# Patient Record
Sex: FEMALE | Race: WHITE | NOT HISPANIC OR LATINO | Employment: UNEMPLOYED | URBAN - METROPOLITAN AREA
[De-identification: names, ages, dates, MRNs, and addresses within clinical notes are randomized per-mention and may not be internally consistent; named-entity substitution may affect disease eponyms.]

---

## 2018-03-28 ENCOUNTER — OFFICE VISIT (OUTPATIENT)
Dept: NEUROSURGERY | Facility: CLINIC | Age: 58
End: 2018-03-28

## 2018-03-28 VITALS
SYSTOLIC BLOOD PRESSURE: 142 MMHG | BODY MASS INDEX: 41.62 KG/M2 | WEIGHT: 259 LBS | HEIGHT: 66 IN | HEART RATE: 78 BPM | DIASTOLIC BLOOD PRESSURE: 73 MMHG

## 2018-03-28 DIAGNOSIS — M51.36 DDD (DEGENERATIVE DISC DISEASE), LUMBAR: ICD-10-CM

## 2018-03-28 DIAGNOSIS — M51.16 HERNIATION OF LUMBAR INTERVERTEBRAL DISC WITH RADICULOPATHY: Primary | ICD-10-CM

## 2018-03-28 PROBLEM — M51.369 DDD (DEGENERATIVE DISC DISEASE), LUMBAR: Status: ACTIVE | Noted: 2018-03-28

## 2018-03-28 PROCEDURE — 99213 OFFICE O/P EST LOW 20 MIN: CPT | Performed by: NEUROLOGICAL SURGERY

## 2018-03-28 NOTE — PROGRESS NOTES
Subjective   Patient ID: Damaris Williamson is a 57 y.o. female is being seen for consultation today at the request of Parvin Gustafson for back pain.    Today the patient reports back pain that has worsened over the past two years. She states that the back pain radiates into the right leg down past the knee along with numbness in the right thigh. She also reports that she has had a hard time controlling her bladder and bowels for the past 6 months.    Back Pain   This is a chronic problem. The current episode started more than 1 year ago. The problem occurs daily. The problem has been gradually worsening since onset. The pain is present in the lumbar spine. The pain radiates to the right thigh and right knee. Associated symptoms include bladder incontinence, bowel incontinence, leg pain, numbness, tingling and weakness.       The following portions of the patient's history were reviewed and updated as appropriate: allergies, current medications, past family history, past medical history, past social history, past surgical history and problem list.    Review of Systems   Gastrointestinal: Positive for bowel incontinence.   Genitourinary: Positive for bladder incontinence.   Musculoskeletal: Positive for back pain.   Neurological: Positive for tingling, weakness and numbness.   All other systems reviewed and are negative.    I have not seen this patient for over 2 years.  Since I last saw, she lost her insurance and did not come back in for follow up.  She recently fell and injured her right knee.  She has seen Orthopedics and may need a knee replacement at some point.  She is wearing a brace and is hobbling around and cannot bear weight largely because of the knee itself, but she is also having increase in her back pain with radiating pain down the right buttock and into the calf.  This is a new symptom or at least a worsening of an ongoing symptom.  I think we need to reimage her and formulate a plan based on  that.  We will go ahead and get a new MRI and have her come back.              Objective   Physical Exam   Constitutional: She is oriented to person, place, and time. She appears well-developed and well-nourished.   HENT:   Head: Normocephalic and atraumatic.   Eyes: Conjunctivae and EOM are normal. Pupils are equal, round, and reactive to light.   Fundoscopic exam:       The right eye shows no papilledema. The right eye shows venous pulsations.        The left eye shows no papilledema. The left eye shows venous pulsations.   Neck: Carotid bruit is not present.   Neurological: She is oriented to person, place, and time. She has a normal Finger-Nose-Finger Test and a normal Heel to Shin Test. Gait normal.   Reflex Scores:       Tricep reflexes are 2+ on the right side and 2+ on the left side.       Bicep reflexes are 2+ on the right side and 2+ on the left side.       Brachioradialis reflexes are 2+ on the right side and 2+ on the left side.       Patellar reflexes are 2+ on the right side and 2+ on the left side.       Achilles reflexes are 2+ on the right side and 2+ on the left side.  Psychiatric: Her speech is normal.     Neurologic Exam     Mental Status   Oriented to person, place, and time.   Registration of memory: Good recent and remote memory.   Attention: normal. Concentration: normal.   Speech: speech is normal   Level of consciousness: alert  Knowledge: consistent with education.     Cranial Nerves     CN II   Visual fields full to confrontation.   Visual acuity: normal    CN III, IV, VI   Pupils are equal, round, and reactive to light.  Extraocular motions are normal.     CN V   Facial sensation intact.   Right corneal reflex: normal  Left corneal reflex: normal    CN VII   Facial expression full, symmetric.   Right facial weakness: none  Left facial weakness: none    CN VIII   Hearing: intact    CN IX, X   Palate: symmetric    CN XI   Right sternocleidomastoid strength: normal  Left sternocleidomastoid  strength: normal    CN XII   Tongue: not atrophic  Tongue deviation: none    Motor Exam   Muscle bulk: normal  Right arm tone: normal  Left arm tone: normal  Right leg tone: normal  Left leg tone: normal    Strength   Strength 5/5 except as noted.     Sensory Exam   Light touch normal.     Gait, Coordination, and Reflexes     Gait  Gait: normal    Coordination   Finger to nose coordination: normal  Heel to shin coordination: normal    Reflexes   Right brachioradialis: 2+  Left brachioradialis: 2+  Right biceps: 2+  Left biceps: 2+  Right triceps: 2+  Left triceps: 2+  Right patellar: 2+  Left patellar: 2+  Right achilles: 2+  Left achilles: 2+  Right : 2+  Left : 2+      Assessment/Plan   Independent Review of Radiographic Studies:    I reviewed the MRI done on 9/26/14 which did show some disc protrusion at L3-L4, L4-L5, and L5-S1.  It seemed to be more towards the left at L4-L5.  Agree with the report.      Medical Decision Making:    Before we do anything, I think we need to get a new MRI.  We will obtain that and have her come back for follow-up possibly considering physical therapy versus blocks.      Damaris was seen today for back pain.    Diagnoses and all orders for this visit:    Herniation of lumbar intervertebral disc with radiculopathy  -     MRI Lumbar Spine Without Contrast; Future    DDD (degenerative disc disease), lumbar  -     MRI Lumbar Spine Without Contrast; Future      Return in about 2 weeks (around 4/11/2018) for After MRI.

## 2018-04-04 ENCOUNTER — OFFICE VISIT (OUTPATIENT)
Dept: NEUROSURGERY | Facility: CLINIC | Age: 58
End: 2018-04-04

## 2018-04-04 VITALS
HEIGHT: 66 IN | BODY MASS INDEX: 43.39 KG/M2 | WEIGHT: 270 LBS | HEART RATE: 70 BPM | SYSTOLIC BLOOD PRESSURE: 102 MMHG | DIASTOLIC BLOOD PRESSURE: 64 MMHG

## 2018-04-04 DIAGNOSIS — M51.36 DDD (DEGENERATIVE DISC DISEASE), LUMBAR: Primary | ICD-10-CM

## 2018-04-04 DIAGNOSIS — M54.41 CHRONIC MIDLINE LOW BACK PAIN WITH RIGHT-SIDED SCIATICA: ICD-10-CM

## 2018-04-04 DIAGNOSIS — G89.29 CHRONIC MIDLINE LOW BACK PAIN WITH RIGHT-SIDED SCIATICA: ICD-10-CM

## 2018-04-04 PROCEDURE — 99213 OFFICE O/P EST LOW 20 MIN: CPT | Performed by: NEUROLOGICAL SURGERY

## 2018-04-04 NOTE — PROGRESS NOTES
"Subjective   Patient ID: Damaris Williamson is a 57 y.o. female is here today for follow-up after lumbar MRI.  She states her symptoms are unchanged since last visit. She has noticed her right leg \"dragging\" more since last visit.  She takes Naproxen 250 mg prn , Ibuprofen 200 mg prn, Tramadol 50 mg prn and Cyclobenzaprine 5 mg prn for pain.     Back Pain   This is a chronic problem. The current episode started more than 1 year ago. The problem occurs daily. The problem has been gradually worsening since onset. The pain is present in the lumbar spine. The pain radiates to the right thigh and right knee. Associated symptoms include bladder incontinence, bowel incontinence, leg pain, numbness, tingling and weakness.   Leg Pain    Associated symptoms include numbness and tingling.       The following portions of the patient's history were reviewed and updated as appropriate: allergies, current medications, past family history, past medical history, past social history, past surgical history and problem list.    Review of Systems   Gastrointestinal: Positive for bowel incontinence.   Genitourinary: Positive for bladder incontinence.   Musculoskeletal: Positive for back pain.   Neurological: Positive for tingling, weakness and numbness.   All other systems reviewed and are negative.    I had seen this patient intermittently over the last few years. We got a new MRI to investigate the sources of her low back, right buttock, and leg pain.  She has severe bilateral knee problems and is probably heading towards a right total knee replacement. She is working with her orthopedic surgeon with that.  The MRI was reviewed.  There is no evidence of any significant mechanical root compression.  Obviously, nothing from a surgical standpoint to offer. She had worked with Dr. Goodwin in the past and I think continuing to work with the pain management physician is the best option.  She is willing to go see a pain doctor within the [facility] " system at [facility] and we will arrange that.  She will be seen on an as needed basis.         Objective   Physical Exam   Constitutional: She is oriented to person, place, and time. She appears well-developed and well-nourished.   HENT:   Head: Normocephalic and atraumatic.   Eyes: Conjunctivae and EOM are normal. Pupils are equal, round, and reactive to light.   Fundoscopic exam:       The right eye shows no papilledema. The right eye shows venous pulsations.        The left eye shows no papilledema. The left eye shows venous pulsations.   Neck: Carotid bruit is not present.   Neurological: She is oriented to person, place, and time. She has a normal Finger-Nose-Finger Test and a normal Heel to Shin Test. Gait normal.   Reflex Scores:       Tricep reflexes are 2+ on the right side and 2+ on the left side.       Bicep reflexes are 2+ on the right side and 2+ on the left side.       Brachioradialis reflexes are 2+ on the right side and 2+ on the left side.       Patellar reflexes are 2+ on the right side and 2+ on the left side.       Achilles reflexes are 2+ on the right side and 2+ on the left side.  Psychiatric: Her speech is normal.     Neurologic Exam     Mental Status   Oriented to person, place, and time.   Registration of memory: Good recent and remote memory.   Attention: normal. Concentration: normal.   Speech: speech is normal   Level of consciousness: alert  Knowledge: consistent with education.     Cranial Nerves     CN II   Visual fields full to confrontation.   Visual acuity: normal    CN III, IV, VI   Pupils are equal, round, and reactive to light.  Extraocular motions are normal.     CN V   Facial sensation intact.   Right corneal reflex: normal  Left corneal reflex: normal    CN VII   Facial expression full, symmetric.   Right facial weakness: none  Left facial weakness: none    CN VIII   Hearing: intact    CN IX, X   Palate: symmetric    CN XI   Right sternocleidomastoid strength: normal  Left  sternocleidomastoid strength: normal    CN XII   Tongue: not atrophic  Tongue deviation: none    Motor Exam   Muscle bulk: normal  Right arm tone: normal  Left arm tone: normal  Right leg tone: normal  Left leg tone: normal    Strength   Strength 5/5 except as noted.     Sensory Exam   Light touch normal.     Gait, Coordination, and Reflexes     Gait  Gait: normal    Coordination   Finger to nose coordination: normal  Heel to shin coordination: normal    Reflexes   Right brachioradialis: 2+  Left brachioradialis: 2+  Right biceps: 2+  Left biceps: 2+  Right triceps: 2+  Left triceps: 2+  Right patellar: 2+  Left patellar: 2+  Right achilles: 2+  Left achilles: 2+  Right : 2+  Left : 2+      Assessment/Plan   Independent Review of Radiographic Studies:    I reviewed the MRI done 4/2/18 which showed some mild disc desiccation and mild disc bulging but certainly no root compression or herniated disc.  Agree with the report.    Medical Decision Making:    Certainly nothing from a surgical standpoint.  She is best off working with a pain physician and we will make the referral.  She will also continue working with her orthopedic surgeon.  We will see her on an as-needed basis.      Damaris was seen today for back pain and leg pain.    Diagnoses and all orders for this visit:    DDD (degenerative disc disease), lumbar  -     Ambulatory Referral to Pain Management    Chronic midline low back pain with right-sided sciatica  -     Ambulatory Referral to Pain Management      Return if symptoms worsen or fail to improve.

## 2018-04-17 ENCOUNTER — OFFICE VISIT (OUTPATIENT)
Dept: PAIN MEDICINE | Facility: CLINIC | Age: 58
End: 2018-04-17

## 2018-04-17 VITALS
OXYGEN SATURATION: 98 % | RESPIRATION RATE: 16 BRPM | HEIGHT: 66 IN | BODY MASS INDEX: 43.62 KG/M2 | WEIGHT: 271.4 LBS | SYSTOLIC BLOOD PRESSURE: 154 MMHG | HEART RATE: 85 BPM | DIASTOLIC BLOOD PRESSURE: 86 MMHG | TEMPERATURE: 98.5 F

## 2018-04-17 DIAGNOSIS — M54.41 CHRONIC MIDLINE LOW BACK PAIN WITH RIGHT-SIDED SCIATICA: Primary | ICD-10-CM

## 2018-04-17 DIAGNOSIS — G89.29 CHRONIC MIDLINE LOW BACK PAIN WITH RIGHT-SIDED SCIATICA: Primary | ICD-10-CM

## 2018-04-17 DIAGNOSIS — M43.06 LUMBAR SPONDYLOLYSIS: ICD-10-CM

## 2018-04-17 LAB
POC AMPHETAMINES: NEGATIVE
POC BARBITURATES: NEGATIVE
POC BENZODIAZEPHINES: NEGATIVE
POC COCAINE: NEGATIVE
POC METHADONE: NEGATIVE
POC METHAMPHETAMINE SCREEN URINE: NEGATIVE
POC OPIATES: NEGATIVE
POC OXYCODONE: NEGATIVE
POC PHENCYCLIDINE: NEGATIVE
POC PROPOXYPHENE: NEGATIVE
POC THC: NEGATIVE
POC TRICYCLIC ANTIDEPRESSANTS: NEGATIVE

## 2018-04-17 PROCEDURE — 80305 DRUG TEST PRSMV DIR OPT OBS: CPT | Performed by: PAIN MEDICINE

## 2018-04-17 PROCEDURE — 99204 OFFICE O/P NEW MOD 45 MIN: CPT | Performed by: PAIN MEDICINE

## 2018-04-17 RX ORDER — CYCLOBENZAPRINE HCL 5 MG
5 TABLET ORAL 2 TIMES DAILY PRN
Qty: 60 TABLET | Refills: 2 | Status: SHIPPED | OUTPATIENT
Start: 2018-04-17 | End: 2018-10-03 | Stop reason: SDUPTHER

## 2018-04-17 RX ORDER — TRAMADOL HYDROCHLORIDE 50 MG/1
50 TABLET ORAL DAILY PRN
Qty: 30 TABLET | Refills: 0 | Status: SHIPPED | OUTPATIENT
Start: 2018-04-17 | End: 2018-08-06 | Stop reason: SDUPTHER
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
## 2018-04-22 ENCOUNTER — RESULTS ENCOUNTER (OUTPATIENT)
Dept: PAIN MEDICINE | Facility: CLINIC | Age: 58
End: 2018-04-22

## 2018-04-22 DIAGNOSIS — M43.06 LUMBAR SPONDYLOLYSIS: ICD-10-CM

## 2018-04-22 DIAGNOSIS — M54.41 CHRONIC MIDLINE LOW BACK PAIN WITH RIGHT-SIDED SCIATICA: ICD-10-CM

## 2018-04-22 DIAGNOSIS — G89.29 CHRONIC MIDLINE LOW BACK PAIN WITH RIGHT-SIDED SCIATICA: ICD-10-CM

## 2018-05-02 ENCOUNTER — DOCUMENTATION (OUTPATIENT)
Dept: PAIN MEDICINE | Facility: CLINIC | Age: 58
End: 2018-05-02

## 2018-05-02 ENCOUNTER — OUTSIDE FACILITY SERVICE (OUTPATIENT)
Dept: PAIN MEDICINE | Facility: CLINIC | Age: 58
End: 2018-05-02

## 2018-05-02 PROCEDURE — 64493 INJ PARAVERT F JNT L/S 1 LEV: CPT | Performed by: PAIN MEDICINE

## 2018-05-02 PROCEDURE — 64495 INJ PARAVERT F JNT L/S 3 LEV: CPT | Performed by: PAIN MEDICINE

## 2018-05-02 PROCEDURE — 64494 INJ PARAVERT F JNT L/S 2 LEV: CPT | Performed by: PAIN MEDICINE

## 2018-05-02 NOTE — PROGRESS NOTES
Bilateral L2-5 Lumbar Medial Branch Blockade  [facility]    PREOPERATIVE DIAGNOSIS:  Lumbar spondylosis without myelopathy    POSTOPERATIVE DIAGNOSIS:  Lumbar spondylosis without myelopathy    PROCEDURE:   Diagnostic Bilateral Lumbar Medial Branch Nerve Blockades, with fluoroscopy:  L2, L3, L4, and L5 nerves (at the L3, L4, L5 transverse processes and the sacral alar groove) to block facet joints L3-4, L4-5, and L5-S1  1. 55746-55 -- Bilateral Lumbar Facet blocks, 1st Level  2. 17206-62 -- Bilateral Lumbar Facet blocks, 2nd  Level  3. 66997-32 -- Bilateral Lumbar Facet blocks, 3rd Level    PRE-PROCEDURE DISCUSSION WITH PATIENT:    Risks and complications were discussed with the patient prior to starting the procedure and informed consent was obtained.      SURGEON:  Dora Tellez MD    REASON FOR PROCEDURE:    Diagnostic injection at this level is needed    SEDATION:  Versed 2mg IV  ANESTHETIC:  Marcaine 0.25%  STEROID:  Methylprednisolone (DEPO MEDROL) 80mg/ml  TOTAL VOLUME OF SOLUTION: 8ml    DESCRIPTON OF PROCEDURE:  After obtaining informed consent, IV access was obtained in the preoperative area.   The patient was taken to the operating room.  The patient was placed in the prone position with a pillow under the abdomen. All pressure points were well padded.  EKG, blood pressure, and pulse oximeter were monitored.  The patient was monitored and sedated by the RN under my direction. The lumbosacral area was prepped with Chloraprep and draped in a sterile fashion. Under fluoroscopic guidance the transverse processes of the L3, L4, and L5 vertebrae at the junctions of the superior articular processes were identified on the right. Also identified was the groove between the ala and the superior articular process of the sacrum on the ipsilateral side.  Skin and subcutaneous tissue were anesthetized with 1% lidocaine above each of these points. A 22-gauge spinal needle was introduced under  fluoroscopic guidance at the above junctions. Aspiration was negative for blood and CSF.  After confirming the position of the needle with fluoroscope in all views, 1 mL of the anesthetic solution noted above was injected at each of these points.  Needles were removed intact from each of the areas.  A similar procedure was repeated to block the L2, L3, L4, and L5 nerves on the contralateral side.   Onset of analgesia was noted.  Vital signs remained stable throughout.      ESTIMATED BLOOD LOSS:  <5 mL  SPECIMENS:  none    COMPLICATIONS:   No complications were noted.    TOLERANCE & DISCHARGE CONDITION:    The patient tolerated the procedure well.  The patient was transported to the recovery area without difficulties.  The patient was discharged to home under the care of family in stable and satisfactory condition.    PLAN OF CARE:  1. The patient was given our standard instruction sheet.  2. We discussed that Lumbar Medial Branch Blockade is a diagnostic procedure in consideration for radiofrequency ablation if two diagnostic procedures prove to be positive for significant benefit.  If sustained relief of 6 to eight weeks is obtained, then an alternative plan could be therapeutic lumbar branch blockades.  3. The patient is asked to keep a pain log each hour for 8 hours after the procedure today.  4. The patient will  Repeat injection 4 wks.  5. The patient will resume all medications as per the medication reconciliation sheet.

## 2018-06-18 ENCOUNTER — OFFICE VISIT (OUTPATIENT)
Dept: PAIN MEDICINE | Facility: CLINIC | Age: 58
End: 2018-06-18

## 2018-06-18 VITALS
RESPIRATION RATE: 18 BRPM | BODY MASS INDEX: 43.39 KG/M2 | DIASTOLIC BLOOD PRESSURE: 70 MMHG | WEIGHT: 270 LBS | HEIGHT: 66 IN | HEART RATE: 96 BPM | SYSTOLIC BLOOD PRESSURE: 140 MMHG | OXYGEN SATURATION: 97 % | TEMPERATURE: 97.3 F

## 2018-06-18 DIAGNOSIS — M51.36 DDD (DEGENERATIVE DISC DISEASE), LUMBAR: Primary | ICD-10-CM

## 2018-06-18 DIAGNOSIS — G89.29 CHRONIC MIDLINE LOW BACK PAIN WITH RIGHT-SIDED SCIATICA: ICD-10-CM

## 2018-06-18 DIAGNOSIS — M51.16 HERNIATION OF LUMBAR INTERVERTEBRAL DISC WITH RADICULOPATHY: ICD-10-CM

## 2018-06-18 DIAGNOSIS — M43.06 LUMBAR SPONDYLOLYSIS: ICD-10-CM

## 2018-06-18 DIAGNOSIS — M54.41 CHRONIC MIDLINE LOW BACK PAIN WITH RIGHT-SIDED SCIATICA: ICD-10-CM

## 2018-06-18 PROCEDURE — 99214 OFFICE O/P EST MOD 30 MIN: CPT | Performed by: NURSE PRACTITIONER

## 2018-06-18 RX ORDER — IBUPROFEN 200 MG
400 TABLET ORAL EVERY 12 HOURS PRN
Qty: 60 TABLET | Refills: 2 | Status: SHIPPED | OUTPATIENT
Start: 2018-06-18 | End: 2018-12-30 | Stop reason: SDUPTHER

## 2018-06-18 NOTE — PROGRESS NOTES
CHIEF COMPLAINT  Back pain has decreased since last visit. States she received 50% relief from her injection for a couple weeks.  Initial Evaluation by Nathaly ZAZUETA.  Subjective   Damaris Williamson is a 57 y.o. female  who presents to the office for follow-up of procedure.  She completed a Bilateral L2-5 Lumbar Medial Branch Blockade   on  5/2/18 performed by Dr. Tellez for management of low back pain. Patient reports 50% relief from the procedure for 2-3 weeks.    Complains of pain in her low back. Today her pain is 8/10VAS. Describes the pain as continuous and variable. Continues with Tramadol 50 mg 2-4/week, Flexeril 10 mg 4-5/week. and Ibuprofen 400 mg BID PRN. Denies any side effects from this, except somnolence. She only takes this at night. Does not take during day-time. It does help her decrease her pain moderately. Asked for prescription for Ibuprofen. ADL's by self.    Back Pain   This is a chronic problem. The current episode started more than 1 year ago. The problem occurs constantly. The problem has been gradually worsening (temporarily improved with lumbar MBB) since onset. The pain is present in the lumbar spine and sacro-iliac. The quality of the pain is described as aching and shooting. The pain radiates to the left thigh. The pain is at a severity of 8/10. The pain is the same all the time. The symptoms are aggravated by bending, position, sitting, standing and twisting. Stiffness is present in the morning. Associated symptoms include bladder incontinence (unchanged), bowel incontinence (unchanged), a fever, headaches, leg pain, numbness, paresis, paresthesias, pelvic pain, tingling and weakness. Pertinent negatives include no abdominal pain, chest pain, dysuria, perianal numbness or weight loss. Risk factors include obesity, menopause, lack of exercise, poor posture and sedentary lifestyle.        Past pain medications: others temporarily     Current pain medications:   Flexeril 5 mg prn -  sparingly  Ibuprofen/Naproxen- alternates  Tramadol - takes very sparingly     Past therapies:  Physical Therapy: yes  Chiropractor: yes- pt states she had a family member who was a chiropractor and he would adjust her for free but he has since passed away.   Massage Therapy: no  TENS: yes  Neck or back surgery: no  Past pain management: yes     Neurosurgery evaluation: Dr. Merrill 4-2018-- no surgery    Previous Injections: 5-8-66--bilateral L2-L5 MBB  Effect of Injection (%): 50%  Length of Relief: 2-3 weeks     Previous Injections: yes at previous pain management, last one maybe [id number]  Effect of Injection (%): 50%  Length of Relief: couple of months     PEG Assessment   What number best describes your pain on average in the past week?8  What number best describes how, during the past week, pain has interfered with your enjoyment of life?9  What number best describes how, during the past week, pain has interfered with your general activity?  9      The following portions of the patient's history were reviewed and updated as appropriate: allergies, current medications, past family history, past medical history, past social history, past surgical history and problem list.    Review of Systems   Constitutional: Positive for fever. Negative for chills and weight loss.   Respiratory: Negative for shortness of breath.    Cardiovascular: Negative for chest pain.   Gastrointestinal: Positive for bowel incontinence (unchanged), diarrhea and nausea. Negative for abdominal pain, constipation and vomiting.   Genitourinary: Positive for bladder incontinence (unchanged), difficulty urinating, dyspareunia and pelvic pain. Negative for dysuria.   Musculoskeletal: Positive for back pain.   Neurological: Positive for dizziness, tingling, weakness, numbness, headaches and paresthesias. Negative for light-headedness.   Psychiatric/Behavioral: Positive for sleep disturbance. Negative for confusion, hallucinations, self-injury  "and suicidal ideas. The patient is not nervous/anxious.        Vitals:    06/18/18 1458   BP: 140/70   Pulse: 96   Resp: 18   Temp: 97.3 °F (36.3 °C)   SpO2: 97%   Weight: 122 kg (270 lb)   Height: 167.6 cm (65.98\")   PainSc:   8   PainLoc: Back     Objective   Physical Exam   Constitutional: She is oriented to person, place, and time. Vital signs are normal. She appears well-developed and well-nourished. She is cooperative.   HENT:   Head: Normocephalic and atraumatic.   Nose: Nose normal.   Eyes: Conjunctivae and lids are normal.   Cardiovascular: Normal rate.    Pulmonary/Chest: Effort normal.   Abdominal:   obese   Musculoskeletal:        Lumbar back: She exhibits tenderness and bony tenderness (bilateral L2-L5 facets--moderate tenderness-- + loading manuever).   Negative SLR bilaterally  Braces of bilateral knees   Neurological: She is alert and oriented to person, place, and time. Gait normal.   Reflex Scores:       Patellar reflexes are 1+ on the right side and 1+ on the left side.  Skin: Skin is warm, dry and intact.   Psychiatric: She has a normal mood and affect. Her speech is normal and behavior is normal. Judgment and thought content normal. Cognition and memory are normal.   Nursing note and vitals reviewed.      Assessment/Plan   Damaris was seen today for back pain.    Diagnoses and all orders for this visit:    DDD (degenerative disc disease), lumbar  -     Ambulatory Referral to Physical Therapy Evaluate and treat    Lumbar spondylolysis  -     Ambulatory Referral to Physical Therapy Evaluate and treat  -     Case Request    Herniation of lumbar intervertebral disc with radiculopathy    Chronic midline low back pain with right-sided sciatica  -     Ambulatory Referral to Physical Therapy Evaluate and treat    Other orders  -     ibuprofen (ADVIL,MOTRIN) 200 MG tablet; Take 2 tablets by mouth Every 12 (Twelve) Hours As Needed for Mild Pain .      --- The urine drug screen confirmation from 4-17-18 has " been reviewed and the result is APPROPRIATE (NEGATIVE- no recent prescription for pain medication) based on patient history and ANT report  --- Repeat bilateral L2-L5 MBB. No blood thinners. Reviewed the procedure at length with the patient.  Included in the review was expectations, complications, risk and benefits.The procedure was described in detail and the risks, benefits and alternatives were discussed with the patient (including but not limited to: bleeding, infection, nerve damage, worsening of pain, inability to perform injection, paralysis, seizures, and death) who agreed to proceed.  Discussed the potential for sedation if warranted/wanted.  Questions were answered and in a way the patient could understand.  Patient verbalized understanding and wishes to proceed.  This intervention will be ordered.  --- Physical Therapy. Patient requests [facility] in [place].  --- Back brace. Ordering an  LSO back brace with anterior, lateral and posterior support from T-9 to S-1 to reduce pain by restricting mobility of the trunk.  --- Ibuprofen 400 mg q12 PRN. Discussed medication with the patient.  Included in this discussion was the potential for side effects and adverse events.  Patient verbalized understanding and wished to proceed.  Prescription will be sent to pharmacy.  --- Follow-up after procedure or sooner if needed.    -------  Education about Medial Branch Blockade and RF Therapy:    This medial branch blockade (MBB) suggested is intended for diagnostic purposes, with the intent of offering the patient Radiofrequency thermal rhizotomy (RF) if the MBB is diagnostically effective.  The diagnostic blockade is necessary to determine the likelihood that RF therapy could be efficacious in providing long term relief to the patient.    Medial branches are sensory nerve branches that connect to a facet joint and transmit sensations & pain signals from that joint.  Facet is a term for the type of joints found  "in the spine.  Medial branches are the nerves that go to a facet, and therefore are also sometimes called \"facet joint nerves\" (FJNs).      In a medial branch blockade procedure, xray fluoroscopy is used to verify the locations of the outside of the joint lines which are being targeted.  Under xray guidance, needles are placed to these areas.  Contrast dye is injected to confirm proper placement, with dye flowing over the joint area, and to ensure that the dye does not flow into unintended areas such as a vein.  When this is confirmed, local anesthetic is injected to block the medial branch at that joint level.      If MBBs are diagnostically successful in blocking pain, then the patient is most likely a great candidate for Radiofrequency of those facet joint nerves.  In the RF procedure, needles are placed to the joint lines in the same fashion, and after testing, the needle tips are heated to thermally treat the nerves, blocking the nerves by in essence damaging the nerves with the heat treatment.       Medically, a successful RF procedure should provide a patient with 50% pain relief or more for at least 6 months.  Clinical experience suggests that successful patients receive relief more in the range of 12 months on average.  We also discussed that a fortunate minority of patients receive therapeutic success from the MBB, and may not require RF ablation.  If a patient receives more than 8 weeks of relief from MBB, then occasional repeat MBB for therapeutic purposes is a very reasonable alternative therapy.  This course of therapy is consistent with our LCDs according to our CMS  in the area, and therefore other insurance providers should follow accordingly.  We will monitor our patients to screen for these therapeutic responders and will offer RF therapy only when necessary.        We discussed that MBB & RF are not without risks.  Guidelines regarding anticoagulant use & neuraxial procedures will be " respected.  Patients that are ill or otherwise may be at risk for sepsis will not have their spines accessed by neuraxial injections of any type.  This patient will not be offered these therapies if there is an increased risk.   We discussed that there is a risk of postprocedural pain and also a risk of worsening of clinical picture with these procedures as with any neuraxial procedure.    -------     ANT REPORT    As part of the patient's treatment plan, I am prescribing controlled substances. The patient has been made aware of appropriate use of such medications, including potential risk of somnolence, limited ability to drive and/or work safely, and the potential for dependence or overdose. It has also bee made clear that these medications are for use by this patient only, without concomitant use of alcohol or other substances unless prescribed.     Patient has completed prescribing agreement detailing terms of continued prescribing of controlled substances, including monitoring ANT reports, urine drug screening, and pill counts if necessary. The patient is aware that inappropriate use will results in cessation of prescribing such medications.    ANT report has been reviewed and scanned into the patient's chart.    As the clinician, I personally reviewed the ANT from 6-15-18 while the patient was in the office today.    History and physical exam exhibit continued safe and appropriate use of controlled substances.       EMR Dragon/Transcription disclaimer:   Much of this encounter note is an electronic transcription/translation of spoken language to printed text. The electronic translation of spoken language may permit erroneous, or at times, nonsensical words or phrases to be inadvertently transcribed; Although I have reviewed the note for such errors, some may still exist.

## 2018-08-06 ENCOUNTER — OFFICE VISIT (OUTPATIENT)
Dept: PAIN MEDICINE | Facility: CLINIC | Age: 58
End: 2018-08-06

## 2018-08-06 VITALS
TEMPERATURE: 95.8 F | HEIGHT: 66 IN | SYSTOLIC BLOOD PRESSURE: 123 MMHG | BODY MASS INDEX: 42.4 KG/M2 | DIASTOLIC BLOOD PRESSURE: 68 MMHG | OXYGEN SATURATION: 100 % | HEART RATE: 96 BPM | RESPIRATION RATE: 15 BRPM | WEIGHT: 263.8 LBS

## 2018-08-06 DIAGNOSIS — G89.29 OTHER CHRONIC PAIN: Primary | ICD-10-CM

## 2018-08-06 DIAGNOSIS — M51.36 DDD (DEGENERATIVE DISC DISEASE), LUMBAR: ICD-10-CM

## 2018-08-06 DIAGNOSIS — M43.06 LUMBAR SPONDYLOLYSIS: ICD-10-CM

## 2018-08-06 PROCEDURE — 99214 OFFICE O/P EST MOD 30 MIN: CPT | Performed by: NURSE PRACTITIONER

## 2018-08-06 RX ORDER — LOSARTAN POTASSIUM AND HYDROCHLOROTHIAZIDE 25; 100 MG/1; MG/1
TABLET ORAL DAILY
Refills: 0 | COMMUNITY
Start: 2018-07-24 | End: 2018-09-06

## 2018-08-06 RX ORDER — TRAMADOL HYDROCHLORIDE 50 MG/1
50 TABLET ORAL DAILY PRN
Qty: 30 TABLET | Refills: 0 | Status: SHIPPED | OUTPATIENT
Start: 2018-08-06 | End: 2018-09-06 | Stop reason: SDUPTHER

## 2018-08-06 NOTE — PROGRESS NOTES
"CHIEF COMPLAINT  F/U back pain. Pt states she wants to discuss Ibuprofen and if it could be causing BP to go up. Her pharmacist mentioned this to her. States back pain is unchanged since last visit and knee pain as well. She got a gel shot with some steroid into right knee last Friday and a steroid shot in left knee as well (by ortho). Wearing right leg brace. She needs refill on Tramadol. She is going to see a neurologist for her neuropathy this week and is going to have an updated EMG.     Subjective   Damaris Williamson is a 58 y.o. female  who presents to the office for follow-up.She has a history of chronic back pain. Reports her pain is unchanged since last office visit.    Last in office on 6/18/18.  She previously completed a bilateral L2-L5 lumbar medial branch block by Dr. Tellez on 5-2-18.  The plan was to repeat the bilateral L2-L5 medial branch block.  This was ordered.  She was also referred to physical therapy and patient requested [facility] in [place].  She was also ordered a back brace.  Also prescribed ibuprofen 400 mg every 12 hours when necessary.  She was previously prescribed tramadol 50 mg quantity of 30 from Dr. Tellez.  Last refilled on 4/18/18.    Complains of pain in her low back. Today her pain is 7/10VAS. Reports her pain is unchanged since last office visit. However, her BP started going up with Ibuprofen  But she also admits her PCP was changed BP medication at same time.  Continues with Tramadol 50 mg 1/day, Ibuprofen 400 mg PRN, and Flexeril 5 mg 1/day. Denies any side effects from the regimen. The regimen helps decrease her pain by 50-60%. Pain worsens as day progresses. ADL's by self.     She later admits that she is also taking Naproxen.     Had knee injections 8-3-18. \"It's starting to kick in.\"     Back Pain   This is a chronic problem. The current episode started more than 1 year ago. The problem occurs constantly. The problem has been gradually worsening (temporarily improved " Detail Level: Generalized with lumbar MBB; unchanged from last office visit) since onset. The pain is present in the lumbar spine and sacro-iliac. The quality of the pain is described as aching and shooting. The pain radiates to the left thigh. The pain is at a severity of 7/10. The pain is the same all the time. The symptoms are aggravated by bending, position, sitting, standing and twisting. Stiffness is present in the morning. Associated symptoms include bladder incontinence (unchanged), bowel incontinence (unchanged), headaches, leg pain, numbness, paresis, paresthesias, tingling and weakness. Pertinent negatives include no abdominal pain, chest pain (pt states over the last couple of weeks she has but not currently today.), dysuria, fever, pelvic pain, perianal numbness or weight loss. Risk factors include obesity, menopause, lack of exercise, poor posture and sedentary lifestyle.      PEG Assessment   What number best describes your pain on average in the past week?8  What number best describes how, during the past week, pain has interfered with your enjoyment of life?7  What number best describes how, during the past week, pain has interfered with your general activity?  8    The following portions of the patient's history were reviewed and updated as appropriate: allergies, current medications, past family history, past medical history, past social history, past surgical history and problem list.    Review of Systems   Constitutional: Negative for chills, fever and weight loss.   Respiratory: Negative for shortness of breath.    Cardiovascular: Negative for chest pain (pt states over the last couple of weeks she has but not currently today.).   Gastrointestinal: Positive for bowel incontinence (unchanged) and nausea (occ). Negative for abdominal pain, constipation, diarrhea and vomiting.   Genitourinary: Positive for bladder incontinence (unchanged). Negative for difficulty urinating, dyspareunia, dysuria and pelvic pain.  "  Musculoskeletal: Positive for back pain.   Neurological: Positive for dizziness, tingling, weakness, numbness, headaches and paresthesias. Negative for light-headedness.   Psychiatric/Behavioral: Positive for sleep disturbance. Negative for confusion, hallucinations, self-injury and suicidal ideas. The patient is not nervous/anxious.        Vitals:    08/06/18 1001   BP: 123/68   Pulse: 96   Resp: 15   Temp: 95.8 °F (35.4 °C)   SpO2: 100%   Weight: 120 kg (263 lb 12.8 oz)   Height: 167.6 cm (65.98\")   PainSc:   7   PainLoc: Back     Objective   Physical Exam   Constitutional: She is oriented to person, place, and time. Vital signs are normal. She appears well-developed and well-nourished. She is cooperative.   HENT:   Head: Normocephalic and atraumatic.   Nose: Nose normal.   Eyes: Conjunctivae and lids are normal.   Cardiovascular: Normal rate.    Pulmonary/Chest: Effort normal.   Abdominal:   obese   Musculoskeletal:        Lumbar back: She exhibits tenderness and bony tenderness (bilateral L2-L5 facets--moderate tenderness-- + loading manuever).   Brace of right knees   Neurological: She is alert and oriented to person, place, and time. Gait abnormal.   Reflex Scores:       Patellar reflexes are 1+ on the left side.  Skin: Skin is warm, dry and intact.   Psychiatric: She has a normal mood and affect. Her speech is normal and behavior is normal. Judgment and thought content normal. Cognition and memory are normal.   Nursing note and vitals reviewed.      Assessment/Plan   Damaris was seen today for back pain.    Diagnoses and all orders for this visit:    Other chronic pain    DDD (degenerative disc disease), lumbar    Lumbar spondylolysis    Other orders  -     traMADol (ULTRAM) 50 MG tablet; Take 1 tablet by mouth Daily As Needed for Moderate Pain .      --- The urine drug screen confirmation from 4-17-18 has been reviewed and the result is APPROPRIATE based on patient history and ANT report  --- Refill " Tramadol. Patient appears stable with current regimen. No adverse effects. Regarding continuation of opioids, there is no evidence of aberrant behavior or any red flags.  The patient continues with appropriate response to opioid therapy. ADL's remain intact by self.   --- Proceed with bilateral L2-L5 MBB. No blood thinners. Reviewed the procedure at length with the patient.  Included in the review was expectations, complications, risk and benefits.The procedure was described in detail and the risks, benefits and alternatives were discussed with the patient (including but not limited to: bleeding, infection, nerve damage, worsening of pain, inability to perform injection, paralysis, seizures, and death) who agreed to proceed.  Discussed the potential for sedation if warranted/wanted.  Questions were answered and in a way the patient could understand.  Patient verbalized understanding and wishes to proceed.  This intervention will be ordered.  --- Stop Naproxen.   --- Can take Ibuprofen sparingly. Patient is to keep notebook at home to monitor BP in terms of taking medication.  --- Continue with other specialists as scheduled.   --- Follow-up after procedure or sooner if needed.         ANT REPORT    As part of the patient's treatment plan, I am prescribing controlled substances. The patient has been made aware of appropriate use of such medications, including potential risk of somnolence, limited ability to drive and/or work safely, and the potential for dependence or overdose. It has also bee made clear that these medications are for use by this patient only, without concomitant use of alcohol or other substances unless prescribed.     Patient has completed prescribing agreement detailing terms of continued prescribing of controlled substances, including monitoring ANT reports, urine drug screening, and pill counts if necessary. The patient is aware that inappropriate use will results in cessation of  Size Of Lesion In Cm (Optional): 0 prescribing such medications.    ANT report has been reviewed and scanned into the patient's chart.    As the clinician, I personally reviewed the ANT from 8-3-18 while the patient was in the office today.    History and physical exam exhibit continued safe and appropriate use of controlled substances.      EMR Dragon/Transcription disclaimer:   Much of this encounter note is an electronic transcription/translation of spoken language to printed text. The electronic translation of spoken language may permit erroneous, or at times, nonsensical words or phrases to be inadvertently transcribed; Although I have reviewed the note for such errors, some may still exist.

## 2018-08-22 ENCOUNTER — DOCUMENTATION (OUTPATIENT)
Dept: PAIN MEDICINE | Facility: CLINIC | Age: 58
End: 2018-08-22

## 2018-08-22 ENCOUNTER — OUTSIDE FACILITY SERVICE (OUTPATIENT)
Dept: PAIN MEDICINE | Facility: CLINIC | Age: 58
End: 2018-08-22

## 2018-08-22 PROCEDURE — 64495 INJ PARAVERT F JNT L/S 3 LEV: CPT | Performed by: PAIN MEDICINE

## 2018-08-22 PROCEDURE — 64493 INJ PARAVERT F JNT L/S 1 LEV: CPT | Performed by: PAIN MEDICINE

## 2018-08-22 PROCEDURE — 64494 INJ PARAVERT F JNT L/S 2 LEV: CPT | Performed by: PAIN MEDICINE

## 2018-08-22 NOTE — PROGRESS NOTES
Bilateral L2-5 Lumbar Medial Branch Blockade  [facility]    PREOPERATIVE DIAGNOSIS:  Lumbar spondylosis without myelopathy    POSTOPERATIVE DIAGNOSIS:  Lumbar spondylosis without myelopathy    PROCEDURE:   Diagnostic Bilateral Lumbar Medial Branch Nerve Blockades, with fluoroscopy:  L2, L3, L4, and L5 nerves (at the L3, L4, L5 transverse processes and the sacral alar groove) to block facet joints L3-4, L4-5, and L5-S1  1. 93694-05 -- Bilateral Lumbar Facet blocks, 1st Level  2. 70929-44 -- Bilateral Lumbar Facet blocks, 2nd  Level  3. 49439-35 -- Bilateral Lumbar Facet blocks, 3rd Level    PRE-PROCEDURE DISCUSSION WITH PATIENT:    Risks and complications were discussed with the patient prior to starting the procedure and informed consent was obtained.      SURGEON:  Dora Tellez MD    REASON FOR PROCEDURE:   The patient complains of pain that seems to have a significant axial component, Increased back pain on range of motion exams and Pain on extension of the lumbar spine    SEDATION:  Versed 2mg IV  ANESTHETIC:  Marcaine 0.25%  STEROID:  Methylprednisolone (DEPO MEDROL) 80mg/ml  TOTAL VOLUME OF SOLUTION: 8ml    DESCRIPTON OF PROCEDURE:  After obtaining informed consent, IV access was obtained in the preoperative area.   The patient was taken to the operating room.  The patient was placed in the prone position with a pillow under the abdomen. All pressure points were well padded.  EKG, blood pressure, and pulse oximeter were monitored.  The patient was monitored and sedated by the RN under my direction. The lumbosacral area was prepped with Chloraprep and draped in a sterile fashion. Under fluoroscopic guidance the transverse processes of the L3, L4, and L5 vertebrae at the junctions of the superior articular processes were identified on the right. Also identified was the groove between the ala and the superior articular process of the sacrum on the ipsilateral side.  Skin and subcutaneous  tissue were anesthetized with 1% lidocaine above each of these points. A 22-gauge spinal needle was introduced under fluoroscopic guidance at the above junctions. Aspiration was negative for blood and CSF.  After confirming the position of the needle with fluoroscope in all views, 1 mL of the anesthetic solution noted above was injected at each of these points.  Needles were removed intact from each of the areas.  A similar procedure was repeated to block the L2, L3, L4, and L5 nerves on the contralateral side.   Onset of analgesia was noted.  Vital signs remained stable throughout.      ESTIMATED BLOOD LOSS:  <5 mL  SPECIMENS:  none    COMPLICATIONS:   No complications were noted.    TOLERANCE & DISCHARGE CONDITION:    The patient tolerated the procedure well.  The patient was transported to the recovery area without difficulties.  The patient was discharged to home under the care of family in stable and satisfactory condition.    PLAN OF CARE:  1. The patient was given our standard instruction sheet.  2. We discussed that Lumbar Medial Branch Blockade is a diagnostic procedure in consideration for radiofrequency ablation if two diagnostic procedures prove to be positive for significant benefit.  If sustained relief of 6 to eight weeks is obtained, then an alternative plan could be therapeutic lumbar branch blockades.  3. The patient is asked to keep a pain log each hour for 8 hours after the procedure today.  4. The patient will  Return to clinic 4 wks.  5. The patient will resume all medications as per the medication reconciliation sheet.

## 2018-09-06 ENCOUNTER — OFFICE VISIT (OUTPATIENT)
Dept: PAIN MEDICINE | Facility: CLINIC | Age: 58
End: 2018-09-06

## 2018-09-06 VITALS
SYSTOLIC BLOOD PRESSURE: 166 MMHG | OXYGEN SATURATION: 97 % | DIASTOLIC BLOOD PRESSURE: 87 MMHG | WEIGHT: 264.8 LBS | RESPIRATION RATE: 18 BRPM | TEMPERATURE: 97.9 F | HEART RATE: 83 BPM | HEIGHT: 66 IN | BODY MASS INDEX: 42.56 KG/M2

## 2018-09-06 DIAGNOSIS — M51.36 DDD (DEGENERATIVE DISC DISEASE), LUMBAR: ICD-10-CM

## 2018-09-06 DIAGNOSIS — M25.562 CHRONIC PAIN OF BOTH KNEES: ICD-10-CM

## 2018-09-06 DIAGNOSIS — M54.41 CHRONIC MIDLINE LOW BACK PAIN WITH RIGHT-SIDED SCIATICA: ICD-10-CM

## 2018-09-06 DIAGNOSIS — M43.06 LUMBAR SPONDYLOLYSIS: Primary | ICD-10-CM

## 2018-09-06 DIAGNOSIS — G89.29 CHRONIC PAIN OF BOTH KNEES: ICD-10-CM

## 2018-09-06 DIAGNOSIS — M25.561 CHRONIC PAIN OF BOTH KNEES: ICD-10-CM

## 2018-09-06 DIAGNOSIS — G89.29 CHRONIC MIDLINE LOW BACK PAIN WITH RIGHT-SIDED SCIATICA: ICD-10-CM

## 2018-09-06 PROCEDURE — 99214 OFFICE O/P EST MOD 30 MIN: CPT | Performed by: PAIN MEDICINE

## 2018-09-06 RX ORDER — DIAZEPAM 5 MG/1
TABLET ORAL
Refills: 0 | COMMUNITY
Start: 2018-08-15 | End: 2018-11-07

## 2018-09-06 RX ORDER — CHLORTHALIDONE 25 MG/1
25 TABLET ORAL DAILY
Refills: 0 | COMMUNITY
Start: 2018-08-09 | End: 2023-03-09

## 2018-09-06 RX ORDER — LOSARTAN POTASSIUM 100 MG/1
TABLET ORAL DAILY
Refills: 0 | Status: ON HOLD | COMMUNITY
Start: 2018-08-09 | End: 2021-08-19

## 2018-09-06 RX ORDER — TRAMADOL HYDROCHLORIDE 50 MG/1
50 TABLET ORAL DAILY PRN
Qty: 30 TABLET | Refills: 0 | Status: SHIPPED | OUTPATIENT
Start: 2018-09-06 | End: 2018-11-07 | Stop reason: SDUPTHER

## 2018-09-06 RX ORDER — CYANOCOBALAMIN 1000 UG/ML
INJECTION, SOLUTION INTRAMUSCULAR; SUBCUTANEOUS
Refills: 3 | COMMUNITY
Start: 2018-08-21 | End: 2021-03-23

## 2018-09-06 NOTE — PROGRESS NOTES
CHIEF COMPLAINT: Back Pain    HPI  Damaris Williamson is a 58 y.o. female.  She is here to follow up for Back Pain    Damaris Williamson is a 58 y.o. female  who presents to the office for follow-up.  She completed a Bilateral L2-5 Lumbar Medial Branch Blockade on 8/22/18. Patient reports 40% relief from the procedure for several weeks. Since last visit their pain has improved. This was her 2nd injection.   The patient states their pain is a 7 on a scale of 1-10.  The patient describes this pain as constant dull and ache.  The pain is located in bilateral low back and does not radiate. This painful problem is aggravated by physical activity, twisting, bending and standing and is alleviated by pain medication and relaxation. Has muscle spasms almost daily, especially with PT. Flexeril helping.     Worse pain today is left foot. Currently in boot. +fracture confirmed yesterday. Has to wear for 2 weeks. constant dull, ache throbbing pain. Worse with weight bearing.     Taking tramadol more due to foot pain. Every other day now. Will be needing refill soon.     Has bilateral knee pain. Unchanged. Receiving steroid injections prn from ortho. Needs TKA but has to loose weight.  Constant dull ache pain. Worse with weight bearing. Working on getting gel approved.     Past pain medications: states some strong pain medicines in the beginning     Current pain medications:   Flexeril 5 mg prn - sparingly  Ibuprofen/Naproxen- alternates  Tramadol - takes every other day.      Past therapies:  Physical Therapy: yes  Chiropractor: yes- pt states she had a family member who was a chiropractor and he would adjust her for free but he has since passed away.   Massage Therapy: no  TENS: yes  Neck or back surgery: no  Past pain management: yes    Previous Injection: Bilateral L2-5 Lumbar Medial Branch Blockade on 5/2/18  Effect of Injection (%): 50%  Length of Relief: 2-3 months    Previous Injection: Bilateral L2-5 Lumbar Medial Branch  Blockade on 8/22/18   Effect of Injection (%): 40% Bilateral L2-5 Lumbar Medial Branch Blockade     Length of Relief: ongoing     Previous Injections: yes at previous pain management, last one maybe [id number]  Effect of Injection (%): 50%  Length of Relief: couple of months       PEG Assessment   What number best describes your pain on average in the past week? 7  What number best describes how, during the past week, pain has interfered with your enjoyment of life? 7  What number best describes how, during the past week, pain has interfered with your general activity? 7      Current Outpatient Prescriptions:   •  ascorbic acid (HM VITAMIN C) 1000 MG tablet, Take 1,000 mg by mouth Daily., Disp: , Rfl:   •  aspirin 81 MG EC tablet, Take 81 mg by mouth Daily., Disp: , Rfl:   •  calcium carbonate-vitamin d (CALTRATE 600+D) 600-400 MG-UNIT per tablet, Take 1 tablet by mouth Daily., Disp: , Rfl:   •  chlorthalidone (HYGROTON) 25 MG tablet, Take 25 mg by mouth Daily., Disp: , Rfl: 0  •  cyanocobalamin 1000 MCG/ML injection, INJECT 1 ML ONCE A WEEK FOR 4 WEEKS AND THEN ONCE A MONTH FOR 5 MONTHS AS DIRECTED., Disp: , Rfl: 3  •  cyclobenzaprine (FLEXERIL) 5 MG tablet, Take 1 tablet by mouth 2 (Two) Times a Day As Needed for Muscle Spasms., Disp: 60 tablet, Rfl: 2  •  diazePAM (VALIUM) 5 MG tablet, TK 1 T  PO TID PRN FOR SPASMS, Disp: , Rfl: 0  •  FOLIC ACID PO, Take  by mouth Daily., Disp: , Rfl:   •  ibuprofen (ADVIL,MOTRIN) 200 MG tablet, Take 2 tablets by mouth Every 12 (Twelve) Hours As Needed for Mild Pain ., Disp: 60 tablet, Rfl: 2  •  losartan (COZAAR) 100 MG tablet, Take  by mouth Daily., Disp: , Rfl: 0  •  Multiple Vitamins-Minerals (ONE-A-DAY WOMENS 50+ ADVANTAGE PO), Take  by mouth Daily., Disp: , Rfl:   •  Omega 3-6-9 Fatty Acids (OMEGA-3 & OMEGA-6 FISH OIL) capsule, Take  by mouth Daily., Disp: , Rfl:   •  traMADol (ULTRAM) 50 MG tablet, Take 1 tablet by mouth Daily As Needed for Moderate Pain ., Disp: 30 tablet,  "Rfl: 0    IMAGING  MRI lumbar spine 3-28-18 and 9-26-14:       Imaging last reviewed: 09/06/18     PFSH:  The following portions of the patient's history were reviewed and updated as appropriate: problem list, past medical history, past surgery history, social history, family history, medications, and allergies    Review of Systems   Constitutional: Positive for fatigue.   HENT: Negative for congestion.    Eyes: Positive for visual disturbance (blurry vision).   Respiratory: Positive for shortness of breath. Negative for cough and wheezing.    Cardiovascular: Positive for chest pain. Negative for palpitations and leg swelling.   Gastrointestinal: Positive for constipation. Negative for diarrhea.   Genitourinary: Negative for difficulty urinating.   Musculoskeletal: Positive for arthralgias (bilateral knees, left foot and ankle) and back pain.   Neurological: Positive for weakness (right foot) and numbness.   Psychiatric/Behavioral: Positive for sleep disturbance. Negative for suicidal ideas. The patient is nervous/anxious.    All other systems reviewed and are negative.      Vitals:    09/06/18 1001   BP: 166/87   Pulse: 83   Resp: 18   Temp: 97.9 °F (36.6 °C)   SpO2: 97%   Weight: 120 kg (264 lb 12.8 oz)   Height: 167.6 cm (65.98\")   PainSc:   7   PainLoc: Back       Physical Exam   Constitutional: She is oriented to person, place, and time. Vital signs are normal. She appears well-developed and well-nourished. She is cooperative.   HENT:   Head: Normocephalic and atraumatic.   Nose: Nose normal.   Eyes: Conjunctivae and lids are normal.   Cardiovascular: Normal rate.    Pulmonary/Chest: Effort normal.   Abdominal:   obese   Musculoskeletal:        Lumbar back: She exhibits decreased range of motion, tenderness, bony tenderness (bilateral L2-L5 facets--moderate tenderness-- + loading manuever) and pain.   +bilateral lumbar facet tenderness  +bilateral lumbar facet loading    Neurological: She is alert and oriented " to person, place, and time. Gait (has left foot in air boot) abnormal.   Skin: Skin is warm, dry and intact.   Psychiatric: She has a normal mood and affect. Her speech is normal and behavior is normal. Judgment and thought content normal. Cognition and memory are normal.   Nursing note and vitals reviewed.    Ortho Exam  Neurologic Exam     Mental Status   Oriented to person, place, and time.   Speech: speech is normal       Lab Results   Component Value Date    POCMETH Negative 04/17/2018    POCAMPHET Negative 04/17/2018    POCBARBITUR Negative 04/17/2018    POCBENZO Negative 04/17/2018    POCCOCAINE Negative 04/17/2018    POCMETHADO Negative 04/17/2018    POCOPIATES Negative 04/17/2018    POCOXYCODO Negative 04/17/2018    POCPHENCYC Negative 04/17/2018    POCPROPOXY Negative 04/17/2018    POCTHC Negative 04/17/2018    POCTRICYC Negative 04/17/2018     Last UDS results reviewed: 09/06/18   Last UDS: 4/2018  Comments: Consistent     Date of last ANT reviewed : 09/06/18   Comments: Cele Ocampo was seen today for back pain.    Diagnoses and all orders for this visit:    Lumbar spondylolysis  -     Case Request    DDD (degenerative disc disease), lumbar  -     Case Request    Chronic midline low back pain with right-sided sciatica  -     Case Request    Chronic pain of both knees    Other orders  -     traMADol (ULTRAM) 50 MG tablet; Take 1 tablet by mouth Daily As Needed for Moderate Pain .      Requested Prescriptions     Signed Prescriptions Disp Refills   • traMADol (ULTRAM) 50 MG tablet 30 tablet 0     Sig: Take 1 tablet by mouth Daily As Needed for Moderate Pain .     - Given good benefit from two previous diagnostic lumbar medial branch blocks, would likely receive longer pain relief with radiofrequency ablation of the lumbar medial branch nerves. Discussed with the patient regarding the etiology of their pain. Informed them that they would likely benefit from a bilateral radiofrequency ablation  of the lumbar medial branch nerves from L2 to L5.  The procedure was described in detail and the risks, benefits and alternatives were discussed with the patient (including but not limited to: bleeding, infection, nerve damage, worsening of pain, inability to perform injection, paralysis, seizures, and death) who agreed to proceed.  Will perform under sedation. Instructed not to eat and have a .    - will start with left side followed by right side 2 weeks later.     - for RLE pain, may benefit from LESI but given it has been several years doubtful it help that much. Will consider in future.   - continue tramdol prn - taking every other day. Will need refill before next apt - will refill today. Continue to take sparingly. Discussed with the patient regarding long-term side effects of opioids including but not limited to dependence, addiction, sedation, respiratory depression, opioid induced hormonal suppression, hyperalgesia, and elevated risk of myocardial infarction.   - continue flexeril prn.   - Random urine drug screen per office policy today, to be checked at next visit.   - Continue home exercise program.   - continue to follow up with ortho for knee pain. Receives steroid injections prn - trying to get gel approved for knees. Needs TKA but has to loose weight.   - neck pain is currently tolerable.  - left foot pain, has tramadol and ibuprofen prn. Will continue to improve and resolve.     Wt Readings from Last 3 Encounters:   09/06/18 120 kg (264 lb 12.8 oz)   08/06/18 120 kg (263 lb 12.8 oz)   06/18/18 122 kg (270 lb)     Body mass index is 42.77 kg/m². Patient counseled on the importance of weight loss to help with overall health and pain control. Patient instructed to attempt weight loss.   Plan: Calorie counting  increase water intake, increase physical activity, reduce screen time and reduce portion size    Follow-up after injection    Dora Tellez MD  Pain Management     [facility]  REPORT  As part of the patient's treatment plan, I am prescribing controlled substances. The patient has been made aware of appropriate use of such medications, including potential risk of somnolence, limited ability to drive and/or work safely, and the potential for dependence or overdose. It has also bee made clear that these medications are for use by this patient only, without concomitant use of alcohol or other substances unless prescribed.   Patient has completed prescribing agreement detailing terms of continued prescribing of controlled substances, including monitoring ANT reports, urine drug screening, and pill counts if necessary. The patient is aware that inappropriate use will results in cessation of prescribing such medications.  ANT report has been reviewed and scanned into the patient's chart.  History and physical exam exhibit continued safe and appropriate use of controlled substances.

## 2018-10-03 RX ORDER — CYCLOBENZAPRINE HCL 5 MG
TABLET ORAL
Qty: 60 TABLET | Refills: 2 | Status: SHIPPED | OUTPATIENT
Start: 2018-10-03 | End: 2018-11-07 | Stop reason: DRUGHIGH

## 2018-10-26 DIAGNOSIS — M51.36 DDD (DEGENERATIVE DISC DISEASE), LUMBAR: Primary | ICD-10-CM

## 2018-10-26 DIAGNOSIS — G89.29 CHRONIC MIDLINE LOW BACK PAIN WITH RIGHT-SIDED SCIATICA: ICD-10-CM

## 2018-10-26 DIAGNOSIS — M54.41 CHRONIC MIDLINE LOW BACK PAIN WITH RIGHT-SIDED SCIATICA: ICD-10-CM

## 2018-10-26 DIAGNOSIS — M43.06 LUMBAR SPONDYLOLYSIS: ICD-10-CM

## 2018-11-07 ENCOUNTER — OFFICE VISIT (OUTPATIENT)
Dept: PAIN MEDICINE | Facility: CLINIC | Age: 58
End: 2018-11-07

## 2018-11-07 VITALS
RESPIRATION RATE: 18 BRPM | WEIGHT: 260 LBS | DIASTOLIC BLOOD PRESSURE: 87 MMHG | SYSTOLIC BLOOD PRESSURE: 165 MMHG | HEIGHT: 66 IN | TEMPERATURE: 97.3 F | HEART RATE: 88 BPM | OXYGEN SATURATION: 93 % | BODY MASS INDEX: 41.78 KG/M2

## 2018-11-07 DIAGNOSIS — M43.06 LUMBAR SPONDYLOLYSIS: ICD-10-CM

## 2018-11-07 DIAGNOSIS — M51.16 HERNIATION OF LUMBAR INTERVERTEBRAL DISC WITH RADICULOPATHY: ICD-10-CM

## 2018-11-07 DIAGNOSIS — M51.36 DDD (DEGENERATIVE DISC DISEASE), LUMBAR: ICD-10-CM

## 2018-11-07 DIAGNOSIS — G89.29 OTHER CHRONIC PAIN: Primary | ICD-10-CM

## 2018-11-07 PROCEDURE — 99214 OFFICE O/P EST MOD 30 MIN: CPT | Performed by: NURSE PRACTITIONER

## 2018-11-07 RX ORDER — CYCLOBENZAPRINE HCL 10 MG
10 TABLET ORAL 2 TIMES DAILY PRN
Qty: 60 TABLET | Refills: 1 | Status: SHIPPED | OUTPATIENT
Start: 2018-11-07 | End: 2018-12-31 | Stop reason: DRUGHIGH

## 2018-11-07 RX ORDER — TRAMADOL HYDROCHLORIDE 50 MG/1
50 TABLET ORAL DAILY PRN
Qty: 30 TABLET | Refills: 0 | Status: SHIPPED | OUTPATIENT
Start: 2018-11-07 | End: 2018-12-29 | Stop reason: SDUPTHER

## 2018-11-07 NOTE — PROGRESS NOTES
CHIEF COMPLAINT  Back pain has increased since last visit.    Subjective   Damaris Williamson is a 58 y.o. female  who presents to the office for follow-up.She has a history of chronic back pain. Reports her back pain is worse since last office visit. Also having other pain issues.     Left foot is fractured. 5 weeks ago. Is in a walking boot for another 6 weeks. Under care of Dr Apple.  Right ankle pain/swelling. Had MRI. Found old fracture and bone spurs. Plan to try boot for 6-8 weeks. Consider surgery. Under care of Dr. Hsieh. Also sees Dr. Hsieh for bilateral knee pain. Received two injections today. Is going to need to lose more weight for TKR, per ortho.     Complains of pain in her low back, hips, knees and feet. Today her pain is 7-8/10VAS (originally said her pain was 9/10VAS). Describes the pain as continuous aching and throbbing. The pain has worsened since last office visit. Pain increases with walking, standing ,being on feet; pain decreases with meditation, ice, reclining position, and medication. Also taking Ibuprofen. Has previously been prescribed Tramadol. Last prescribed September. Is now taking 4-5/week, was previously taking 2-3/week. Pain is always worst at night.  Denies any side effects from the regimen except for constipation. Also had been taking Flexeril 5 mg until recently. Was having increased spasms. Went to ED([facility]). Given Diazepam from ED.  Reviewed this clinic does not prescribe benzo's unless prior to procedure.     Her PT has told her they have nothing else to offer her. They recommend she proceed with the RFA.  She brought records from [facility] dating back from July 2, 2018 and through September.  She has lost weight since last office visit and is continuing to work on lose weight.  Back Pain   This is a chronic problem. The current episode started more than 1 year ago. The problem occurs constantly. The problem has been gradually worsening (temporarily improved  with lumbar MBB; worse from last office visit) since onset. The pain is present in the lumbar spine and sacro-iliac. The quality of the pain is described as aching and shooting. The pain radiates to the left thigh. The pain is at a severity of 9/10. The pain is the same all the time. The symptoms are aggravated by bending, position, sitting, standing and twisting. Stiffness is present in the morning. Associated symptoms include bladder incontinence (unchanged), bowel incontinence (unchanged), leg pain, numbness, paresis, paresthesias and tingling. Pertinent negatives include no abdominal pain, chest pain, dysuria, fever, headaches, pelvic pain, perianal numbness, weakness or weight loss. Risk factors include obesity, menopause, lack of exercise, poor posture and sedentary lifestyle. She has tried analgesics, muscle relaxant and NSAIDs for the symptoms.      Insurance appeal denial.        Past pain medications: states some strong pain medicines in the beginning     Current pain medications:   Flexeril 5 mg prn - sparingly  Ibuprofen/Naproxen- alternates  Tramadol - takes every other day.      Past therapies:  Physical Therapy: yes  Chiropractor: yes- pt states she had a family member who was a chiropractor and he would adjust her for free but he has since passed away.   Massage Therapy: no  TENS: yes  Neck or back surgery: no  Past pain management: yes     Previous Injection: Bilateral L2-5 Lumbar Medial Branch Blockade on 5/2/18  Effect of Injection (%): 50%  Length of Relief: 2-3 months     Previous Injection: Bilateral L2-5 Lumbar Medial Branch Blockade on 8/22/18   Effect of Injection (%): 50%     Length of Relief: 3-4 weeks      Previous Injections: yes at previous pain management, last one maybe [id number]  Effect of Injection (%): 50%  Length of Relief: couple of months  PEG Assessment   What number best describes your pain on average in the past week?8  What number best describes how, during the past week,  "pain has interfered with your enjoyment of life?9  What number best describes how, during the past week, pain has interfered with your general activity?  9    The following portions of the patient's history were reviewed and updated as appropriate: allergies, current medications, past family history, past medical history, past social history, past surgical history and problem list.    Review of Systems   Constitutional: Negative for chills, fever and weight loss.   Respiratory: Negative for shortness of breath.    Cardiovascular: Negative for chest pain.   Gastrointestinal: Positive for bowel incontinence (unchanged) and constipation. Negative for abdominal pain, diarrhea, nausea and vomiting.   Genitourinary: Positive for bladder incontinence (unchanged). Negative for difficulty urinating, dyspareunia, dysuria and pelvic pain.   Musculoskeletal: Positive for back pain.   Neurological: Positive for dizziness, tingling, light-headedness, numbness and paresthesias. Negative for weakness and headaches.   Psychiatric/Behavioral: Positive for sleep disturbance. Negative for confusion, hallucinations, self-injury and suicidal ideas. The patient is not nervous/anxious.        Vitals:    11/07/18 1527   BP: 165/87   Pulse: 88   Resp: 18   Temp: 97.3 °F (36.3 °C)   SpO2: 93%   Weight: 118 kg (260 lb)   Height: 167.6 cm (65.98\")   PainSc:   9   PainLoc: Back     Objective   Physical Exam   Constitutional: She is oriented to person, place, and time. Vital signs are normal. She appears well-developed and well-nourished. She is cooperative.   HENT:   Head: Normocephalic and atraumatic.   Nose: Nose normal.   Eyes: Conjunctivae and lids are normal.   Cardiovascular: Normal rate.    Pulmonary/Chest: Effort normal.   Abdominal:   obese   Musculoskeletal:        Lumbar back: She exhibits tenderness and bony tenderness (bilateral L2-L5 facets--moderate tenderness-- + loading manuever).   Boots of BLE's  Walking with crutches "   Neurological: She is alert and oriented to person, place, and time. Gait abnormal.   Reflex Scores:       Patellar reflexes are 1+ on the right side and 1+ on the left side.  Skin: Skin is warm, dry and intact.   Psychiatric: She has a normal mood and affect. Her speech is normal and behavior is normal. Judgment and thought content normal. Cognition and memory are normal.   Nursing note and vitals reviewed.      Assessment/Plan   Damaris was seen today for back pain.    Diagnoses and all orders for this visit:    Other chronic pain    DDD (degenerative disc disease), lumbar    Lumbar spondylolysis  -     Case Request    Herniation of lumbar intervertebral disc with radiculopathy    Other orders  -     cyclobenzaprine (FLEXERIL) 10 MG tablet; Take 1 tablet by mouth 2 (Two) Times a Day As Needed for Muscle Spasms.  -     traMADol (ULTRAM) 50 MG tablet; Take 1 tablet by mouth Daily As Needed for Moderate Pain .      --- The urine drug screen confirmation from 4-18-18 has been reviewed and the result is APPROPRIATE based on patient history and ANT report  --- Obtain records from [facility].  --- Discontinue Valium. Reviewed concern for interactions with pain medication.  --- Increase Flexeril 10 mg BID PRN. Discussed medication with the patient.  Included in this discussion was the potential for side effects and adverse events.  Patient verbalized understanding and wished to proceed.  Prescription will be sent to pharmacy.  --- Refill Tramadol. Patient appears stable with current regimen. No adverse effects. Regarding continuation of opioids, there is no evidence of aberrant behavior or any red flags.  The patient continues with appropriate response to opioid therapy. ADL's remain intact by self.   --- Bilateral L2-L5 RFL. No blood thinners. Reviewed the procedure at length with the patient.  Included in the review was expectations, complications, risk and benefits.The procedure was described in detail and the  "risks, benefits and alternatives were discussed with the patient (including but not limited to: bleeding, infection, nerve damage, worsening of pain, inability to perform injection, paralysis, seizures, and death) who agreed to proceed.  Discussed the potential for sedation if warranted/wanted.  Questions were answered and in a way the patient could understand.  Patient verbalized understanding and wishes to proceed.  This intervention will be ordered.  --- Continue with ortho and podiatrist as scheduled.   --- Follow-up 1 month or sooner if needed.    -------  Education about Medial Branch Blockade and RF Therapy:    This medial branch blockade (MBB) suggested is intended for diagnostic purposes, with the intent of offering the patient Radiofrequency thermal rhizotomy (RF) if the MBB is diagnostically effective.  The diagnostic blockade is necessary to determine the likelihood that RF therapy could be efficacious in providing long term relief to the patient.    Medial branches are sensory nerve branches that connect to a facet joint and transmit sensations & pain signals from that joint.  Facet is a term for the type of joints found in the spine.  Medial branches are the nerves that go to a facet, and therefore are also sometimes called \"facet joint nerves\" (FJNs).      In a medial branch blockade procedure, xray fluoroscopy is used to verify the locations of the outside of the joint lines which are being targeted.  Under xray guidance, needles are placed to these areas.  Contrast dye is injected to confirm proper placement, with dye flowing over the joint area, and to ensure that the dye does not flow into unintended areas such as a vein.  When this is confirmed, local anesthetic is injected to block the medial branch at that joint level.      If MBBs are diagnostically successful in blocking pain, then the patient is most likely a great candidate for Radiofrequency of those facet joint nerves.  In the RF " procedure, needles are placed to the joint lines in the same fashion, and after testing, the needle tips are heated to thermally treat the nerves, blocking the nerves by in essence damaging the nerves with the heat treatment.       Medically, a successful RF procedure should provide a patient with 50% pain relief or more for at least 6 months.  Clinical experience suggests that successful patients receive relief more in the range of 12 months on average.  We also discussed that a fortunate minority of patients receive therapeutic success from the MBB, and may not require RF ablation.  If a patient receives more than 8 weeks of relief from MBB, then occasional repeat MBB for therapeutic purposes is a very reasonable alternative therapy.  This course of therapy is consistent with our LCDs according to our CMS  in the area, and therefore other insurance providers should follow accordingly.  We will monitor our patients to screen for these therapeutic responders and will offer RF therapy only when necessary.        We discussed that MBB & RF are not without risks.  Guidelines regarding anticoagulant use & neuraxial procedures will be respected.  Patients that are ill or otherwise may be at risk for sepsis will not have their spines accessed by neuraxial injections of any type.  This patient will not be offered these therapies if there is an increased risk.   We discussed that there is a risk of postprocedural pain and also a risk of worsening of clinical picture with these procedures as with any neuraxial procedure.    -------         ANT REPORT    As part of the patient's treatment plan, I am prescribing controlled substances. The patient has been made aware of appropriate use of such medications, including potential risk of somnolence, limited ability to drive and/or work safely, and the potential for dependence or overdose. It has also bee made clear that these medications are for use by this patient  only, without concomitant use of alcohol or other substances unless prescribed.     Patient has completed prescribing agreement detailing terms of continued prescribing of controlled substances, including monitoring ANT reports, urine drug screening, and pill counts if necessary. The patient is aware that inappropriate use will results in cessation of prescribing such medications.    ANT report has been reviewed and scanned into the patient's chart.    As the clinician, I personally reviewed the ANT from 11-6-18 while the patient was in the office today.    History and physical exam exhibit continued safe and appropriate use of controlled substances.      EMR Dragon/Transcription disclaimer:   Much of this encounter note is an electronic transcription/translation of spoken language to printed text. The electronic translation of spoken language may permit erroneous, or at times, nonsensical words or phrases to be inadvertently transcribed; Although I have reviewed the note for such errors, some may still exist.

## 2018-12-05 ENCOUNTER — RESULTS ENCOUNTER (OUTPATIENT)
Dept: PAIN MEDICINE | Facility: CLINIC | Age: 58
End: 2018-12-05

## 2018-12-05 ENCOUNTER — OFFICE VISIT (OUTPATIENT)
Dept: PAIN MEDICINE | Facility: CLINIC | Age: 58
End: 2018-12-05

## 2018-12-05 VITALS
WEIGHT: 256 LBS | RESPIRATION RATE: 18 BRPM | SYSTOLIC BLOOD PRESSURE: 157 MMHG | OXYGEN SATURATION: 93 % | HEIGHT: 66 IN | BODY MASS INDEX: 41.14 KG/M2 | HEART RATE: 86 BPM | DIASTOLIC BLOOD PRESSURE: 90 MMHG | TEMPERATURE: 97.4 F

## 2018-12-05 DIAGNOSIS — G89.29 OTHER CHRONIC PAIN: ICD-10-CM

## 2018-12-05 DIAGNOSIS — G89.29 OTHER CHRONIC PAIN: Primary | ICD-10-CM

## 2018-12-05 DIAGNOSIS — M51.36 DDD (DEGENERATIVE DISC DISEASE), LUMBAR: ICD-10-CM

## 2018-12-05 DIAGNOSIS — M43.06 LUMBAR SPONDYLOLYSIS: ICD-10-CM

## 2018-12-05 PROCEDURE — 99213 OFFICE O/P EST LOW 20 MIN: CPT | Performed by: NURSE PRACTITIONER

## 2018-12-05 NOTE — PROGRESS NOTES
CHIEF COMPLAINT  Back pain is unchanged since last visit.    Subjective   Damaris Williamson is a 58 y.o. female  who presents to the office for follow-up.She has a history of chronic back pain. Reports this is unchanged since last office visit.    Complains of pain in her low back. Today her pain is 7/10VAS. Describes the pain as continuous aching and throbbing. Pain increases with walking, standing, household chores; pain decreases with medication, rest , heating pad and injections.  Continues with Tramadol 50 mg 1/day and Flexeril 10 mg 3-4/week. Also takes ibuprofen OTC. Denies any side effects from the regimen. The regimen helps decrease her pain by 30-40%. Notes improvement and function with medication. ADL's by self.     Is still wearing boots of both feet.  Her left foot stress fracture is healing, gets another xray in 2 weeks to see if she can come out of boot. Remains in walking boot for right ankle and sees ortho in a few weeks. No plans for surgery.   Has failed PT after 2-3 months.   Back Pain   This is a chronic problem. The current episode started more than 1 year ago. The problem occurs constantly. The problem has been gradually worsening (temporarily improved with lumbar MBB; wunchanged from last office visit) since onset. The pain is present in the lumbar spine and sacro-iliac. The quality of the pain is described as aching and shooting. The pain radiates to the left thigh. The pain is at a severity of 7/10. The pain is the same all the time. The symptoms are aggravated by bending, position, sitting, standing and twisting. Stiffness is present in the morning. Associated symptoms include bladder incontinence (unchanged), bowel incontinence (unchanged), leg pain, paresis, paresthesias, tingling and weakness. Pertinent negatives include no abdominal pain, chest pain, dysuria, fever, headaches, numbness, pelvic pain, perianal numbness or weight loss. Risk factors include obesity, menopause, lack of  exercise, poor posture and sedentary lifestyle. She has tried analgesics, muscle relaxant and NSAIDs for the symptoms.      Past pain medications: states some strong pain medicines in the beginning     Current pain medications:   Flexeril 5 mg prn - sparingly  Ibuprofen/Naproxen- alternates  Tramadol - takes every other day.      Past therapies:  Physical Therapy: yes  Chiropractor: yes- pt states she had a family member who was a chiropractor and he would adjust her for free but he has since passed away.   Massage Therapy: no  TENS: yes  Neck or back surgery: no  Past pain management: yes     Previous Injection: Bilateral L2-5 Lumbar Medial Branch Blockade on 5/2/18  Effect of Injection (%): 50%  Length of Relief: 2-3 months     Previous Injection: Bilateral L2-5 Lumbar Medial Branch Blockade on 8/22/18   Effect of Injection (%): 50%     Length of Relief: 3-4 weeks      Previous Injections: yes at previous pain management, last one maybe [id number]  Effect of Injection (%): 50%  Length of Relief: couple of months    PEG Assessment   What number best describes your pain on average in the past week?6  What number best describes how, during the past week, pain has interfered with your enjoyment of life?7  What number best describes how, during the past week, pain has interfered with your general activity?  7    The following portions of the patient's history were reviewed and updated as appropriate: allergies, current medications, past family history, past medical history, past social history, past surgical history and problem list.    Review of Systems   Constitutional: Negative for chills, fever and weight loss.   Respiratory: Negative for shortness of breath.    Cardiovascular: Negative for chest pain.   Gastrointestinal: Positive for bowel incontinence (unchanged). Negative for abdominal pain, constipation, diarrhea, nausea and vomiting.   Genitourinary: Positive for bladder incontinence (unchanged). Negative for  "difficulty urinating, dyspareunia, dysuria and pelvic pain.   Musculoskeletal: Positive for back pain.   Neurological: Positive for tingling, weakness and paresthesias. Negative for dizziness, light-headedness, numbness and headaches.   Psychiatric/Behavioral: Negative for confusion, hallucinations, self-injury, sleep disturbance and suicidal ideas. The patient is not nervous/anxious.        Vitals:    12/05/18 1343   BP: 157/90   BP Location: Left arm   Patient Position: Sitting   Cuff Size: Adult   Pulse: 86   Resp: 18   Temp: 97.4 °F (36.3 °C)   TempSrc: Oral   SpO2: 93%   Weight: 116 kg (256 lb)   Height: 167.6 cm (65.98\")   PainSc:   7   PainLoc: Back     Objective   Physical Exam   Constitutional: She is oriented to person, place, and time. Vital signs are normal. She appears well-developed and well-nourished. She is cooperative.   HENT:   Head: Normocephalic and atraumatic.   Nose: Nose normal.   Eyes: Conjunctivae and lids are normal.   Cardiovascular: Normal rate.   Pulmonary/Chest: Effort normal.   Abdominal:   obese   Musculoskeletal:        Lumbar back: She exhibits tenderness and bony tenderness (bilateral L2-L5 facets--moderate tenderness-- + loading manuever).   Boots of BLE's   Neurological: She is alert and oriented to person, place, and time. Gait abnormal.   Reflex Scores:       Patellar reflexes are 1+ on the right side and 1+ on the left side.  Skin: Skin is warm, dry and intact.   Psychiatric: She has a normal mood and affect. Her speech is normal and behavior is normal. Judgment and thought content normal. Cognition and memory are normal.   Nursing note and vitals reviewed.      Assessment/Plan   Damaris was seen today for back pain.    Diagnoses and all orders for this visit:    Other chronic pain  -     Oral Fluid Drug Screen - Saliva, Oral Cavity; Future    DDD (degenerative disc disease), lumbar  -     Oral Fluid Drug Screen - Saliva, Oral Cavity; Future    Lumbar spondylolysis  -     Oral " Fluid Drug Screen - Saliva, Oral Cavity; Future      --- Routine oral drug screen in office today as part of monitoring requirements for controlled substances.   This specimen will be sent to [facility] for confirmation.     --- Continue with regimen. Does not need refill today. Patient appears stable with current regimen. No adverse effects. Regarding continuation of opioids, there is no evidence of aberrant behavior or any red flags.  The patient continues with appropriate response to opioid therapy. ADL's remain intact by self.   --- Proceed with lumbar RFL when approved by insurance. Patient must sign consent for appeal.  --- Follow-up after procedure or sooner if needed.     ANT REPORT    As part of the patient's treatment plan, I am prescribing controlled substances. The patient has been made aware of appropriate use of such medications, including potential risk of somnolence, limited ability to drive and/or work safely, and the potential for dependence or overdose. It has also bee made clear that these medications are for use by this patient only, without concomitant use of alcohol or other substances unless prescribed.     Patient has completed prescribing agreement detailing terms of continued prescribing of controlled substances, including monitoring ANT reports, urine drug screening, and pill counts if necessary. The patient is aware that inappropriate use will results in cessation of prescribing such medications.    ANT report has been reviewed and scanned into the patient's chart.    As the clinician, I personally reviewed the ANT from 12-3-18 while the patient was in the office today.    History and physical exam exhibit continued safe and appropriate use of controlled substances.      EMR Dragon/Transcription disclaimer:   Much of this encounter note is an electronic transcription/translation of spoken language to printed text. The electronic translation of spoken language may  permit erroneous, or at times, nonsensical words or phrases to be inadvertently transcribed; Although I have reviewed the note for such errors, some may still exist.

## 2018-12-31 RX ORDER — TRAMADOL HYDROCHLORIDE 50 MG/1
50 TABLET ORAL DAILY PRN
Qty: 30 TABLET | Refills: 0 | Status: SHIPPED | OUTPATIENT
Start: 2018-12-31 | End: 2019-02-21 | Stop reason: SDUPTHER

## 2018-12-31 RX ORDER — IBUPROFEN 200 MG
TABLET ORAL
Qty: 60 TABLET | Refills: 2 | Status: SHIPPED | OUTPATIENT
Start: 2018-12-31 | End: 2019-05-23 | Stop reason: SDUPTHER

## 2018-12-31 RX ORDER — CYCLOBENZAPRINE HCL 5 MG
TABLET ORAL
Qty: 60 TABLET | Refills: 5 | Status: SHIPPED | OUTPATIENT
Start: 2018-12-31 | End: 2020-01-10 | Stop reason: SDUPTHER

## 2019-01-16 ENCOUNTER — DOCUMENTATION (OUTPATIENT)
Dept: PAIN MEDICINE | Facility: CLINIC | Age: 59
End: 2019-01-16

## 2019-01-16 ENCOUNTER — OUTSIDE FACILITY SERVICE (OUTPATIENT)
Dept: PAIN MEDICINE | Facility: CLINIC | Age: 59
End: 2019-01-16

## 2019-01-16 PROCEDURE — 64635 DESTROY LUMB/SAC FACET JNT: CPT | Performed by: PAIN MEDICINE

## 2019-01-16 PROCEDURE — 99152 MOD SED SAME PHYS/QHP 5/>YRS: CPT | Performed by: PAIN MEDICINE

## 2019-01-16 PROCEDURE — 64636 DESTROY L/S FACET JNT ADDL: CPT | Performed by: PAIN MEDICINE

## 2019-01-16 NOTE — PROGRESS NOTES
RFA UNILATERAL left- L2-5   [facility]    PREOPERATIVE DIAGNOSIS:  Lumbar spondylosis without myelopathy   POSTOPERATIVE DIAGNOSIS:  Lumbar spondylosis without myelopathy     PROCEDURES PERFORMED:   Left  lumbar radiofrequency ablation of the medial branches L2, L3, L4 and L5 at the transverse processes of L3, L4, L5, and the sacral alar groove to thermally treat these 3 facet joints.  1.  34920 - Lumbar radiofrequency ablation 1st level.  2.  35412 - Lumbar radiofrequency ablation 2nd level.  3.  78011 - Lumbar radiofrequency ablation 3rd level.     INFORMED CONSENT:  In preprocedure discussion with the patient, the risks and complications were discussed prior to starting the procedure and informed consent was obtained.     SURGEON:   Dora Tellez MD    INDICATIONS:  The patient presents with chronic lower back pain. The patient underwent 2 Left-sided lumbar medial branch blocks with diagnostically positive relief. Given the patient’s significant pain relief, it is diagnostic that we have likely found the source of the patient’s pain; therefore, lumbar radiofrequency ablation has been indicated.     SEDATION:  Versed 2mg & Fentanyl 50 mcg IV.    TIME OF PROCEDURE:  The Interoperative procedure time, after administration of the IV sedative, was 13 minutes.    ANESTHETIC:  Lidocaine 1% for skin infiltration, 2% lidocaine for injection.    STEROID:  None.    DESCRIPTION OF PROCEDURE:  After obtaining informed consent an IV was  started in the preoperative area. The patient was taken to the operating room. The patient was placed in prone position with a pillow under the abdomen. All pressure points were padded. EKG, blood pressure, and pulse oximetry were monitored. The patient was  sedated. The lumbosacral area was prepped with ChloraPrep and draped in a sterile fashion.     Under fluoroscopic guidance in an oblique dimension aiming medially to above mentioned side, the transverse processes of  the L3, L4, and L5 vertebrae were identified at their junctions with the superior articular processes.  Also identified was the junction of the sacral alar groove with the superior articular process of the sacrum in the same fluoroscopic dimension. The skin overlying these starting points was anesthetized with 1 mL of 1% lidocaine and then the radiofrequency probe needles were advanced down in the oblique view down to these junctions. With the needle tip positioned they were confirmed by AP and oblique views and, after checking for negative aspiration, sensory testing was performed down to 0.58 V at 50 Hz where there was a loss of painful stimulation. There was a lack of radicular stimulation up to 3 V and 2 Hz, therefore giving the appropriate sensory testing and negative motor testing.  Then 1 mL of 2% lidocaine was instilled in each needle.  Two minutes was allowed to elapse, and during this time a lateral fluoroscopic view was taken to confirm that the needles had not advanced nor retracted.  Then radiofrequency lesioning was performed at 80° for 3 minutes. The needles were removed intact.     ESTIMATED BLOOD LOSS:  Minimal.    SPECIMENS:  None.    COMPLICATIONS:  None.    TOLERANCE AND DISCHARGE:  The patient tolerated the procedure well. The patient was transported to the recovery area without difficulties. The patient was discharged home under the care of family in stable and satisfactory condition.    PLAN:  1.  The patient was given our standard instruction sheet.  2.  The patient will resume all medications per the medication reconciliation sheet.  3.  The patient will return to the [facility] for Pain Control for reevaluation in approximately 6 weeks.

## 2019-01-28 ENCOUNTER — TELEPHONE (OUTPATIENT)
Dept: PAIN MEDICINE | Facility: CLINIC | Age: 59
End: 2019-01-28

## 2019-02-11 ENCOUNTER — DOCUMENTATION (OUTPATIENT)
Dept: PAIN MEDICINE | Facility: CLINIC | Age: 59
End: 2019-02-11

## 2019-02-11 ENCOUNTER — OUTSIDE FACILITY SERVICE (OUTPATIENT)
Dept: PAIN MEDICINE | Facility: CLINIC | Age: 59
End: 2019-02-11

## 2019-02-11 PROCEDURE — 64636 DESTROY L/S FACET JNT ADDL: CPT | Performed by: PAIN MEDICINE

## 2019-02-11 PROCEDURE — 99152 MOD SED SAME PHYS/QHP 5/>YRS: CPT | Performed by: PAIN MEDICINE

## 2019-02-11 PROCEDURE — 64635 DESTROY LUMB/SAC FACET JNT: CPT | Performed by: PAIN MEDICINE

## 2019-02-11 NOTE — PROGRESS NOTES
RFA UNILATERAL right- L2-5   [facility]    PREOPERATIVE DIAGNOSIS:  Lumbar spondylosis without myelopathy   POSTOPERATIVE DIAGNOSIS:  Lumbar spondylosis without myelopathy     PROCEDURES PERFORMED:   Right  lumbar radiofrequency ablation of the medial branches L2, L3, L4 and L5 at the transverse processes of L3, L4, L5, and the sacral alar groove to thermally treat these 3 facet joints.  1.  97571 - Lumbar radiofrequency ablation 1st level.  2.  87397 - Lumbar radiofrequency ablation 2nd level.  3.  92665 - Lumbar radiofrequency ablation 3rd level.     INFORMED CONSENT:  In preprocedure discussion with the patient, the risks and complications were discussed prior to starting the procedure and informed consent was obtained.     SURGEON:   Dora Tellez MD    INDICATIONS:  The patient presents with chronic lower back pain. The patient underwent 2 Right-sided lumbar medial branch blocks with diagnostically positive relief. Given the patient’s significant pain relief, it is diagnostic that we have likely found the source of the patient’s pain; therefore, lumbar radiofrequency ablation has been indicated.     SEDATION:  Versed 2mg & Fentanyl 50 mcg IV.    TIME OF PROCEDURE:  The Interoperative procedure time, after administration of the IV sedative, was 11 minutes.    ANESTHETIC:  Lidocaine 1% for skin infiltration, 2% lidocaine for injection.    STEROID:  None.    DESCRIPTION OF PROCEDURE:  After obtaining informed consent an IV was  started in the preoperative area. The patient was taken to the operating room. The patient was placed in prone position with a pillow under the abdomen. All pressure points were padded. EKG, blood pressure, and pulse oximetry were monitored. The patient was  sedated. The lumbosacral area was prepped with ChloraPrep and draped in a sterile fashion.     Under fluoroscopic guidance in an oblique dimension aiming medially to above mentioned side, the transverse processes  of the L3, L4, and L5 vertebrae were identified at their junctions with the superior articular processes.  Also identified was the junction of the sacral alar groove with the superior articular process of the sacrum in the same fluoroscopic dimension. The skin overlying these starting points was anesthetized with 1 mL of 1% lidocaine and then the radiofrequency probe needles were advanced down in the oblique view down to these junctions. With the needle tip positioned they were confirmed by AP and oblique views and, after checking for negative aspiration, sensory testing was performed down to 0.54 V at 50 Hz where there was a loss of painful stimulation. There was a lack of radicular stimulation up to 3 V and 2 Hz, therefore giving the appropriate sensory testing and negative motor testing.  Then 1 mL of 2% lidocaine was instilled in each needle.  Two minutes was allowed to elapse, and during this time a lateral fluoroscopic view was taken to confirm that the needles had not advanced nor retracted.  Then radiofrequency lesioning was performed at 80° for 3 minutes. The needles were removed intact.     ESTIMATED BLOOD LOSS:  Minimal.    SPECIMENS:  None.    COMPLICATIONS:  None.    TOLERANCE AND DISCHARGE:  The patient tolerated the procedure well. The patient was transported to the recovery area without difficulties. The patient was discharged home under the care of family in stable and satisfactory condition.    PLAN:  1.  The patient was given our standard instruction sheet.  2.  The patient will resume all medications per the medication reconciliation sheet.  3.  The patient will return to the [facility] for Pain Control for reevaluation in approximately 6 weeks.

## 2019-02-21 ENCOUNTER — OFFICE VISIT (OUTPATIENT)
Dept: PAIN MEDICINE | Facility: CLINIC | Age: 59
End: 2019-02-21

## 2019-02-21 VITALS
OXYGEN SATURATION: 98 % | TEMPERATURE: 97.9 F | HEIGHT: 66 IN | WEIGHT: 252 LBS | SYSTOLIC BLOOD PRESSURE: 170 MMHG | HEART RATE: 91 BPM | RESPIRATION RATE: 16 BRPM | BODY MASS INDEX: 40.5 KG/M2 | DIASTOLIC BLOOD PRESSURE: 82 MMHG

## 2019-02-21 DIAGNOSIS — G89.29 CHRONIC BILATERAL LOW BACK PAIN WITH RIGHT-SIDED SCIATICA: ICD-10-CM

## 2019-02-21 DIAGNOSIS — M51.36 DDD (DEGENERATIVE DISC DISEASE), LUMBAR: Primary | ICD-10-CM

## 2019-02-21 DIAGNOSIS — M54.41 CHRONIC BILATERAL LOW BACK PAIN WITH RIGHT-SIDED SCIATICA: ICD-10-CM

## 2019-02-21 DIAGNOSIS — M43.06 LUMBAR SPONDYLOLYSIS: ICD-10-CM

## 2019-02-21 PROCEDURE — 99214 OFFICE O/P EST MOD 30 MIN: CPT | Performed by: PAIN MEDICINE

## 2019-02-21 RX ORDER — TRAMADOL HYDROCHLORIDE 50 MG/1
50 TABLET ORAL DAILY PRN
Qty: 30 TABLET | Refills: 0 | Status: SHIPPED | OUTPATIENT
Start: 2019-02-21 | End: 2019-05-21 | Stop reason: SDUPTHER

## 2019-02-21 NOTE — PROGRESS NOTES
"CHIEF COMPLAINT: Back Pain    HPI  Damaris Williamson is a 58 y.o. female.  She is here to follow up for Back Pain    Damaris Williamson is a 58 y.o. female  who presents to the office for follow-up.    Pt is having significant relief of L sided LBP since 1/16/19 RF; However, R sided LBP is \"like a deep bruise\" since 2/11/19R sided RF.  Since last visit their pain has improved.   Day of procedure, had some weakness in right hip/dragging right hip- has resolved. Tingling in anterior thigh. Deep ache in right side of the low back.     No problems with left side - pain has improved.     The patient states their pain is a 8 on a scale of 1-10.  The patient describes this pain as constant dull and ache.  The pain is located in right low back and does radiate anterior and lateral right thigh. This painful problem is aggravated by physical activity and is alleviated by past injection and pain medication.    Possible right TKA, evaluation in May by ortho. Has received bilateral knee steroid injections with minimal benefit.   Also evaluated for right ankle pain. Out of boot today but suppose to wear [place] boot for several more weeks before discussing possible surgery.   Given HEP for low back pain.     Patient is 100% disabled due to her low back and knees. She is fully disabled from working at previous occupation and any other occupation or type of employment. Has disability paperwork today she is asking me to fill out.     Past pain medications: states some strong pain medicines in the beginning     Current pain medications:   Flexeril 5 mg prn - sparingly  Ibuprofen/Naproxen- alternates  Tramadol - takes every other day.      Past therapies:  Physical Therapy: yes  Chiropractor: yes- pt states she had a family member who was a chiropractor and he would adjust her for free but he has since passed away.   Massage Therapy: no  TENS: yes  Neck or back surgery: no  Past pain management: yes     Previous Injection: Bilateral L2-L5 " RFA - 1/16/19 and 2/11/19 - left side significant better - right side - minimal improvement.      Previous Injection: Bilateral L2-5 Lumbar Medial Branch Blockade on 8/22/18 - 5/2/2018  Effect of Injection (%): 50%     Length of Relief: 3-months     Previous Injections: yes at previous pain management, last one maybe [id number]  Effect of Injection (%): 50%  Length of Relief: couple of months    PEG Assessment   What number best describes your pain on average in the past week? 8  What number best describes how, during the past week, pain has interfered with your enjoyment of life? 9  What number best describes how, during the past week, pain has interfered with your general activity? 9      Current Outpatient Medications:   •  ascorbic acid (HM VITAMIN C) 1000 MG tablet, Take 1,000 mg by mouth Daily., Disp: , Rfl:   •  aspirin 81 MG EC tablet, Take 81 mg by mouth Daily., Disp: , Rfl:   •  calcium carbonate-vitamin d (CALTRATE 600+D) 600-400 MG-UNIT per tablet, Take 1 tablet by mouth Daily., Disp: , Rfl:   •  chlorthalidone (HYGROTON) 25 MG tablet, Take 25 mg by mouth Daily., Disp: , Rfl: 0  •  cyanocobalamin 1000 MCG/ML injection, INJECT 1 ML ONCE A WEEK FOR 4 WEEKS AND THEN ONCE A MONTH FOR 5 MONTHS AS DIRECTED., Disp: , Rfl: 3  •  cyclobenzaprine (FLEXERIL) 5 MG tablet, TAKE 1 TABLET BY MOUTH TWICE DAILY AS NEEDED FOR MUSCLE SPASMS, Disp: 60 tablet, Rfl: 5  •  FOLIC ACID PO, Take  by mouth Daily., Disp: , Rfl:   •  ibuprofen (ADVIL,MOTRIN) 200 MG tablet, TAKE 2 TABLETS BY MOUTH EVERY 12 HOURS AS NEEDED FOR MILD PAIN, Disp: 60 tablet, Rfl: 2  •  losartan (COZAAR) 100 MG tablet, Take  by mouth Daily., Disp: , Rfl: 0  •  Multiple Vitamins-Minerals (ONE-A-DAY WOMENS 50+ ADVANTAGE PO), Take  by mouth Daily., Disp: , Rfl:   •  Omega 3-6-9 Fatty Acids (OMEGA-3 & OMEGA-6 FISH OIL) capsule, Take  by mouth Daily., Disp: , Rfl:   •  traMADol (ULTRAM) 50 MG tablet, Take 1 tablet by mouth Daily As Needed for Moderate Pain .,  "Disp: 30 tablet, Rfl: 0    PFSH:  The following portions of the patient's history were reviewed and updated as appropriate: problem list, past medical history, past surgery history, social history, family history, medications, and allergies    Review of Systems   Constitutional: Positive for activity change (decreased). Negative for chills and fever.   Respiratory: Negative for apnea and shortness of breath.    Cardiovascular: Negative for chest pain.   Gastrointestinal: Positive for constipation. Negative for abdominal pain, diarrhea, nausea and vomiting.   Genitourinary: Negative for difficulty urinating, dyspareunia, dysuria and pelvic pain.   Musculoskeletal: Positive for back pain and gait problem (R hip pain  \"new\").   Neurological: Positive for weakness (Rleg), numbness and headaches. Negative for dizziness and light-headedness. Seizures: R leg,R thigh.   Psychiatric/Behavioral: Positive for sleep disturbance. Negative for confusion, hallucinations, self-injury and suicidal ideas. The patient is not nervous/anxious.    All other systems reviewed and are negative.      Vitals:    02/21/19 0837   BP: 170/82   Pulse: 91   Resp: 16   Temp: 97.9 °F (36.6 °C)   SpO2: 98%   Weight: 114 kg (252 lb)   Height: 167.6 cm (65.98\")   PainSc: 8  Comment: R sided LBP a 8/10; L side a 3/10       Physical Exam   Constitutional: She is oriented to person, place, and time. Vital signs are normal. She appears well-developed and well-nourished. She is cooperative.   HENT:   Head: Normocephalic and atraumatic.   Nose: Nose normal.   Eyes: Conjunctivae and lids are normal.   Cardiovascular: Normal rate.   Pulmonary/Chest: Effort normal.   Abdominal:   obese   Musculoskeletal:        Lumbar back: She exhibits tenderness and bony tenderness.   Neurological: She is alert and oriented to person, place, and time. Gait abnormal.   Skin: Skin is warm, dry and intact.   Psychiatric: She has a normal mood and affect. Her speech is normal and " behavior is normal. Judgment and thought content normal. Cognition and memory are normal.   Nursing note and vitals reviewed.    Ortho Exam  Neurologic Exam     Mental Status   Oriented to person, place, and time.   Speech: speech is normal       Lab Results   Component Value Date    POCMETH Negative 04/17/2018    POCAMPHET Negative 04/17/2018    POCBARBITUR Negative 04/17/2018    POCBENZO Negative 04/17/2018    POCCOCAINE Negative 04/17/2018    POCMETHADO Negative 04/17/2018    POCOPIATES Negative 04/17/2018    POCOXYCODO Negative 04/17/2018    POCPHENCYC Negative 04/17/2018    POCPROPOXY Negative 04/17/2018    POCTHC Negative 04/17/2018    POCTRICYC Negative 04/17/2018     Last UDS results reviewed: 02/21/19   Last UDS: 12/2018  Comments: Consistent       Date of last ANT reviewed : 02/21/19   Comments: Consistent         Damaris was seen today for back pain.    Diagnoses and all orders for this visit:    DDD (degenerative disc disease), lumbar    Lumbar spondylolysis    Chronic bilateral low back pain with right-sided sciatica    Other orders  -     traMADol (ULTRAM) 50 MG tablet; Take 1 tablet by mouth Daily As Needed for Moderate Pain .      Requested Prescriptions     Signed Prescriptions Disp Refills   • traMADol (ULTRAM) 50 MG tablet 30 tablet 0     Sig: Take 1 tablet by mouth Daily As Needed for Moderate Pain .     --- Last UDS performed was reviewed and consistent.    - for low back: left side has improved as expected. Right side seems flared up from RFA - which is not uncommon and should settle down shortly and give her the same relief as the left side. Still a lot of discomfort today. She is to remain on medication regimen until right side can improve.   - will refill tramadol.   --- Continue with regimen. Does not need refill today. Patient appears stable with current regimen. No adverse effects. Regarding continuation of opioids, there is no evidence of aberrant behavior or any red flags.  The patient  continues with appropriate response to opioid therapy. ADL's remain intact by self.   - filled out paperwork as best as I could.   - This was a 25 minute face to face visit with 15 minutes spent counseling on medication, usage and treatment plan.    - Follow-up as needed for pain     Wt Readings from Last 3 Encounters:   02/21/19 114 kg (252 lb)   12/05/18 116 kg (256 lb)   11/07/18 118 kg (260 lb)     Body mass index is 40.69 kg/m². weight loss of #30 over lat year. Intentional. Started walking again. Patient counseled on the importance of weight loss to help with overall health and pain control. Patient instructed to attempt weight loss.   Plan: Calorie counting  reduce portion size, cut out extra servings and reduce fast food intake    Follow-up as needed for pain     Dora Tellez MD  Pain Management     ANT REPORT  As part of the patient's treatment plan, I am prescribing controlled substances. The patient has been made aware of appropriate use of such medications, including potential risk of somnolence, limited ability to drive and/or work safely, and the potential for dependence or overdose. It has also bee made clear that these medications are for use by this patient only, without concomitant use of alcohol or other substances unless prescribed.   Patient has completed prescribing agreement detailing terms of continued prescribing of controlled substances, including monitoring ANT reports, urine drug screening, and pill counts if necessary. The patient is aware that inappropriate use will results in cessation of prescribing such medications.  ANT report has been reviewed and scanned into the patient's chart.  History and physical exam exhibit continued safe and appropriate use of controlled substances.

## 2019-05-21 ENCOUNTER — RESULTS ENCOUNTER (OUTPATIENT)
Dept: PAIN MEDICINE | Facility: CLINIC | Age: 59
End: 2019-05-21

## 2019-05-21 ENCOUNTER — OFFICE VISIT (OUTPATIENT)
Dept: PAIN MEDICINE | Facility: CLINIC | Age: 59
End: 2019-05-21

## 2019-05-21 VITALS
RESPIRATION RATE: 18 BRPM | HEIGHT: 66 IN | SYSTOLIC BLOOD PRESSURE: 142 MMHG | TEMPERATURE: 97.7 F | DIASTOLIC BLOOD PRESSURE: 85 MMHG | OXYGEN SATURATION: 96 % | HEART RATE: 79 BPM | WEIGHT: 246.6 LBS | BODY MASS INDEX: 39.63 KG/M2

## 2019-05-21 DIAGNOSIS — G89.29 CHRONIC PAIN OF BOTH KNEES: Primary | ICD-10-CM

## 2019-05-21 DIAGNOSIS — G89.29 CHRONIC MIDLINE LOW BACK PAIN WITH RIGHT-SIDED SCIATICA: ICD-10-CM

## 2019-05-21 DIAGNOSIS — M43.06 LUMBAR SPONDYLOLYSIS: ICD-10-CM

## 2019-05-21 DIAGNOSIS — M25.561 CHRONIC PAIN OF BOTH KNEES: Primary | ICD-10-CM

## 2019-05-21 DIAGNOSIS — M54.41 CHRONIC MIDLINE LOW BACK PAIN WITH RIGHT-SIDED SCIATICA: ICD-10-CM

## 2019-05-21 DIAGNOSIS — M51.36 DDD (DEGENERATIVE DISC DISEASE), LUMBAR: ICD-10-CM

## 2019-05-21 DIAGNOSIS — M25.562 CHRONIC PAIN OF BOTH KNEES: Primary | ICD-10-CM

## 2019-05-21 DIAGNOSIS — G89.29 CHRONIC PAIN OF BOTH KNEES: ICD-10-CM

## 2019-05-21 DIAGNOSIS — M25.562 CHRONIC PAIN OF BOTH KNEES: ICD-10-CM

## 2019-05-21 DIAGNOSIS — M25.561 CHRONIC PAIN OF BOTH KNEES: ICD-10-CM

## 2019-05-21 PROCEDURE — 99214 OFFICE O/P EST MOD 30 MIN: CPT | Performed by: PAIN MEDICINE

## 2019-05-21 PROCEDURE — 80305 DRUG TEST PRSMV DIR OPT OBS: CPT | Performed by: PAIN MEDICINE

## 2019-05-21 RX ORDER — CETIRIZINE HYDROCHLORIDE 10 MG/1
10 TABLET ORAL DAILY
Refills: 0 | COMMUNITY
Start: 2019-03-02

## 2019-05-21 RX ORDER — TRAMADOL HYDROCHLORIDE 50 MG/1
50 TABLET ORAL DAILY PRN
Qty: 30 TABLET | Refills: 1 | Status: SHIPPED | OUTPATIENT
Start: 2019-05-21 | End: 2019-09-10 | Stop reason: SDUPTHER

## 2019-05-21 NOTE — PROGRESS NOTES
CHIEF COMPLAINT: Back Pain    HPI  Damaris Williamson is a 58 y.o. female.  She is here to follow up for Back Pain    Damaris Williamson is a 58 y.o. female  who presents to the office for follow-up.    Since last visit her low back pain has improved. RFA have helped and soreness has improved. Low back spasms have improved and using less flexeril.   Right knee pain is worse pain today.    The patient states their pain is a 6 on a scale of 1-10.  The patient describes this pain as constant dull and ache.  This painful problem is aggravated by weight bearing and is alleviated by rest, not weight bearing, and pain medication.     Possible right TKA, by Dr. Salinas. Has evaluation at the end May. Has received bilateral knee steroid injections with minimal benefit.   Also evaluated for right ankle pain. Discussing possible surgery for reconstruction. Still wearing right ankle brace and right knee brace today.      Patient is 100% disabled due to her low back and knees. She is fully disabled from working at previous occupation and any other occupation or type of employment. Has disability paperwork today she is asking me to fill out.     Went to ED for kidney stone, given norco - took 1-2 and has the rest for destruction.     Past pain medications: states some strong pain medicines in the beginning     Current pain medications:   Flexeril 5 mg prn - sparingly  Ibuprofen/Naproxen- alternates  Tramadol - takes every other day.      Past therapies:  Physical Therapy: yes  Chiropractor: yes- pt states she had a family member who was a chiropractor and he would adjust her for free but he has since passed away.   Massage Therapy: no  TENS: yes  Neck or back surgery: no  Past pain management: yes     Previous Injection: Bilateral L2-L5 RFA - 1/16/19 and 2/11/19 - left side significant better - right side - minimal improvement.      Previous Injection: Bilateral L2-5 Lumbar Medial Branch Blockade on 8/22/18 - 5/2/2018  Effect of Injection  (%): 50%     Length of Relief: 3-months     Previous Injections: yes at previous pain management, last one maybe [id number]  Effect of Injection (%): 50%  Length of Relief: couple of months     PEG Assessment   What number best describes your pain on average in the past week? 7  What number best describes how, during the past week, pain has interfered with your enjoyment of life? 8  What number best describes how, during the past week, pain has interfered with your general activity? 8      Current Outpatient Medications:   •  ascorbic acid (HM VITAMIN C) 1000 MG tablet, Take 1,000 mg by mouth Daily., Disp: , Rfl:   •  aspirin 81 MG EC tablet, Take 81 mg by mouth Daily., Disp: , Rfl:   •  calcium carbonate-vitamin d (CALTRATE 600+D) 600-400 MG-UNIT per tablet, Take 1 tablet by mouth Daily., Disp: , Rfl:   •  cyanocobalamin 1000 MCG/ML injection, INJECT 1 ML ONCE A WEEK FOR 4 WEEKS AND THEN ONCE A MONTH FOR 5 MONTHS AS DIRECTED., Disp: , Rfl: 3  •  cyclobenzaprine (FLEXERIL) 5 MG tablet, TAKE 1 TABLET BY MOUTH TWICE DAILY AS NEEDED FOR MUSCLE SPASMS, Disp: 60 tablet, Rfl: 5  •  ibuprofen (ADVIL,MOTRIN) 200 MG tablet, TAKE 2 TABLETS BY MOUTH EVERY 12 HOURS AS NEEDED FOR MILD PAIN, Disp: 60 tablet, Rfl: 2  •  Multiple Vitamins-Minerals (ONE-A-DAY WOMENS 50+ ADVANTAGE PO), Take  by mouth Daily., Disp: , Rfl:   •  Omega 3-6-9 Fatty Acids (OMEGA-3 & OMEGA-6 FISH OIL) capsule, Take  by mouth Daily., Disp: , Rfl:   •  traMADol (ULTRAM) 50 MG tablet, Take 1 tablet by mouth Daily As Needed for Moderate Pain ., Disp: 30 tablet, Rfl: 0  •  cetirizine (zyrTEC) 10 MG tablet, Take 10 mg by mouth Daily., Disp: , Rfl: 0  •  chlorthalidone (HYGROTON) 25 MG tablet, Take 25 mg by mouth Daily., Disp: , Rfl: 0  •  FOLIC ACID PO, Take  by mouth Daily., Disp: , Rfl:   •  losartan (COZAAR) 100 MG tablet, Take  by mouth Daily., Disp: , Rfl: 0    IMAGING  MRI lumbar spine 3-28-18 and 9-26-14:       Imaging last reviewed: 05/21/19     PFSH:  The  "following portions of the patient's history were reviewed and updated as appropriate: problem list, past medical history, past surgery history, social history, family history, medications, and allergies    Review of Systems   Constitutional: Negative for fatigue.   HENT: Positive for congestion.    Eyes: Negative for visual disturbance.   Respiratory: Positive for cough. Negative for shortness of breath and wheezing.    Cardiovascular: Negative.    Gastrointestinal: Negative for constipation and diarrhea.   Genitourinary: Negative for difficulty urinating.   Musculoskeletal: Positive for back pain and joint swelling (right knee and ankle).   Neurological: Positive for numbness (right leg). Negative for weakness.   Psychiatric/Behavioral: Positive for sleep disturbance. Negative for suicidal ideas. The patient is nervous/anxious.    All other systems reviewed and are negative.      Vitals:    05/21/19 1014   BP: 142/85   Pulse: 79   Resp: 18   Temp: 97.7 °F (36.5 °C)   SpO2: 96%   Weight: 112 kg (246 lb 9.6 oz)   Height: 167.6 cm (65.98\")   PainSc:   6   PainLoc: Back       Physical Exam   Constitutional: She is oriented to person, place, and time. Vital signs are normal. She appears well-developed and well-nourished. She is cooperative.   HENT:   Head: Normocephalic and atraumatic.   Nose: Nose normal.   Eyes: Conjunctivae and lids are normal.   Cardiovascular: Normal rate.   Pulmonary/Chest: Effort normal.   Abdominal:   obese   Musculoskeletal:        Right knee: She exhibits decreased range of motion and swelling. Tenderness found.        Right ankle: She exhibits decreased range of motion and swelling.        Lumbar back: She exhibits tenderness and bony tenderness.   Neurological: She is alert and oriented to person, place, and time. Gait abnormal.   Skin: Skin is warm, dry and intact.   Psychiatric: She has a normal mood and affect. Her speech is normal and behavior is normal. Judgment and thought content " normal. Cognition and memory are normal.   Nursing note and vitals reviewed.    Ortho Exam  Neurologic Exam     Mental Status   Oriented to person, place, and time.   Speech: speech is normal       Lab Results   Component Value Date    POCMETH Negative 04/17/2018    POCAMPHET Negative 04/17/2018    POCBARBITUR Negative 04/17/2018    POCBENZO Negative 04/17/2018    POCCOCAINE Negative 04/17/2018    POCMETHADO Negative 04/17/2018    POCOPIATES Negative 04/17/2018    POCOXYCODO Negative 04/17/2018    POCPHENCYC Negative 04/17/2018    POCPROPOXY Negative 04/17/2018    POCTHC Negative 04/17/2018    POCTRICYC Negative 04/17/2018     Last UDS results reviewed: 05/21/19   Last UDS: 4/2018  Comments: Consistent     Date of last ANT reviewed : 05/21/19   Comments: Consistent         Damaris was seen today for back pain.    Diagnoses and all orders for this visit:    Chronic pain of both knees    Chronic midline low back pain with right-sided sciatica    DDD (degenerative disc disease), lumbar    Lumbar spondylolysis      Requested Prescriptions     Pending Prescriptions Disp Refills   • traMADol (ULTRAM) 50 MG tablet 30 tablet 1     Sig: Take 1 tablet by mouth Daily As Needed for Moderate Pain .     --- Last UDS performed was reviewed and consistent. Random urine drug screen per office policy today, to be checked at next visit.     - for low back: both sides have improved with RFA. Can repeat this every 6 months if pain returns or worsens. Done 2/2019.     -  will refill tramadol. will call if she receives any outside pain medication.   - Continue with regimen. Does not need refill today. Patient appears stable with current regimen. No adverse effects. Regarding continuation of opioids, there is no evidence of aberrant behavior or any red flags.  The patient continues with appropriate response to opioid therapy. ADL's remain intact by self.   - follow up with ortho for right knee/ankle - possible surgery - ok to receive post  operative pain medications.   - This was a 25 minute face to face visit with 15 minutes spent counseling on medication, usage and treatment plan.    - Follow-up as needed for pain     Wt Readings from Last 3 Encounters:   05/21/19 112 kg (246 lb 9.6 oz)   02/21/19 114 kg (252 lb)   12/05/18 116 kg (256 lb)     Body mass index is 39.83 kg/m². Patient counseled on the importance of weight loss to help with overall health and pain control. Patient instructed to attempt weight loss.   Plan: Calorie counting  reduce screen time and reduce portion size    Follow-up as needed for pain     Dora Tellez MD  Pain Management     ANT REPORT  As part of the patient's treatment plan, I am prescribing controlled substances. The patient has been made aware of appropriate use of such medications, including potential risk of somnolence, limited ability to drive and/or work safely, and the potential for dependence or overdose. It has also bee made clear that these medications are for use by this patient only, without concomitant use of alcohol or other substances unless prescribed.   Patient has completed prescribing agreement detailing terms of continued prescribing of controlled substances, including monitoring ANT reports, urine drug screening, and pill counts if necessary. The patient is aware that inappropriate use will results in cessation of prescribing such medications.  ANT report has been reviewed and scanned into the patient's chart.  History and physical exam exhibit continued safe and appropriate use of controlled substances.

## 2019-05-23 RX ORDER — IBUPROFEN 200 MG
TABLET ORAL
Qty: 60 TABLET | Refills: 6 | Status: SHIPPED | OUTPATIENT
Start: 2019-05-23 | End: 2021-03-23

## 2019-06-13 ENCOUNTER — OFFICE VISIT (OUTPATIENT)
Dept: PAIN MEDICINE | Facility: CLINIC | Age: 59
End: 2019-06-13

## 2019-06-13 VITALS
HEART RATE: 77 BPM | OXYGEN SATURATION: 99 % | WEIGHT: 246.6 LBS | DIASTOLIC BLOOD PRESSURE: 83 MMHG | SYSTOLIC BLOOD PRESSURE: 157 MMHG | TEMPERATURE: 98 F | BODY MASS INDEX: 39.63 KG/M2 | HEIGHT: 66 IN | RESPIRATION RATE: 20 BRPM

## 2019-06-13 DIAGNOSIS — G89.29 OTHER CHRONIC PAIN: Primary | ICD-10-CM

## 2019-06-13 DIAGNOSIS — M43.06 LUMBAR SPONDYLOLYSIS: ICD-10-CM

## 2019-06-13 DIAGNOSIS — G89.29 CHRONIC PAIN OF BOTH KNEES: ICD-10-CM

## 2019-06-13 DIAGNOSIS — M51.36 DDD (DEGENERATIVE DISC DISEASE), LUMBAR: ICD-10-CM

## 2019-06-13 DIAGNOSIS — M25.562 CHRONIC PAIN OF BOTH KNEES: ICD-10-CM

## 2019-06-13 DIAGNOSIS — M25.561 CHRONIC PAIN OF BOTH KNEES: ICD-10-CM

## 2019-06-13 PROCEDURE — 99214 OFFICE O/P EST MOD 30 MIN: CPT | Performed by: NURSE PRACTITIONER

## 2019-06-13 RX ORDER — BLOOD-GLUCOSE METER
KIT MISCELLANEOUS
Refills: 3 | COMMUNITY
Start: 2019-05-28

## 2019-06-13 NOTE — PROGRESS NOTES
"CHIEF COMPLAINT  F/u back pain. Pt sts pain has improved on the left posterior lumbar since RFA, pt having same amount of pain on right posterior lumbar following RFA on 2/11/19. Pt sts receiving 90% pain relief to left posterior lumbar after RFA since procedure to now still having improved pain relief.     Subjective   Damaris Williamson is a 58 y.o. female  who presents to the office for follow-up.She has a history of chronic back pain. Reports her pain is improved since RFA on left side. Still having right sided low back pain. Also complains of right knee and right ankle pain.     Having right TKR 7-18-19 with Dr Hsieh.  Still having right ankle pain. Wearing a brace. May need ankle reconstruction. Will re-evaluate after the knee surgery.    Complains of pain in her low back, mainly in her right side. Today her pain is 6/10VAs. Describes the pain as nearly continuous aching and throbbing. Pain increases with prolonged standing, prolonged position; pain decreases with medication, rest, stretching, TENS unit, ice. \"I try meditation and ice before I go to the pain medication.\"  Continues with Tramadol 50 mg 3-5/day, Ibuprofen 200 mg 1 BID, occasionally will take 2 at a time. Also taking Flexeril 5 mg PRN. Denies any side effects from the regimen. The regimen helps decrease her pain moderately. ADL\"s by self. Denies any bowel or bladder changes.   Asking for updated disability paperwork that Dr Tellez used to do.   Back Pain   This is a chronic problem. The current episode started more than 1 year ago. The problem occurs constantly. The problem has been gradually worsening (improved with RFL; unchanged from last office visit) since onset. The pain is present in the lumbar spine and sacro-iliac. The quality of the pain is described as aching and shooting. The pain radiates to the left thigh. The pain is at a severity of 7/10. The pain is the same all the time. The symptoms are aggravated by bending, position, sitting, " standing and twisting. Stiffness is present in the morning. Associated symptoms include bladder incontinence (unchanged), bowel incontinence (unchanged), leg pain, numbness (right leg), paresis, paresthesias and tingling. Pertinent negatives include no abdominal pain, chest pain, dysuria, fever, headaches, pelvic pain, perianal numbness, weakness or weight loss. Risk factors include obesity, menopause, lack of exercise, poor posture and sedentary lifestyle. She has tried analgesics, muscle relaxant and NSAIDs for the symptoms.   Joint Pain   This is a chronic problem. The current episode started more than 1 year ago. The problem occurs constantly. The problem has been gradually worsening. Associated symptoms include arthralgias, joint swelling (right knee and ankle) and numbness (right leg). Pertinent negatives include no abdominal pain, chest pain, congestion, coughing, fatigue, fever, headaches, neck pain or weakness.      Past pain medications: states some strong pain medicines in the beginning     Current pain medications:   Flexeril 5 mg prn - sparingly  Ibuprofen/Naproxen- alternates  Tramadol - takes every other day.      Past therapies:  Physical Therapy: yes  Chiropractor: yes- pt states she had a family member who was a chiropractor and he would adjust her for free but he has since passed away.   Massage Therapy: no  TENS: yes  Neck or back surgery: no  Past pain management: yes     Previous Injection: Bilateral L2-L5 RFA - 1/16/19 and 2/11/19 - left side significant better - right side - minimal improvement.      Previous Injection: Bilateral L2-5 Lumbar Medial Branch Blockade on 8/22/18 - 5/2/2018  Effect of Injection (%): 50%     Length of Relief: 3-months     Previous Injections: yes at previous pain management, last one maybe [id number]  Effect of Injection (%): 50%  Length of Relief: couple of months    PEG Assessment   What number best describes your pain on average in the past week?8  What number  "best describes how, during the past week, pain has interfered with your enjoyment of life?7  What number best describes how, during the past week, pain has interfered with your general activity?  7    The following portions of the patient's history were reviewed and updated as appropriate: allergies, current medications, past family history, past medical history, past social history, past surgical history and problem list.    Review of Systems   Constitutional: Negative for activity change, fatigue, fever and weight loss.   HENT: Negative for congestion.    Eyes: Negative for visual disturbance.   Respiratory: Negative for cough, shortness of breath and wheezing.    Cardiovascular: Negative.  Negative for chest pain.   Gastrointestinal: Positive for bowel incontinence (unchanged) and diarrhea (occ). Negative for abdominal pain and constipation.   Genitourinary: Positive for bladder incontinence (unchanged). Negative for difficulty urinating, dysuria and pelvic pain.   Musculoskeletal: Positive for arthralgias, back pain and joint swelling (right knee and ankle). Negative for neck pain.   Neurological: Positive for tingling, numbness (right leg) and paresthesias. Negative for weakness and headaches.   Psychiatric/Behavioral: Positive for sleep disturbance. Negative for suicidal ideas. The patient is not nervous/anxious.    All other systems reviewed and are negative.      Vitals:    06/13/19 0903   BP: 157/83   Pulse: 77   Resp: 20   Temp: 98 °F (36.7 °C)   SpO2: 99%   Weight: 112 kg (246 lb 9.6 oz)   Height: 167.6 cm (65.98\")   PainSc:   6   PainLoc: Back     Objective   Physical Exam   Constitutional: She is oriented to person, place, and time. Vital signs are normal. She appears well-developed and well-nourished. She is cooperative.   HENT:   Head: Normocephalic and atraumatic.   Nose: Nose normal.   Eyes: Conjunctivae and lids are normal.   Neck: Trachea normal. Neck supple.   Cardiovascular: Normal rate. "   Pulmonary/Chest: Effort normal.   Abdominal: Normal appearance.   Musculoskeletal:        Right knee: She exhibits decreased range of motion. Tenderness found.        Right ankle: She exhibits decreased range of motion.        Lumbar back: She exhibits decreased range of motion, tenderness and bony tenderness.   Moderate tenderness of right SI joint, minimal of left  Minimal lumbar facet tenderness    Brace of right ankle in shoe.   Neurological: She is alert and oriented to person, place, and time. Gait abnormal.   Reflex Scores:       Patellar reflexes are 1+ on the left side.  Skin: Skin is warm, dry and intact.   Psychiatric: She has a normal mood and affect. Her speech is normal and behavior is normal. Judgment and thought content normal. Cognition and memory are normal.   Nursing note and vitals reviewed.      Assessment/Plan   Damaris was seen today for back pain.    Diagnoses and all orders for this visit:    Other chronic pain    DDD (degenerative disc disease), lumbar    Lumbar spondylolysis    Chronic pain of both knees      --- The urine drug screen confirmation from 5-21-19 has been reviewed and the result is APPROPRIATE based on patient history and ANT report  --- consider right SI joint injection. Patient wants to wait at this time due to impending surgery.   --- Continue with medication regimen as needed. No changes. Patient appears stable with current regimen. No adverse effects. Regarding continuation of opioids, there is no evidence of aberrant behavior or any red flags.  The patient continues with appropriate response to opioid therapy. ADL's remain intact by self.   --- Updated disability paperwork.  --- Continue with Dr saba for TKR.  --- Follow-up PRN       ANT REPORT    As part of the patient's treatment plan, I am prescribing controlled substances. The patient has been made aware of appropriate use of such medications, including potential risk of somnolence, limited ability to drive  and/or work safely, and the potential for dependence or overdose. It has also bee made clear that these medications are for use by this patient only, without concomitant use of alcohol or other substances unless prescribed.     Patient has completed prescribing agreement detailing terms of continued prescribing of controlled substances, including monitoring ANT reports, urine drug screening, and pill counts if necessary. The patient is aware that inappropriate use will results in cessation of prescribing such medications.    ANT report has been reviewed and scanned into the patient's chart.    As the clinician, I personally reviewed the ANT from 6-11-19 while the patient was in the office today.    History and physical exam exhibit continued safe and appropriate use of controlled substances.      EMR Dragon/Transcription disclaimer:   Much of this encounter note is an electronic transcription/translation of spoken language to printed text. The electronic translation of spoken language may permit erroneous, or at times, nonsensical words or phrases to be inadvertently transcribed; Although I have reviewed the note for such errors, some may still exist.

## 2019-09-10 ENCOUNTER — OFFICE VISIT (OUTPATIENT)
Dept: PAIN MEDICINE | Facility: CLINIC | Age: 59
End: 2019-09-10

## 2019-09-10 VITALS
WEIGHT: 239.6 LBS | HEART RATE: 82 BPM | OXYGEN SATURATION: 97 % | HEIGHT: 66 IN | TEMPERATURE: 99.6 F | SYSTOLIC BLOOD PRESSURE: 145 MMHG | DIASTOLIC BLOOD PRESSURE: 83 MMHG | BODY MASS INDEX: 38.51 KG/M2 | RESPIRATION RATE: 20 BRPM

## 2019-09-10 DIAGNOSIS — M51.36 DDD (DEGENERATIVE DISC DISEASE), LUMBAR: ICD-10-CM

## 2019-09-10 DIAGNOSIS — G89.29 OTHER CHRONIC PAIN: Primary | ICD-10-CM

## 2019-09-10 DIAGNOSIS — M43.06 LUMBAR SPONDYLOLYSIS: ICD-10-CM

## 2019-09-10 DIAGNOSIS — M25.562 CHRONIC PAIN OF BOTH KNEES: ICD-10-CM

## 2019-09-10 DIAGNOSIS — G89.29 CHRONIC PAIN OF BOTH KNEES: ICD-10-CM

## 2019-09-10 DIAGNOSIS — M25.561 CHRONIC PAIN OF BOTH KNEES: ICD-10-CM

## 2019-09-10 PROCEDURE — 99214 OFFICE O/P EST MOD 30 MIN: CPT | Performed by: NURSE PRACTITIONER

## 2019-09-10 RX ORDER — OXYCODONE HYDROCHLORIDE AND ACETAMINOPHEN 5; 325 MG/1; MG/1
TABLET ORAL
Refills: 0 | COMMUNITY
Start: 2019-08-15 | End: 2020-05-01

## 2019-09-10 RX ORDER — TRAMADOL HYDROCHLORIDE 50 MG/1
50 TABLET ORAL DAILY PRN
Qty: 30 TABLET | Refills: 1 | Status: SHIPPED | OUTPATIENT
Start: 2019-09-10 | End: 2020-01-10 | Stop reason: SDUPTHER

## 2019-09-10 NOTE — PROGRESS NOTES
"CHIEF COMPLAINT  F/u back pain. Pt sts back pain has remained the same since last ov. Pt had RTK on 7/18/19. Pt still in pain, is 7 weeks post op, is in PT, back pain is increased at times during and after PT.   Pt has been using ice on back and tens unit at home for back pain. Pt has been using flexeril, oxycodone 5mg from surgeon, and tramadol to manage pain at home.     Subjective   Damaris Williamson is a 59 y.o. female  who presents to the office for follow-up.She has a history of chronic back pain. REports this is unchanged since last office visit. She is s/p right TKR on 7-18-19(Dr GREEN).  Is currently in PT.  Still having right ankle pain. Wearing a brace. May need ankle reconstruction. Will re-evaluate in a few months but after left knee replacement. Does not have left TKR scheduled yet.     Complains of pain in her low back, right knee and right ankle. Today her pain is 6/10VAS. Describes the pain as intermittent aching and throbbing. Pain increases with walking, standing, activity and PT; pain decreases with medication, TENS unit, rest and procedures.  Previously prescribed Tramadol 50 mg 3-5/day, Ibuprofen 200 mg 1 BID, occasionally will take 2 at a time. Also taking Flexeril 5 mg PRN. However, she was temporarily prescribed percocet 5/325 from surgeon. Takes very intermittently.  Denies any side effects from the regimen. The regimen helps decrease her pain moderately. ADL\"s by self. Denies any bowel or bladder changes.     Back Pain   This is a chronic problem. The current episode started more than 1 year ago. The problem occurs constantly. The problem has been gradually worsening (improved with RFL; unchanged from last office visit) since onset. The pain is present in the lumbar spine and sacro-iliac. The quality of the pain is described as aching and shooting. The pain radiates to the left thigh. The pain is at a severity of 6/10. The pain is the same all the time. The symptoms are aggravated by bending, " position, sitting, standing and twisting. Stiffness is present in the morning. Associated symptoms include bladder incontinence (unchanged), bowel incontinence (unchanged), leg pain, numbness (right leg from right knee down to right foot), paresis, paresthesias, tingling and weakness (right leg). Pertinent negatives include no abdominal pain, chest pain, dysuria, fever, headaches, pelvic pain, perianal numbness or weight loss. Risk factors include obesity, menopause, lack of exercise, poor posture and sedentary lifestyle. She has tried analgesics, muscle relaxant and NSAIDs for the symptoms.   Joint Pain   This is a chronic problem. The current episode started more than 1 year ago. The problem occurs constantly. The problem has been gradually worsening (variable since last office visit). Associated symptoms include arthralgias (right hip), fatigue (occ), joint swelling (right knee and right miles, in a brace), numbness (right leg from right knee down to right foot) and weakness (right leg). Pertinent negatives include no abdominal pain, chest pain, congestion, coughing, fever, headaches or neck pain.      Past pain medications: states some strong pain medicines in the beginning     Current pain medications:   Flexeril 5 mg prn - sparingly  Ibuprofen/Naproxen- alternates  Tramadol - takes every other day.      Past therapies:  Physical Therapy: yes  Chiropractor: yes- pt states she had a family member who was a chiropractor and he would adjust her for free but he has since passed away.   Massage Therapy: no  TENS: yes  Neck or back surgery: no  Past pain management: yes     Previous Injection: Bilateral L2-L5 RFA - 1/16/19 and 2/11/19 - left side significant better - right side - minimal improvement.      Previous Injection: Bilateral L2-5 Lumbar Medial Branch Blockade on 8/22/18 - 5/2/2018  Effect of Injection (%): 50%     Length of Relief: 3-months     Previous Injections: yes at previous pain management, last one  "maybe [id number]  Effect of Injection (%): 50%  Length of Relief: couple of months    PEG Assessment   What number best describes your pain on average in the past week?3  What number best describes how, during the past week, pain has interfered with your enjoyment of life?5  What number best describes how, during the past week, pain has interfered with your general activity?  3    The following portions of the patient's history were reviewed and updated as appropriate: allergies, current medications, past family history, past medical history, past social history, past surgical history and problem list.    Review of Systems   Constitutional: Positive for activity change (dec) and fatigue (occ). Negative for fever and weight loss.   HENT: Negative for congestion.    Eyes: Negative for visual disturbance.   Respiratory: Negative for cough, shortness of breath and wheezing.    Cardiovascular: Negative.  Negative for chest pain.   Gastrointestinal: Positive for bowel incontinence (unchanged). Negative for abdominal pain, constipation and diarrhea (occ).   Genitourinary: Positive for bladder incontinence (unchanged). Negative for difficulty urinating, dysuria and pelvic pain.   Musculoskeletal: Positive for arthralgias (right hip), back pain (occ), gait problem (cane) and joint swelling (right knee and right miles, in a brace). Negative for neck pain.   Neurological: Positive for tingling, weakness (right leg), numbness (right leg from right knee down to right foot) and paresthesias. Negative for dizziness, light-headedness and headaches.   Psychiatric/Behavioral: Positive for sleep disturbance (occ). Negative for suicidal ideas. The patient is not nervous/anxious.    All other systems reviewed and are negative.      Vitals:    09/10/19 1402   BP: 145/83   Pulse: 82   Resp: 20   Temp: 99.6 °F (37.6 °C)   SpO2: 97%   Weight: 109 kg (239 lb 9.6 oz)   Height: 167.6 cm (65.98\")   PainSc:   6   PainLoc: Knee  Comment: right "     Objective   Physical Exam   Constitutional: She is oriented to person, place, and time. Vital signs are normal. She appears well-developed and well-nourished. She is cooperative.   HENT:   Head: Normocephalic and atraumatic.   Nose: Nose normal.   Eyes: Conjunctivae and lids are normal.   Neck: Trachea normal. Neck supple.   Cardiovascular: Normal rate.   Pulmonary/Chest: Effort normal.   Abdominal: Normal appearance.   Musculoskeletal:        Right knee: She exhibits decreased range of motion. Tenderness found.        Right ankle: She exhibits decreased range of motion.        Lumbar back: She exhibits decreased range of motion, tenderness and bony tenderness.   mild tenderness of right SI joint, minimal of left  Minimal lumbar facet tenderness    Brace of right ankle in shoe.   Neurological: She is alert and oriented to person, place, and time. Gait abnormal.   Skin: Skin is warm, dry and intact.   Psychiatric: She has a normal mood and affect. Her speech is normal and behavior is normal. Judgment and thought content normal. Cognition and memory are normal.   Nursing note and vitals reviewed.      Assessment/Plan   Damaris was seen today for back pain and knee pain.    Diagnoses and all orders for this visit:    Other chronic pain    DDD (degenerative disc disease), lumbar    Lumbar spondylolysis    Chronic pain of both knees    Other orders  -     traMADol (ULTRAM) 50 MG tablet; Take 1 tablet by mouth Daily As Needed for Moderate Pain .      --- The urine drug screen confirmation from 5-21-19 has been reviewed and the result is APPROPRIATE based on patient history and ANT report  --- Stop Oxycodone.  --- Resume Tramadol 50 mg nightly PRN. Discussed medication with the patient.  Included in this discussion was the potential for side effects and adverse events.  Patient verbalized understanding and wished to proceed.  Prescription will be sent to pharmacy.  --- Hold lumbar interventions at this time (< 90 days  post-op)  ---  Continue with other specialists as continued.  --- Follow-up 2 months or sooner if needed.     ANT REPORT    As part of the patient's treatment plan, I am prescribing controlled substances. The patient has been made aware of appropriate use of such medications, including potential risk of somnolence, limited ability to drive and/or work safely, and the potential for dependence or overdose. It has also bee made clear that these medications are for use by this patient only, without concomitant use of alcohol or other substances unless prescribed.     Patient has completed prescribing agreement detailing terms of continued prescribing of controlled substances, including monitoring ANT reports, urine drug screening, and pill counts if necessary. The patient is aware that inappropriate use will results in cessation of prescribing such medications.    ANT report has been reviewed and scanned into the patient's chart.    As the clinician, I personally reviewed the ANT from 9-9-19 while the patient was in the office today.    History and physical exam exhibit continued safe and appropriate use of controlled substances.      EMR Dragon/Transcription disclaimer:   Much of this encounter note is an electronic transcription/translation of spoken language to printed text. The electronic translation of spoken language may permit erroneous, or at times, nonsensical words or phrases to be inadvertently transcribed; Although I have reviewed the note for such errors, some may still exist.

## 2019-11-06 ENCOUNTER — OFFICE VISIT (OUTPATIENT)
Dept: PAIN MEDICINE | Facility: CLINIC | Age: 59
End: 2019-11-06

## 2019-11-06 ENCOUNTER — RESULTS ENCOUNTER (OUTPATIENT)
Dept: PAIN MEDICINE | Facility: CLINIC | Age: 59
End: 2019-11-06

## 2019-11-06 VITALS
TEMPERATURE: 99.2 F | SYSTOLIC BLOOD PRESSURE: 151 MMHG | WEIGHT: 234.2 LBS | BODY MASS INDEX: 37.64 KG/M2 | DIASTOLIC BLOOD PRESSURE: 85 MMHG | RESPIRATION RATE: 20 BRPM | HEART RATE: 94 BPM | HEIGHT: 66 IN | OXYGEN SATURATION: 100 %

## 2019-11-06 DIAGNOSIS — M51.16 HERNIATION OF LUMBAR INTERVERTEBRAL DISC WITH RADICULOPATHY: ICD-10-CM

## 2019-11-06 DIAGNOSIS — M43.06 LUMBAR SPONDYLOLYSIS: ICD-10-CM

## 2019-11-06 DIAGNOSIS — M25.561 CHRONIC PAIN OF BOTH KNEES: ICD-10-CM

## 2019-11-06 DIAGNOSIS — G89.29 CHRONIC PAIN OF BOTH KNEES: ICD-10-CM

## 2019-11-06 DIAGNOSIS — M25.562 CHRONIC PAIN OF BOTH KNEES: ICD-10-CM

## 2019-11-06 DIAGNOSIS — G89.29 OTHER CHRONIC PAIN: ICD-10-CM

## 2019-11-06 DIAGNOSIS — G89.29 OTHER CHRONIC PAIN: Primary | ICD-10-CM

## 2019-11-06 DIAGNOSIS — M51.36 DDD (DEGENERATIVE DISC DISEASE), LUMBAR: ICD-10-CM

## 2019-11-06 PROCEDURE — 80305 DRUG TEST PRSMV DIR OPT OBS: CPT | Performed by: NURSE PRACTITIONER

## 2019-11-06 PROCEDURE — 99214 OFFICE O/P EST MOD 30 MIN: CPT | Performed by: NURSE PRACTITIONER

## 2019-11-06 RX ORDER — TRAZODONE HYDROCHLORIDE 50 MG/1
TABLET ORAL
Status: ON HOLD | COMMUNITY
Start: 2019-09-30 | End: 2021-08-19

## 2019-11-06 RX ORDER — DICLOFENAC SODIUM 75 MG/1
TABLET, DELAYED RELEASE ORAL
COMMUNITY
Start: 2019-09-30 | End: 2021-03-23

## 2019-11-06 RX ORDER — GEMFIBROZIL 600 MG/1
TABLET, FILM COATED ORAL
COMMUNITY
Start: 2019-10-14 | End: 2023-03-09

## 2019-11-06 RX ORDER — AMOXICILLIN 500 MG/1
CAPSULE ORAL
Refills: 0 | COMMUNITY
Start: 2019-09-27 | End: 2020-01-10

## 2019-11-06 RX ORDER — ANTIARTHRITIC COMBINATION NO.2 900 MG
TABLET ORAL
COMMUNITY
Start: 2019-09-11 | End: 2020-01-10

## 2019-11-06 RX ORDER — MAGNESIUM 200 MG
1 TABLET ORAL
COMMUNITY
End: 2021-03-23

## 2019-11-06 RX ORDER — MAGNESIUM GLUCONATE 27 MG(500)
TABLET ORAL
COMMUNITY
Start: 2019-09-30 | End: 2020-01-10

## 2019-11-06 NOTE — PROGRESS NOTES
"CHIEF COMPLAINT  F/u back pain. Pt sts back pain has worsened since last ov. Pt sts has been having a lot of back spasms, has been using TENS unit and ice to her back for the pain, has also taken tramadol and flexeril to control back pain. Pt had RTK on 7/18/19. Pt has been using diclofenac gel for right knee and has been improving knee pain, pt sts PT also improves pain. Pt sts used to use motorized scooter at the grocery store, pt sts now can walk with a cane at the grocery.     Subjective   Damaris Williamson is a 59 y.o. female  who presents to the office for follow-up.She has a history of chronic back and knee pain. Her knee pain is improved since last office visit. S/P right TKR 7-18-19. Low back pain is worse since last office visit.    Is doing PT at home twice weekly, also doing daily HEP.    Complains of pain in her low back and right knee. Today her pain is 7/10VAS.  Describes the pain as nearly continuous aching and throbbing. Pain increases with walking, standing, activity; pain decreases with medication, rest, PT.  Continues with Tramadol 50 mg 1-2/day, Ibuprofen 200 mg 1 BID, and flexeril 5 mg 1/day PRN (avg-3-5/week). Also using Voltaren gel on her knee.  Denies any side effects from the regimen except constipation. Takes OTC Colace PRN.  The regimen helps decrease her pain moderately. ADL\"s by self. Denies any bowel or bladder changes.     Back Pain   This is a chronic problem. The current episode started more than 1 year ago. The problem occurs constantly. The problem has been gradually worsening (improved with RFL; worsefrom last office visit) since onset. The pain is present in the lumbar spine and sacro-iliac. The quality of the pain is described as aching and shooting. The pain radiates to the left thigh. The pain is at a severity of 7/10. The pain is the same all the time. The symptoms are aggravated by bending, position, sitting, standing and twisting. Stiffness is present in the morning. " Associated symptoms include bladder incontinence (unchanged), bowel incontinence (unchanged), leg pain, numbness (right leg from right knee down to right foot), paresis, paresthesias, tingling and weakness (right leg). Pertinent negatives include no abdominal pain, chest pain, dysuria, fever, headaches, pelvic pain, perianal numbness or weight loss. Risk factors include obesity, menopause, lack of exercise, poor posture and sedentary lifestyle. She has tried analgesics, muscle relaxant and NSAIDs for the symptoms.   Joint Pain   This is a chronic problem. The current episode started more than 1 year ago. The problem occurs constantly. The problem has been gradually worsening (improved since last office visit--Voltaren gel and PT). Associated symptoms include arthralgias (right knee), fatigue (occ), joint swelling (right knee), numbness (right leg from right knee down to right foot) and weakness (right leg). Pertinent negatives include no abdominal pain, chest pain, congestion, coughing, fever, headaches or neck pain.      She is s/p right TKR on 7-18-19(Dr MAST).  Past pain medications: states some strong pain medicines in the beginning     Current pain medications:   Flexeril 5 mg prn - sparingly  Ibuprofen/Naproxen- alternates  Tramadol - takes every other day.      Past therapies:  Physical Therapy: yes  Chiropractor: yes- pt states she had a family member who was a chiropractor and he would adjust her for free but he has since passed away.   Massage Therapy: no  TENS: yes  Neck or back surgery: no  Past pain management: yes     Previous Injection: Bilateral L2-L5 RFA - 1/16/19 and 2/11/19 - left side significant better - right side - minimal improvement.      Previous Injection: Bilateral L2-5 Lumbar Medial Branch Blockade on 8/22/18 - 5/2/2018  Effect of Injection (%): 50%     Length of Relief: 3-months     Previous Injections: yes at previous pain management, last one maybe [id number]  Effect of Injection (%):  "50%  Length of Relief: couple of months    PEG Assessment   What number best describes your pain on average in the past week?7  What number best describes how, during the past week, pain has interfered with your enjoyment of life?7  What number best describes how, during the past week, pain has interfered with your general activity?  7    The following portions of the patient's history were reviewed and updated as appropriate: allergies, current medications, past family history, past medical history, past social history, past surgical history and problem list.    Review of Systems   Constitutional: Positive for activity change (improved) and fatigue (occ). Negative for fever and weight loss.   HENT: Negative for congestion.    Eyes: Negative for visual disturbance.   Respiratory: Negative for cough, shortness of breath and wheezing.    Cardiovascular: Negative.  Negative for chest pain.   Gastrointestinal: Positive for bowel incontinence (unchanged) and constipation (occ takes colace prn). Negative for abdominal pain and diarrhea (occ).   Genitourinary: Positive for bladder incontinence (unchanged). Negative for difficulty urinating, dysuria and pelvic pain.   Musculoskeletal: Positive for arthralgias (right knee), back pain (occ), gait problem (cane) and joint swelling (right knee). Negative for neck pain.   Neurological: Positive for tingling, weakness (right leg), numbness (right leg from right knee down to right foot) and paresthesias. Negative for dizziness, light-headedness and headaches.   Psychiatric/Behavioral: Positive for sleep disturbance (occ). Negative for suicidal ideas. The patient is not nervous/anxious.    All other systems reviewed and are negative.      Vitals:    11/06/19 1148   BP: 151/85   Pulse: 94   Resp: 20   Temp: 99.2 °F (37.3 °C)   SpO2: 100%   Weight: 106 kg (234 lb 3.2 oz)   Height: 167.6 cm (65.98\")   PainSc:   7   PainLoc: Knee  Comment: right     Objective   Physical Exam "   Constitutional: She is oriented to person, place, and time. Vital signs are normal. She appears well-developed and well-nourished. She is cooperative.   HENT:   Head: Normocephalic and atraumatic.   Nose: Nose normal.   Eyes: Conjunctivae and lids are normal.   Neck: Trachea normal. Neck supple.   Cardiovascular: Normal rate.   Pulmonary/Chest: Effort normal.   Abdominal: Normal appearance.   Musculoskeletal:        Right knee: She exhibits decreased range of motion. Tenderness found.        Right ankle: She exhibits decreased range of motion.        Lumbar back: She exhibits decreased range of motion, tenderness and bony tenderness (moderate L2-L4 facet tenderness; + loading manuever).   mild tenderness of right SI joint, minimal of left     Neurological: She is alert and oriented to person, place, and time. Gait abnormal.   Skin: Skin is warm, dry and intact.   Psychiatric: She has a normal mood and affect. Her speech is normal and behavior is normal. Judgment and thought content normal. Cognition and memory are normal.   Nursing note and vitals reviewed.      Assessment/Plan   Damaris was seen today for back pain and knee pain.    Diagnoses and all orders for this visit:    Other chronic pain    Herniation of lumbar intervertebral disc with radiculopathy    DDD (degenerative disc disease), lumbar    Lumbar spondylolysis  -     Case Request    Chronic pain of both knees      --- Routine UDS in office today as part of monitoring requirements for controlled substances.  The specimen was viewed and the immunoassay result reviewed and is NEGATIVE(APPROPRIATE POC).  This specimen will be sent to [facility] laboratory for confirmation.   Last dose of Tramadol was two nights ago.   --- bilateral L2-L4 MBB. No blood thinners. Reviewed the procedure at length with the patient.  Included in the review was expectations, complications, risk and benefits.The procedure was described in detail and the risks, benefits and  "alternatives were discussed with the patient (including but not limited to: bleeding, infection, nerve damage, worsening of pain, inability to perform injection, paralysis, seizures, and death) who agreed to proceed.  Discussed the potential for sedation if warranted/wanted.  The procedure will plan to be performed at [facility] with fluoroscopic guidance(unless ultrasound is indicated). Questions were answered and in a way the patient could understand.  Patient verbalized understanding and wishes to proceed.  This intervention will be ordered.  Discussed with patient that all procedures are part of a multimodal plan of care and include either formal PT or a home exercise program.    --- Continue with regimen. Does not need refill today. Will call as needed. Patient appears stable with current regimen. No adverse effects. Regarding continuation of opioids, there is no evidence of aberrant behavior or any red flags.  The patient continues with appropriate response to opioid therapy. ADL's remain intact by self.   --- Follow-up 3 months or sooner if needed.    -------  Education about Medial Branch Blockade and RF Therapy:    This medial branch blockade (MBB) suggested is intended for diagnostic purposes, with the intent of offering the patient Radiofrequency thermal rhizotomy (RF) if the MBB is diagnostically effective.  The diagnostic blockade is necessary to determine the likelihood that RF therapy could be efficacious in providing long term relief to the patient.    Medial branches are sensory nerve branches that connect to a facet joint and transmit sensations & pain signals from that joint.  Facet is a term for the type of joints found in the spine.  Medial branches are the nerves that go to a facet, and therefore are also sometimes called \"facet joint nerves\" (FJNs).      In a medial branch blockade procedure, xray fluoroscopy is used to verify the locations of the outside of the joint " lines which are being targeted.  Under xray guidance, needles are placed to these areas.  Contrast dye is injected to confirm proper placement, with dye flowing over the joint area, and to ensure that the dye does not flow into unintended areas such as a vein.  When this is confirmed, local anesthetic is injected to block the medial branch at that joint level.      If MBBs are diagnostically successful in blocking pain, then the patient is most likely a great candidate for Radiofrequency of those facet joint nerves.  In the RF procedure, needles are placed to the joint lines in the same fashion, and after testing, the needle tips are heated to thermally treat the nerves, blocking the nerves by in essence damaging the nerves with the heat treatment.       Medically, a successful RF procedure should provide a patient with 50% pain relief or more for at least 6 months.  Clinical experience suggests that successful patients receive relief more in the range of 12 months on average.  We also discussed that a fortunate minority of patients receive therapeutic success from the MBB, and may not require RF ablation.  If a patient receives more than 8 weeks of relief from MBB, then occasional repeat MBB for therapeutic purposes is a very reasonable alternative therapy.  This course of therapy is consistent with our LCDs according to our CMS  in the area, and therefore other insurance providers should follow accordingly.  We will monitor our patients to screen for these therapeutic responders and will offer RF therapy only when necessary.        We discussed that MBB & RF are not without risks.  Guidelines regarding anticoagulant use & neuraxial procedures will be respected.  Patients that are ill or otherwise may be at risk for sepsis will not have their spines accessed by neuraxial injections of any type.  This patient will not be offered these therapies if there is an increased risk.   We discussed that there is  a risk of postprocedural pain and also a risk of worsening of clinical picture with these procedures as with any neuraxial procedure.    -------           ANT REPORT    As part of the patient's treatment plan, I am prescribing controlled substances. The patient has been made aware of appropriate use of such medications, including potential risk of somnolence, limited ability to drive and/or work safely, and the potential for dependence or overdose. It has also bee made clear that these medications are for use by this patient only, without concomitant use of alcohol or other substances unless prescribed.     Patient has completed prescribing agreement detailing terms of continued prescribing of controlled substances, including monitoring ANT reports, urine drug screening, and pill counts if necessary. The patient is aware that inappropriate use will results in cessation of prescribing such medications.    ANT report has been reviewed and scanned into the patient's chart.    As the clinician, I personally reviewed the ANT from 11-5-19 while the patient was in the office today.    History and physical exam exhibit continued safe and appropriate use of controlled substances.      EMR Dragon/Transcription disclaimer:   Much of this encounter note is an electronic transcription/translation of spoken language to printed text. The electronic translation of spoken language may permit erroneous, or at times, nonsensical words or phrases to be inadvertently transcribed; Although I have reviewed the note for such errors, some may still exist.

## 2020-01-10 ENCOUNTER — OFFICE VISIT (OUTPATIENT)
Dept: PAIN MEDICINE | Facility: CLINIC | Age: 60
End: 2020-01-10

## 2020-01-10 VITALS
RESPIRATION RATE: 16 BRPM | DIASTOLIC BLOOD PRESSURE: 82 MMHG | SYSTOLIC BLOOD PRESSURE: 152 MMHG | TEMPERATURE: 98 F | HEIGHT: 66 IN | OXYGEN SATURATION: 98 % | WEIGHT: 233.8 LBS | HEART RATE: 81 BPM | BODY MASS INDEX: 37.57 KG/M2

## 2020-01-10 DIAGNOSIS — M54.41 CHRONIC MIDLINE LOW BACK PAIN WITH RIGHT-SIDED SCIATICA: ICD-10-CM

## 2020-01-10 DIAGNOSIS — M51.36 DDD (DEGENERATIVE DISC DISEASE), LUMBAR: ICD-10-CM

## 2020-01-10 DIAGNOSIS — G89.29 CHRONIC MIDLINE LOW BACK PAIN WITH RIGHT-SIDED SCIATICA: ICD-10-CM

## 2020-01-10 DIAGNOSIS — G89.29 OTHER CHRONIC PAIN: ICD-10-CM

## 2020-01-10 DIAGNOSIS — M43.06 LUMBAR SPONDYLOLYSIS: Primary | ICD-10-CM

## 2020-01-10 PROCEDURE — 99214 OFFICE O/P EST MOD 30 MIN: CPT | Performed by: NURSE PRACTITIONER

## 2020-01-10 RX ORDER — TRAMADOL HYDROCHLORIDE 50 MG/1
50 TABLET ORAL DAILY PRN
Qty: 30 TABLET | Refills: 1 | Status: SHIPPED | OUTPATIENT
Start: 2020-01-10 | End: 2020-05-01 | Stop reason: SDUPTHER

## 2020-01-10 RX ORDER — CYCLOBENZAPRINE HCL 5 MG
5 TABLET ORAL 2 TIMES DAILY PRN
Qty: 60 TABLET | Refills: 5 | Status: SHIPPED | OUTPATIENT
Start: 2020-01-10 | End: 2020-07-01 | Stop reason: SDUPTHER

## 2020-01-10 NOTE — PROGRESS NOTES
CHIEF COMPLAINT  F/U back pain- patient states that her pain has worsened since her last visit. She states that she feels like the RF has worn off and the pain has acutually moved robinson. She is starting to have pain in her mid back .     Initial evaluation by CARLITA Read   Damaris Williamson is a 59 y.o. female  who presents to the office for follow-up.She has a history of back pain.  She completed a Left L2-L5 RFL on 1/16/19 and a Right L2-L5 RFL on 2/11/19 with Dr. Tellez.  She states her pain is returning in her low back and she now has mid back pain as well.  Today her pain is 7/10VAS in severity.  She describes her pain as continuous aching with intermittent sharp pains.  It is worsened with prolonged walking, prolonged activity, bending, twisting, and lifting.  Her pain is improved with medication, ice, and TENS unit. Continues with Tramadol 50 mg 4/week, Ibuprofen 200 mg 2 tablets once a day, and flexeril 5 mg 4/week. Also using Voltaren gel on her knee, hands, and wrist.  Tried gel on back with no improvement.  Denies any side effects from the regimen except constipation. Takes daily miralax, dulcolax suppository 2/week and occasional mag citrate (has not needed this since starting daily miralax). Patient complains of increase in bowel leakage.  She states she has had this time to time, but it is now occurring more frequently (3/week) starting roughly 1 month ago.  Patient states she has been more constipated recently.  She denies any change in bladder, she denies any saddle paresthesia.      Currently in right ankle brace, bilateral ankles need to be reconstructed. Appointment scheduled Dr. Belcher, in the meantime will see a Podiatrist to be fit with a left ankle brace.  Right Knee Replaced 7/18/19-Dr. Hsieh    Patient concerned about steroids used in MBB as she has had issues with her BP and blood glucose after previous MBB in which steroids were used.  I explained that we can proceed  with Diagnostic MBB without the use of steroids which will provide only temporary relief with the goal of repeating her RFL. Patient is agreeable to this plan.     Back Pain   This is a chronic problem. The current episode started more than 1 year ago. The problem occurs constantly. The problem has been gradually worsening (improved with RFL; worse from last office visit) since onset. The pain is present in the lumbar spine and sacro-iliac. The quality of the pain is described as aching and shooting. The pain radiates to the left thigh. The pain is at a severity of 7/10. The pain is the same all the time. The symptoms are aggravated by bending, position, sitting, standing and twisting. Stiffness is present in the morning. Associated symptoms include bladder incontinence (unchanged), bowel incontinence (unchanged), leg pain, numbness (right leg from right knee down to right foot), paresis, paresthesias, tingling and weakness (right leg). Pertinent negatives include no abdominal pain, chest pain, dysuria, fever, headaches, pelvic pain, perianal numbness or weight loss. Risk factors include obesity, menopause, lack of exercise, poor posture and sedentary lifestyle. She has tried analgesics, muscle relaxant and NSAIDs (RFL) for the symptoms. The treatment provided moderate relief.   Joint Pain   This is a chronic problem. The current episode started more than 1 year ago. The problem occurs constantly. The problem has been gradually worsening (Voltaren gel and PT). Associated symptoms include arthralgias (right knee), fatigue (occ), joint swelling (right knee), numbness (right leg from right knee down to right foot) and weakness (right leg). Pertinent negatives include no abdominal pain, chest pain, chills, congestion, coughing, fever, headaches or neck pain.      PEG Assessment   What number best describes your pain on average in the past week?7  What number best describes how, during the past week, pain has interfered  "with your enjoyment of life?8  What number best describes how, during the past week, pain has interfered with your general activity?  8    The following portions of the patient's history were reviewed and updated as appropriate: allergies, current medications, past family history, past medical history, past social history, past surgical history and problem list.    Review of Systems   Constitutional: Positive for activity change (improved) and fatigue (occ). Negative for chills, fever and weight loss.   HENT: Negative for congestion.    Eyes: Negative for visual disturbance.   Respiratory: Negative for cough, chest tightness, shortness of breath and wheezing.    Cardiovascular: Negative.  Negative for chest pain.   Gastrointestinal: Positive for bowel incontinence (unchanged) and constipation (occ takes colace prn). Negative for abdominal pain and diarrhea (occ).   Genitourinary: Positive for bladder incontinence (unchanged). Negative for difficulty urinating, dysuria and pelvic pain.   Musculoskeletal: Positive for arthralgias (right knee), back pain (occ), gait problem (cane) and joint swelling (right knee). Negative for neck pain.   Neurological: Positive for dizziness (occ), tingling, weakness (right leg), light-headedness (occ), numbness (right leg from right knee down to right foot) and paresthesias. Negative for headaches.   Psychiatric/Behavioral: Positive for sleep disturbance (occ). Negative for agitation and suicidal ideas. The patient is nervous/anxious.    All other systems reviewed and are negative.      Vitals:    01/10/20 1303   BP: 152/82   Pulse: 81   Resp: 16   Temp: 98 °F (36.7 °C)   SpO2: 98%   Weight: 106 kg (233 lb 12.8 oz)   Height: 167.6 cm (66\")   PainSc:   7   PainLoc: Back     Objective   Physical Exam   Constitutional: She is oriented to person, place, and time. She appears well-developed and well-nourished.   HENT:   Head: Normocephalic and atraumatic.   Eyes: Conjunctivae and EOM are " normal.   Neck: Normal range of motion.   Cardiovascular: Normal rate.   Pulmonary/Chest: Effort normal.   Musculoskeletal:        Right knee: She exhibits decreased range of motion.        Right ankle: She exhibits decreased range of motion (in brace).        Lumbar back: She exhibits decreased range of motion, tenderness (+ loading maneuver ) and pain.   Neurological: She is alert and oriented to person, place, and time.   Skin: Skin is warm and dry.   Psychiatric: She has a normal mood and affect. Her behavior is normal. Judgment and thought content normal.   Nursing note and vitals reviewed.     Assessment/Plan   Damaris was seen today for back pain.    Diagnoses and all orders for this visit:    Lumbar spondylolysis  -     Case Request    DDD (degenerative disc disease), lumbar    Other chronic pain    Chronic midline low back pain with right-sided sciatica    Other orders  -     cyclobenzaprine (FLEXERIL) 5 MG tablet; Take 1 tablet by mouth 2 (Two) Times a Day As Needed for Muscle Spasms.    --- The urine drug screen confirmation from 11/7/19 has been reviewed and the result is appropriate based on patient history and ANT report  --- Refill Tramadol. Patient appears stable with current regimen. No adverse effects. Regarding continuation of opioids, there is no evidence of aberrant behavior or any red flags.  The patient continues with appropriate response to opioid therapy. ADL's remain intact by self.   --- OIC offered for help with constipation issues, patient declines at this time  --- Reviewed RED FLAG SYMPTOMS, including but not limited to-- reviewed loss of bowel and bladder control and saddle anesthesia. If patient notices this, he is to call office immediately. Patient verbalized understanding.  --- Patient requests to complete previously prescribed Bilateral L2-L4 MBB with NO STEROIDS as she is concerned about her blood glucose control and hypertension.   --- Follow-up after procedure     ANT  REPORT    ANT report has been reviewed and scanned into the patient's chart.    As the clinician, I personally reviewed the ANT from 1/9/2020 while the patient was in the office today.    EMR Dragon/Transcription disclaimer:   Much of this encounter note is an electronic transcription/translation of spoken language to printed text. The electronic translation of spoken language may permit erroneous, or at times, nonsensical words or phrases to be inadvertently transcribed; Although I have reviewed the note for such errors, some may still exist.

## 2020-02-12 ENCOUNTER — DOCUMENTATION (OUTPATIENT)
Dept: PAIN MEDICINE | Facility: CLINIC | Age: 60
End: 2020-02-12

## 2020-02-12 ENCOUNTER — OUTSIDE FACILITY SERVICE (OUTPATIENT)
Dept: PAIN MEDICINE | Facility: CLINIC | Age: 60
End: 2020-02-12

## 2020-02-12 PROCEDURE — 64493 INJ PARAVERT F JNT L/S 1 LEV: CPT | Performed by: ANESTHESIOLOGY

## 2020-02-12 PROCEDURE — 64494 INJ PARAVERT F JNT L/S 2 LEV: CPT | Performed by: ANESTHESIOLOGY

## 2020-02-12 NOTE — PROGRESS NOTES
Bilateral L2-4 Lumbar Medial Branch Blockade  [facility]      PREOPERATIVE DIAGNOSIS:  Lumbar spondylosis without myelopathy    POSTOPERATIVE DIAGNOSIS:  Lumbar spondylosis without myelopathy    PROCEDURE:   Diagnostic Bilateral Lumbar Medial Branch Nerve Blockades, with fluoroscopy:  L2, L3, and L4 nerves (at the L3 & L4 & L5 transverse processes) to block facet joints L3-4 and L4-5 bilaterally  1. 49490-16 -- Bilateral Lumbar Facet blocks, 1st Level  2. 10112-42 -- Bilateral Lumbar Facet blocks, 2nd  Level    PRE-PROCEDURE DISCUSSION WITH PATIENT:    Risks and complications were discussed with the patient prior to starting the procedure and informed consent was obtained.      SURGEON:  Rodrigo Pelayo MD    REASON FOR PROCEDURE:    The patient complains of pain that seems to have a significant axial component and Previous clinically significant therapeutic effect after prior Radiofrequency procedure    SEDATION:  Patient declined administration of moderate sedation    ANESTHETIC:  Marcaine 0.5%  STEROID:  NONE  TOTAL VOLUME OF SOLUTION:  6 mL    DESCRIPTON OF PROCEDURE:  After obtaining informed consent, IV access was not obtained in the preoperative area.   The patient was taken to the operating room.  The patient was placed in the prone position with a pillow under the abdomen. All pressure points were well padded.  EKG, blood pressure, and pulse oximeter were monitored.  The patient was monitored and sedated by the RN under my direction. The lumbosacral area was prepped with Chloraprep and draped in a sterile fashion. Under fluoroscopic guidance the transverse processes of the L3 & L4 & L5 vertebrae at the junctions of the superior articular processes were identified on the right.  Skin and subcutaneous tissue were anesthetized with 1% lidocaine above each of these points. A spinal needle was introduced under fluoroscopic guidance at the above junctions. Aspiration was negative for blood  and CSF.  After confirming the position of the needle with fluoroscope in all views, 0.25 mL of Omnipaque was injected, and after seeing the proper spread a total of 1 mL of the anesthetic solution noted above was injected at each of these points.  Needles were removed intact from each of the areas.  A similar procedure was repeated to block the L2 & L3 and L4 nerves on the contralateral side.   Onset of analgesia was noted.  Vital signs remained stable throughout.      ESTIMATED BLOOD LOSS:  <5 mL  SPECIMENS:  none    COMPLICATIONS:   No complications were noted., There was no indication of vascular uptake on live injection of contrast dye. and The patient did not have any signs of postprocedure numbness nor weakness.    TOLERANCE & DISCHARGE CONDITION:    The patient tolerated the procedure well.  The patient was transported to the recovery area without difficulties.  The patient was discharged to home under the care of family in stable and satisfactory condition.    PLAN OF CARE:  1. The patient was given our standard instruction sheet.  2. We discussed that Lumbar Medial Branch Blockade is a diagnostic procedure in consideration for radiofrequency ablation if two diagnostic procedures prove to be positive for significant benefit.  If sustained relief of 6 to eight weeks is obtained, then an alternative plan could be therapeutic lumbar branch blockades.  3. The patient is asked to keep a pain log each hour for 8 hours after the procedure today.  4. The patient will  Return to clinic 1 wk  5. The patient will resume all medications as per the medication reconciliation sheet.

## 2020-02-25 ENCOUNTER — ON CAMPUS - OUTPATIENT (OUTPATIENT)
Dept: URBAN - METROPOLITAN AREA HOSPITAL 108 | Facility: HOSPITAL | Age: 60
End: 2020-02-25
Payer: MEDICAID

## 2020-02-25 DIAGNOSIS — R10.13 EPIGASTRIC PAIN: ICD-10-CM

## 2020-02-25 DIAGNOSIS — R94.5 ABNORMAL RESULTS OF LIVER FUNCTION STUDIES: ICD-10-CM

## 2020-02-25 DIAGNOSIS — R10.11 RIGHT UPPER QUADRANT PAIN: ICD-10-CM

## 2020-02-25 DIAGNOSIS — R11.0 NAUSEA: ICD-10-CM

## 2020-02-25 DIAGNOSIS — R93.3 ABNORMAL FINDINGS ON DIAGNOSTIC IMAGING OF OTHER PARTS OF DI: ICD-10-CM

## 2020-02-25 DIAGNOSIS — K80.00 CALCULUS OF GALLBLADDER WITH ACUTE CHOLECYSTITIS WITHOUT OBS: ICD-10-CM

## 2020-02-25 PROCEDURE — 99214 OFFICE O/P EST MOD 30 MIN: CPT | Performed by: INTERNAL MEDICINE

## 2020-02-26 ENCOUNTER — ON CAMPUS - OUTPATIENT (OUTPATIENT)
Dept: URBAN - METROPOLITAN AREA HOSPITAL 108 | Facility: HOSPITAL | Age: 60
End: 2020-02-26
Payer: MEDICAID

## 2020-02-26 ENCOUNTER — INPATIENT HOSPITAL (OUTPATIENT)
Dept: URBAN - METROPOLITAN AREA HOSPITAL 107 | Facility: HOSPITAL | Age: 60
End: 2020-02-26
Payer: MEDICAID

## 2020-02-26 DIAGNOSIS — R10.13 EPIGASTRIC PAIN: ICD-10-CM

## 2020-02-26 DIAGNOSIS — R94.5 ABNORMAL RESULTS OF LIVER FUNCTION STUDIES: ICD-10-CM

## 2020-02-26 DIAGNOSIS — R13.10 DYSPHAGIA, UNSPECIFIED: ICD-10-CM

## 2020-02-26 DIAGNOSIS — R11.0 NAUSEA: ICD-10-CM

## 2020-02-26 PROCEDURE — 99232 SBSQ HOSP IP/OBS MODERATE 35: CPT | Performed by: PHYSICIAN ASSISTANT

## 2020-02-26 PROCEDURE — 99214 OFFICE O/P EST MOD 30 MIN: CPT | Performed by: PHYSICIAN ASSISTANT

## 2020-02-27 ENCOUNTER — ON CAMPUS - OUTPATIENT (OUTPATIENT)
Dept: URBAN - METROPOLITAN AREA HOSPITAL 108 | Facility: HOSPITAL | Age: 60
End: 2020-02-27
Payer: MEDICAID

## 2020-02-27 DIAGNOSIS — R10.13 EPIGASTRIC PAIN: ICD-10-CM

## 2020-02-27 DIAGNOSIS — K21.0 GASTRO-ESOPHAGEAL REFLUX DISEASE WITH ESOPHAGITIS: ICD-10-CM

## 2020-02-27 DIAGNOSIS — R11.0 NAUSEA: ICD-10-CM

## 2020-02-27 DIAGNOSIS — R13.10 DYSPHAGIA, UNSPECIFIED: ICD-10-CM

## 2020-02-27 PROCEDURE — 43239 EGD BIOPSY SINGLE/MULTIPLE: CPT | Performed by: INTERNAL MEDICINE

## 2020-02-27 PROCEDURE — 43239 EGD BIOPSY SINGLE/MULTIPLE: CPT | Mod: 59 | Performed by: INTERNAL MEDICINE

## 2020-02-27 PROCEDURE — 43249 ESOPH EGD DILATION <30 MM: CPT | Performed by: INTERNAL MEDICINE

## 2020-02-27 PROCEDURE — 43450 DILATE ESOPHAGUS 1/MULT PASS: CPT | Performed by: INTERNAL MEDICINE

## 2020-03-04 ENCOUNTER — OFFICE VISIT (OUTPATIENT)
Dept: PAIN MEDICINE | Facility: CLINIC | Age: 60
End: 2020-03-04

## 2020-03-04 VITALS
DIASTOLIC BLOOD PRESSURE: 82 MMHG | RESPIRATION RATE: 16 BRPM | HEIGHT: 66 IN | TEMPERATURE: 97.7 F | BODY MASS INDEX: 37 KG/M2 | OXYGEN SATURATION: 98 % | HEART RATE: 76 BPM | SYSTOLIC BLOOD PRESSURE: 166 MMHG | WEIGHT: 230.2 LBS

## 2020-03-04 DIAGNOSIS — G89.29 OTHER CHRONIC PAIN: Primary | ICD-10-CM

## 2020-03-04 DIAGNOSIS — M43.06 LUMBAR SPONDYLOLYSIS: ICD-10-CM

## 2020-03-04 DIAGNOSIS — M25.561 CHRONIC PAIN OF BOTH KNEES: ICD-10-CM

## 2020-03-04 DIAGNOSIS — G89.29 CHRONIC PAIN OF BOTH KNEES: ICD-10-CM

## 2020-03-04 DIAGNOSIS — M25.562 CHRONIC PAIN OF BOTH KNEES: ICD-10-CM

## 2020-03-04 DIAGNOSIS — M51.36 DDD (DEGENERATIVE DISC DISEASE), LUMBAR: ICD-10-CM

## 2020-03-04 PROCEDURE — 99214 OFFICE O/P EST MOD 30 MIN: CPT | Performed by: NURSE PRACTITIONER

## 2020-03-04 NOTE — PROGRESS NOTES
"CHIEF COMPLAINT  F/U Back pain- Bilateral L2-4 Lumbar Medial Branch Blockade- patient states that she has had 60% improvement for 3 weeks.     Subjective   Damaris Williamson is a 59 y.o. female  who presents to the office for follow-up of procedure.  She completed a bilateral L2-L4 MBB   on  2-21-20 performed by Dr. COLLINS for management of LOW BACK PAIN. Patient reports 80% relief from the procedure for 3 weeks.  Noticed improvement in activity and was able to exercise.    Complains of pain in her low back. Today her pain is 5/10VAS. Describes her pain as nearly continuous aching and throbbing.  Pain increases with walking, standing, activity and illness; pain decreases with medication, rest and procedure. Continues with Tramadol 50 mg 1-2/day, Ibuprofen 200 mg 1 BID, and flexeril 5 mg 1/day PRN (avg-3-5/week). Also using Voltaren gel on her knee.  Denies any side effects from the regimen except constipation. Takes OTC Colace PRN.  The regimen helps decrease her pain moderately. ADL\"s by self. Denies any bowel or bladder changes.     Has been experiencing \"Gallbladder issues\" lately. Had IV antibiotics. Having surgery next week with Dr Cisneros at [place]. Noticed being in hospital did not help back pain.    Back Pain   This is a chronic problem. The current episode started more than 1 year ago. The problem occurs constantly. The problem has been gradually worsening (improved with RFL; worse from last office visit) since onset. The pain is present in the lumbar spine and sacro-iliac. The quality of the pain is described as aching and shooting. The pain radiates to the left thigh. The pain is at a severity of 5/10. The pain is the same all the time. The symptoms are aggravated by bending, position, sitting, standing and twisting. Stiffness is present in the morning. Associated symptoms include abdominal pain, bladder incontinence (unchanged), bowel incontinence (unchanged), a fever, leg pain, paresis, paresthesias, " tingling and weakness (right leg). Pertinent negatives include no chest pain, dysuria, headaches, numbness (right leg from right knee down to right foot), pelvic pain, perianal numbness or weight loss. Risk factors include obesity, menopause, lack of exercise, poor posture and sedentary lifestyle. She has tried analgesics, muscle relaxant and NSAIDs for the symptoms.   Joint Pain   This is a chronic problem. The current episode started more than 1 year ago. The problem occurs constantly. The problem has been gradually worsening (improved since last office visit--Voltaren gel and PT). Associated symptoms include abdominal pain, arthralgias (right knee), chills, fatigue (occ), a fever, joint swelling (right knee), nausea and weakness (right leg). Pertinent negatives include no chest pain, congestion, coughing, headaches, neck pain or numbness (right leg from right knee down to right foot).      She is s/p right TKR on 7-18-19(Dr MAST).  Past pain medications: states some strong pain medicines in the beginning     Current pain medications:   Flexeril 5 mg prn - sparingly  Ibuprofen/Naproxen- alternates  Tramadol - takes every other day.      Past therapies:  Physical Therapy: yes  Chiropractor: yes- pt states she had a family member who was a chiropractor and he would adjust her for free but he has since passed away.   Massage Therapy: no  TENS: yes  Neck or back surgery: no  Past pain management: yes     Previous Injection: Bilateral L2-L5 RFA - 1/16/19 and 2/11/19      Previous Injection: Bilateral L2-5 Lumbar Medial Branch Blockade on 8/22/18 - 5/2/2018  Effect of Injection (%): 50%     Length of Relief: 3-months     Previous Injections: yes at previous pain management, last one maybe [id number]  Effect of Injection (%): 50%  Length of Relief: couple of months    PEG Assessment   What number best describes your pain on average in the past week?5  What number best describes how, during the past week, pain has  "interfered with your enjoyment of life?4  What number best describes how, during the past week, pain has interfered with your general activity?  4    The following portions of the patient's history were reviewed and updated as appropriate: allergies, current medications, past family history, past medical history, past social history, past surgical history and problem list.    Review of Systems   Constitutional: Positive for activity change (improved), chills, fatigue (occ) and fever. Negative for weight loss.   HENT: Negative for congestion.    Eyes: Negative for visual disturbance.   Respiratory: Negative for cough, chest tightness, shortness of breath and wheezing.    Cardiovascular: Negative.  Negative for chest pain.   Gastrointestinal: Positive for abdominal pain, bowel incontinence (unchanged), constipation (occ takes colace prn), diarrhea (occ) and nausea.   Genitourinary: Positive for bladder incontinence (unchanged). Negative for difficulty urinating, dysuria and pelvic pain.   Musculoskeletal: Positive for arthralgias (right knee), back pain (occ), gait problem (cane) and joint swelling (right knee). Negative for neck pain.   Neurological: Positive for tingling, weakness (right leg) and paresthesias. Negative for dizziness (occ), light-headedness (occ), numbness (right leg from right knee down to right foot) and headaches.   Psychiatric/Behavioral: Positive for agitation and sleep disturbance (occ). Negative for suicidal ideas. The patient is nervous/anxious.    All other systems reviewed and are negative.      Vitals:    03/04/20 0801   BP: 166/82   Pulse: 76   Resp: 16   Temp: 97.7 °F (36.5 °C)   SpO2: 98%   Weight: 104 kg (230 lb 3.2 oz)   Height: 167.6 cm (66\")   PainSc:   5   PainLoc: Back     Objective   Physical Exam   Constitutional: She is oriented to person, place, and time. Vital signs are normal. She appears well-developed and well-nourished. She is cooperative.   HENT:   Head: Normocephalic " and atraumatic.   Nose: Nose normal.   Eyes: Conjunctivae and lids are normal.   Neck: Trachea normal. Neck supple.   Cardiovascular: Normal rate.   Pulmonary/Chest: Effort normal.   Abdominal: Normal appearance.   Musculoskeletal:        Right knee: She exhibits decreased range of motion. Tenderness found.        Right ankle: She exhibits decreased range of motion.        Lumbar back: She exhibits decreased range of motion, tenderness and bony tenderness (moderate L2-L4 facet tenderness; + loading manuever).   mild tenderness of right SI joint, minimal of left     Neurological: She is alert and oriented to person, place, and time. Gait (cane) abnormal.   Skin: Skin is warm, dry and intact.   Psychiatric: She has a normal mood and affect. Her speech is normal and behavior is normal. Judgment and thought content normal. Cognition and memory are normal.   Nursing note and vitals reviewed.    Assessment/Plan   Damaris was seen today for back pain.    Diagnoses and all orders for this visit:    Other chronic pain    DDD (degenerative disc disease), lumbar    Lumbar spondylolysis  -     Case Request    Chronic pain of both knees      --- The urine drug screen confirmation from 11-7-19 has been reviewed and the result is APPROPRIATE based on patient history and ANT report  --- Bilateral L2-L4 RFL. No blood thinners. Reviewed the procedure at length with the patient.  Included in the review was expectations, complications, risk and benefits.The procedure was described in detail and the risks, benefits and alternatives were discussed with the patient (including but not limited to: bleeding, infection, nerve damage, worsening of pain, inability to perform injection, paralysis, seizures, and death) who agreed to proceed.  Discussed the potential for sedation if warranted/wanted.  The procedure will plan to be performed at [facility] with fluoroscopic guidance(unless ultrasound is indicated). Questions  "were answered and in a way the patient could understand.  Patient verbalized understanding and wishes to proceed.  This intervention will be ordered.  Discussed with patient that all procedures are part of a multimodal plan of care and include either formal PT or a home exercise program.  Patient has no evidence of coagulopathy or current infection.  --- Continue with regimen. Does not need refill. OK to receive post-op pain medication after surgery. Patient appears stable with current regimen. No adverse effects. Regarding continuation of opioids, there is no evidence of aberrant behavior or any red flags.  The patient continues with appropriate response to opioid therapy. ADL's remain intact by self.   --- Follow-up after procedure or sooner if needed.    -------  Education about Medial Branch Blockade and RF Therapy:    This medial branch blockade (MBB) suggested is intended for diagnostic purposes, with the intent of offering the patient Radiofrequency thermal rhizotomy (RF) if the MBB is diagnostically effective.  The diagnostic blockade is necessary to determine the likelihood that RF therapy could be efficacious in providing long term relief to the patient.    Medial branches are sensory nerve branches that connect to a facet joint and transmit sensations & pain signals from that joint.  Facet is a term for the type of joints found in the spine.  Medial branches are the nerves that go to a facet, and therefore are also sometimes called \"facet joint nerves\" (FJNs).      In a medial branch blockade procedure, xray fluoroscopy is used to verify the locations of the outside of the joint lines which are being targeted.  Under xray guidance, needles are placed to these areas.  Contrast dye is injected to confirm proper placement, with dye flowing over the joint area, and to ensure that the dye does not flow into unintended areas such as a vein.  When this is confirmed, local anesthetic is injected to block the " medial branch at that joint level.      If MBBs are diagnostically successful in blocking pain, then the patient is most likely a great candidate for Radiofrequency of those facet joint nerves.  In the RF procedure, needles are placed to the joint lines in the same fashion, and after testing, the needle tips are heated to thermally treat the nerves, blocking the nerves by in essence damaging the nerves with the heat treatment.       Medically, a successful RF procedure should provide a patient with 50% pain relief or more for at least 6 months.  Clinical experience suggests that successful patients receive relief more in the range of 12 months on average.  We also discussed that a fortunate minority of patients receive therapeutic success from the MBB, and may not require RF ablation.  If a patient receives more than 8 weeks of relief from MBB, then occasional repeat MBB for therapeutic purposes is a very reasonable alternative therapy.  This course of therapy is consistent with our LCDs according to our CMS  in the area, and therefore other insurance providers should follow accordingly.  We will monitor our patients to screen for these therapeutic responders and will offer RF therapy only when necessary.        We discussed that MBB & RF are not without risks.  Guidelines regarding anticoagulant use & neuraxial procedures will be respected.  Patients that are ill or otherwise may be at risk for sepsis will not have their spines accessed by neuraxial injections of any type.  This patient will not be offered these therapies if there is an increased risk.   We discussed that there is a risk of postprocedural pain and also a risk of worsening of clinical picture with these procedures as with any neuraxial procedure.    -------           ANT REPORT    As part of the patient's treatment plan, I am prescribing controlled substances. The patient has been made aware of appropriate use of such medications,  including potential risk of somnolence, limited ability to drive and/or work safely, and the potential for dependence or overdose. It has also bee made clear that these medications are for use by this patient only, without concomitant use of alcohol or other substances unless prescribed.     Patient has completed prescribing agreement detailing terms of continued prescribing of controlled substances, including monitoring ANT reports, urine drug screening, and pill counts if necessary. The patient is aware that inappropriate use will results in cessation of prescribing such medications.    ANT report has been reviewed and scanned into the patient's chart.    As the clinician, I personally reviewed the ANT from 3-3-20 while the patient was in the office today.    History and physical exam exhibit continued safe and appropriate use of controlled substances.       EMR Dragon/Transcription disclaimer:   Much of this encounter note is an electronic transcription/translation of spoken language to printed text. The electronic translation of spoken language may permit erroneous, or at times, nonsensical words or phrases to be inadvertently transcribed; Although I have reviewed the note for such errors, some may still exist.

## 2020-03-12 ENCOUNTER — ON CAMPUS - OUTPATIENT (OUTPATIENT)
Dept: URBAN - METROPOLITAN AREA HOSPITAL 108 | Facility: HOSPITAL | Age: 60
End: 2020-03-12
Payer: MEDICAID

## 2020-03-12 DIAGNOSIS — R11.0 NAUSEA: ICD-10-CM

## 2020-03-12 DIAGNOSIS — Z90.49 ACQUIRED ABSENCE OF OTHER SPECIFIED PARTS OF DIGESTIVE TRACT: ICD-10-CM

## 2020-03-12 DIAGNOSIS — R10.13 EPIGASTRIC PAIN: ICD-10-CM

## 2020-03-12 DIAGNOSIS — K80.50 CALCULUS OF BILE DUCT WITHOUT CHOLANGITIS OR CHOLECYSTITIS W: ICD-10-CM

## 2020-03-12 PROCEDURE — 99213 OFFICE O/P EST LOW 20 MIN: CPT | Performed by: INTERNAL MEDICINE

## 2020-03-13 ENCOUNTER — ON CAMPUS - OUTPATIENT (OUTPATIENT)
Dept: URBAN - METROPOLITAN AREA HOSPITAL 108 | Facility: HOSPITAL | Age: 60
End: 2020-03-13
Payer: MEDICAID

## 2020-03-13 DIAGNOSIS — R74.8 ABNORMAL LEVELS OF OTHER SERUM ENZYMES: ICD-10-CM

## 2020-03-13 DIAGNOSIS — R10.9 UNSPECIFIED ABDOMINAL PAIN: ICD-10-CM

## 2020-03-13 DIAGNOSIS — K80.50 CALCULUS OF BILE DUCT WITHOUT CHOLANGITIS OR CHOLECYSTITIS W: ICD-10-CM

## 2020-03-13 DIAGNOSIS — K86.89 OTHER SPECIFIED DISEASES OF PANCREAS: ICD-10-CM

## 2020-03-13 PROCEDURE — 43264 ERCP REMOVE DUCT CALCULI: CPT

## 2020-03-13 PROCEDURE — 43262 ENDO CHOLANGIOPANCREATOGRAPH: CPT | Mod: 59

## 2020-03-13 PROCEDURE — 43274 ERCP DUCT STENT PLACEMENT: CPT

## 2020-05-01 ENCOUNTER — OFFICE VISIT (OUTPATIENT)
Dept: PAIN MEDICINE | Facility: CLINIC | Age: 60
End: 2020-05-01

## 2020-05-01 DIAGNOSIS — M25.562 CHRONIC PAIN OF BOTH KNEES: ICD-10-CM

## 2020-05-01 DIAGNOSIS — M51.36 DDD (DEGENERATIVE DISC DISEASE), LUMBAR: ICD-10-CM

## 2020-05-01 DIAGNOSIS — M25.561 CHRONIC PAIN OF BOTH KNEES: ICD-10-CM

## 2020-05-01 DIAGNOSIS — G89.29 CHRONIC PAIN OF BOTH KNEES: ICD-10-CM

## 2020-05-01 DIAGNOSIS — G89.29 OTHER CHRONIC PAIN: Primary | ICD-10-CM

## 2020-05-01 DIAGNOSIS — M43.06 LUMBAR SPONDYLOLYSIS: ICD-10-CM

## 2020-05-01 PROCEDURE — 99442 PR PHYS/QHP TELEPHONE EVALUATION 11-20 MIN: CPT | Performed by: NURSE PRACTITIONER

## 2020-05-01 RX ORDER — TRAMADOL HYDROCHLORIDE 50 MG/1
50 TABLET ORAL 2 TIMES DAILY PRN
Qty: 60 TABLET | Refills: 0 | Status: SHIPPED | OUTPATIENT
Start: 2020-05-01 | End: 2020-07-01 | Stop reason: SDUPTHER

## 2020-05-01 NOTE — PROGRESS NOTES
"TELEPHONE VISIT    You have chosen to receive care through a telephone visit. Do you consent to use a telephone visit for your medical care today? Yes    CHIEF COMPLAINT  F/U back pain- patient states that her pain has worsened since her last visit.     Subjective   Damaris Williamson is a 59 y.o. female  who presents for a telephonic follow-up.She has a history of chronic back and knee pain. Reports her pain is worse since last office visit. However, she had gallbladder removed and had complications. A gallstone got out and she required a stent. Has still been feeling weak.  Prior to surgery, she was walking over a mile per day. Has not been able to since surgery.    At last office visit, she was ordered to have lumbar RFL but this has not yet been scheduled due to coronavirus pandemic.     Complains of pain in her low back and knee. Her primary complaint is her low back pain. Today her pain is 7/10VAS. Describes her pain as nearly continuous aching with intermittent sharp pain. Continues with Tramadol 50 mg 1/day, Ibuprofen 200 mg 1 BID, and flexeril 5 mg 1/day PRN . Also using Voltaren gel on her knee.  Denies any side effects from the regimen except constipation. Takes OTC Colace PRN.  The regimen helps decrease her pain minimally. \"it's just not cutting it right now.\"  ADL\"s by self. Denies any bowel or bladder changes.   Just received new splint for leg/foot.  Back Pain   This is a chronic problem. The current episode started more than 1 year ago. The problem occurs constantly. The problem has been gradually worsening (worse from last office visit) since onset. The pain is present in the lumbar spine and sacro-iliac. The quality of the pain is described as aching and shooting. The pain radiates to the left thigh. The pain is at a severity of 7/10. The pain is the same all the time. The symptoms are aggravated by bending, position, sitting, standing and twisting. Stiffness is present in the morning. Associated " symptoms include bladder incontinence (unchanged), bowel incontinence (unchanged), headaches, leg pain, paresis, paresthesias, tingling and weakness (right leg). Pertinent negatives include no abdominal pain, chest pain, dysuria, fever, numbness (right leg from right knee down to right foot), pelvic pain, perianal numbness or weight loss. Risk factors include obesity, menopause, lack of exercise, poor posture and sedentary lifestyle. She has tried analgesics, muscle relaxant and NSAIDs for the symptoms.   Joint Pain   This is a chronic problem. The current episode started more than 1 year ago. The problem occurs constantly. The problem has been gradually worsening (unchanged since last office visit--Voltaren gel and PT). Associated symptoms include arthralgias (right knee), fatigue (occ), headaches and weakness (right leg). Pertinent negatives include no abdominal pain, chest pain, chills, congestion, coughing, fever, nausea, neck pain or numbness (right leg from right knee down to right foot). Joint swelling: right knee.      She is s/p right TKR on 7-18-19(Dr MAST).  Past pain medications: states some strong pain medicines in the beginning     Current pain medications:   Flexeril 5 mg prn - sparingly  Ibuprofen/Naproxen- alternates  Tramadol - takes almost daily      Past therapies:  Physical Therapy: yes  Chiropractor: yes- pt states she had a family member who was a chiropractor and he would adjust her for free but he has since passed away.   Massage Therapy: no  TENS: yes  Neck or back surgery: no  Past pain management: yes     Previous Injection: Bilateral L2-L5 RFA - 1/16/19 and 2/11/19      Previous Injection: Bilateral L2-5 Lumbar Medial Branch Blockade on 8/22/18 - 5/2/2018  Effect of Injection (%): 50%     Length of Relief: 3-months     Previous Injections: yes at previous pain management, last one maybe 1602-2046  Effect of Injection (%): 50%  Length of Relief: couple of months    PEG Assessment   What  number best describes your pain on average in the past week?7  What number best describes how, during the past week, pain has interfered with your enjoyment of life?8  What number best describes how, during the past week, pain has interfered with your general activity?  8    The following portions of the patient's history were reviewed and updated as appropriate: allergies, current medications, past family history, past medical history, past social history, past surgical history and problem list.    Review of Systems   Constitutional: Positive for activity change (dec) and fatigue (occ). Negative for chills, fever and weight loss.   HENT: Negative for congestion.    Eyes: Negative for visual disturbance.   Respiratory: Negative for cough, chest tightness, shortness of breath and wheezing.    Cardiovascular: Negative.  Negative for chest pain.   Gastrointestinal: Positive for bowel incontinence (unchanged) and constipation (occ takes colace prn). Negative for abdominal pain, diarrhea (occ) and nausea.   Genitourinary: Positive for bladder incontinence (unchanged). Negative for difficulty urinating, dysuria and pelvic pain.   Musculoskeletal: Positive for arthralgias (right knee), back pain (occ) and gait problem (cane). Negative for neck pain. Joint swelling: right knee.   Neurological: Positive for tingling, weakness (right leg), headaches and paresthesias. Negative for dizziness (occ), light-headedness (occ) and numbness (right leg from right knee down to right foot).   Psychiatric/Behavioral: Positive for sleep disturbance (occ). Negative for agitation and suicidal ideas. The patient is nervous/anxious.    All other systems reviewed and are negative.    Vitals:    05/01/20 1033   PainSc:   7   PainLoc: Back       Objective   Physical Exam  As this is a telephone check-in, the ability to perform a routine physical exam is extremely limited. The patient seems alert and is oriented appropriately.   On this phone call  there is not any evidence of respiratory distress.   The patient seems of normal mood.   The remainder of a routine physical exam is deferred.      Assessment/Plan   Damaris was seen today for back pain.    Diagnoses and all orders for this visit:    Other chronic pain    DDD (degenerative disc disease), lumbar    Lumbar spondylolysis    Chronic pain of both knees    Other orders  -     traMADol (ULTRAM) 50 MG tablet; Take 1 tablet by mouth 2 (Two) Times a Day As Needed for Moderate Pain .    ----------------    Our practice is offering alternative &/or electronic methods to continue to follow our patients while at the same time further the efforts toward social distancing, in accordance with our organizational policies, professional societies' guidance, and gubernatorial mandates.  I support the Healthy at Home campaign and in this visit I have counseled the patient on our needs to limit in-person office visits and physical encounters with medical facilities whenever possible.  I have also educated the patient on the medical necessities of maintaining social distancing while we continue to function during this crisis period.      The patient was counseled on the need to consider telehealth options. The patient had obstacles which preclude consideration for a Video Visit. As a Video Visit was not practical, the patient was offered the option for a Telephone Check-In. The patient agreed to a Telephone Check-In. The patient was counseled on the need for a check-in visit. The patient was educated about our efforts to comply with monitoring standards when prescribing potent medications.    TIME:  Total Time:  17 minutes. Topics discussed are outlined in the Assessment/Plan section of the note.      ----------------    --- The urine drug screen confirmation from 11-7-19 has been reviewed and the result is APPROPRIATE based on patient history and ANT report  --- Refill Tramadol but increase to BID dosing. Patient appears  stable with current regimen. No adverse effects. Regarding continuation of opioids, there is no evidence of aberrant behavior or any red flags.  The patient continues with appropriate response to opioid therapy. ADL's remain intact by self.   --- Proceed with RFL with able to schedule.  --- Follow-up 2 months or sooner if needed.       ANT REPORT    As part of the patient's treatment plan, I am prescribing controlled substances. The patient has been made aware of appropriate use of such medications, including potential risk of somnolence, limited ability to drive and/or work safely, and the potential for dependence or overdose. It has also been made clear that these medications are for use by this patient only, without concomitant use of alcohol or other substances unless prescribed.     Patient has completed prescribing agreement detailing terms of continued prescribing of controlled substances, including monitoring ANT reports, urine drug screening, and pill counts if necessary. The patient is aware that inappropriate use will results in cessation of prescribing such medications.    ANT report has been reviewed and scanned into the patient's chart.    As the clinician, I personally reviewed the ANT from 4-29-20 while the patient was on the telephonic visit today.    History and physical exam exhibit continued safe and appropriate use of controlled substances.    -------    EMR Dragon/Transcription disclaimer:   Much of this encounter note is an electronic transcription/translation of spoken language to printed text. The electronic translation of spoken language may permit erroneous, or at times, nonsensical words or phrases to be inadvertently transcribed; Although I have reviewed the note for such errors, some may still exist.

## 2020-06-05 ENCOUNTER — LAB REQUISITION (OUTPATIENT)
Dept: LAB | Facility: HOSPITAL | Age: 60
End: 2020-06-05

## 2020-06-05 DIAGNOSIS — Z00.00 ENCOUNTER FOR GENERAL ADULT MEDICAL EXAMINATION WITHOUT ABNORMAL FINDINGS: ICD-10-CM

## 2020-06-05 PROCEDURE — U0004 COV-19 TEST NON-CDC HGH THRU: HCPCS | Performed by: ANESTHESIOLOGY

## 2020-06-06 LAB
REF LAB TEST METHOD: NORMAL
SARS-COV-2 RNA RESP QL NAA+PROBE: NOT DETECTED

## 2020-06-08 ENCOUNTER — OUTSIDE FACILITY SERVICE (OUTPATIENT)
Dept: PAIN MEDICINE | Facility: CLINIC | Age: 60
End: 2020-06-08

## 2020-06-08 ENCOUNTER — DOCUMENTATION (OUTPATIENT)
Dept: PAIN MEDICINE | Facility: CLINIC | Age: 60
End: 2020-06-08

## 2020-06-08 PROCEDURE — 64636 DESTROY L/S FACET JNT ADDL: CPT | Performed by: ANESTHESIOLOGY

## 2020-06-08 PROCEDURE — 99152 MOD SED SAME PHYS/QHP 5/>YRS: CPT | Performed by: ANESTHESIOLOGY

## 2020-06-08 PROCEDURE — 64635 DESTROY LUMB/SAC FACET JNT: CPT | Performed by: ANESTHESIOLOGY

## 2020-06-08 NOTE — PROGRESS NOTES
Bilateral L2-4 Lumbar Medial Branch RADIOFREQUENCY  [facility]      PREOPERATIVE DIAGNOSIS:  Lumbar spondylosis without myelopathy    POSTOPERATIVE DIAGNOSIS:  Lumbar spondylosis without myelopathy    PROCEDURE:   Diagnostic Bilateral Lumbar Medial Branch Nerve thermal radiofrequency lesioning, with fluoroscopy:  L2, L3, L4 nerves (at the L3, L4, L5 transverse processes) to thermally treat the innervation to facet joints L3-4, L4-5.  1. 43438-36 -- Bilateral L/S facet neuro destr., 1st Level  2. 18092-50 -- Bilateral L/S facet neuro destr., 2nd  Level       PRE-PROCEDURE DISCUSSION WITH PATIENT:    Risks and complications were discussed with the patient prior to starting the procedure and informed consent was obtained.      SURGEON:  Rodrigo Pelayo MD    REASON FOR PROCEDURE:    Previous RF+.  Previous diagnostic positivity of branch blockades at these same levels is noted.      SEDATION:  Fentanyl 50 mcg IV  TIME OF PROCEDURE:   The intraoperative procedure time after administration of the sedative was 17 minutes.       ANESTHETIC:  Lidocaine 2%  STEROID:  NONE      DESCRIPTON OF PROCEDURE:  After obtaining informed consent, IV access was obtained in the preoperative area.   The patient was taken to the operating room.  The patient was placed in the prone position with a pillow under the abdomen. All pressure points were well padded.  EKG, blood pressure, and pulse oximeter were monitored.  The patient was monitored and sedated by the RN under my direction. The lumbosacral area was prepped with Chloraprep and draped in a sterile fashion.     Under fluoroscopic guidance the transverse processes of the L3, L4, and L5 vertebrae at the junctions of the superior articular processes were identified on the right.     Skin and subcutaneous tissue were anesthetized with 1ml of 1% lidocaine above each of these points. Then, radiofrequency probe needles were advanced in this fluoro view to the above  junctions.  Aspiration was negative for blood and CSF.  After confirming the position of the needle with fluoroscope in all views, testing was initiated.  First, sensory testing was started on each needle a 1V and 50Hz and slowly decreased until painful pressure stimulation diminished at 0.5V.  Next, motor testing was confirmed to be negative at 3V and 2Hz for any radicular stimulation.  Then 1mL of the local anesthetic solution was instilled in each needle.  Two minutes elapsed, and during this time a lateral fluoroscopic view was confirmed again to ensure the needles had not advanced nor retracted.  Then, Radiofrequency Lesioning was initiated for 2.5 minutes at 85 degrees Celsius.  Needles were removed intact from each of the areas.     A similar procedure was repeated to address the L2, L3, and L4 nerves on the contralateral side.   Onset of analgesia was noted.  Vital signs remained stable throughout.      ESTIMATED BLOOD LOSS:  <5 mL  SPECIMENS:  none    COMPLICATIONS:   No complications were noted. and The patient did not have any signs of postprocedure numbness nor weakness.    TOLERANCE & DISCHARGE CONDITION:    The patient tolerated the procedure well.  The patient was transported to the recovery area without difficulties.  The patient was discharged to home under the care of family in stable and satisfactory condition.    PLAN OF CARE:  1. The patient was given our standard instruction sheet.  2. The patient will  Return to clinic 3 wks.  3. The patient will resume all medications as per the medication reconciliation sheet.

## 2020-07-01 ENCOUNTER — RESULTS ENCOUNTER (OUTPATIENT)
Dept: PAIN MEDICINE | Facility: CLINIC | Age: 60
End: 2020-07-01

## 2020-07-01 ENCOUNTER — OFFICE VISIT (OUTPATIENT)
Dept: PAIN MEDICINE | Facility: CLINIC | Age: 60
End: 2020-07-01

## 2020-07-01 VITALS
HEIGHT: 66 IN | HEART RATE: 81 BPM | BODY MASS INDEX: 38.47 KG/M2 | WEIGHT: 239.4 LBS | RESPIRATION RATE: 20 BRPM | SYSTOLIC BLOOD PRESSURE: 140 MMHG | OXYGEN SATURATION: 96 % | DIASTOLIC BLOOD PRESSURE: 80 MMHG | TEMPERATURE: 98.6 F

## 2020-07-01 DIAGNOSIS — Z79.899 ENCOUNTER FOR LONG-TERM (CURRENT) USE OF HIGH-RISK MEDICATION: ICD-10-CM

## 2020-07-01 DIAGNOSIS — M25.562 CHRONIC PAIN OF BOTH KNEES: Primary | ICD-10-CM

## 2020-07-01 DIAGNOSIS — G89.29 CHRONIC PAIN OF BOTH KNEES: Primary | ICD-10-CM

## 2020-07-01 DIAGNOSIS — G89.29 OTHER CHRONIC PAIN: ICD-10-CM

## 2020-07-01 DIAGNOSIS — M43.06 LUMBAR SPONDYLOLYSIS: ICD-10-CM

## 2020-07-01 DIAGNOSIS — M25.561 CHRONIC PAIN OF BOTH KNEES: ICD-10-CM

## 2020-07-01 DIAGNOSIS — M25.561 CHRONIC PAIN OF BOTH KNEES: Primary | ICD-10-CM

## 2020-07-01 DIAGNOSIS — G89.29 CHRONIC PAIN OF BOTH KNEES: ICD-10-CM

## 2020-07-01 DIAGNOSIS — M51.36 DDD (DEGENERATIVE DISC DISEASE), LUMBAR: ICD-10-CM

## 2020-07-01 DIAGNOSIS — M25.562 CHRONIC PAIN OF BOTH KNEES: ICD-10-CM

## 2020-07-01 PROCEDURE — 99214 OFFICE O/P EST MOD 30 MIN: CPT | Performed by: NURSE PRACTITIONER

## 2020-07-01 PROCEDURE — 80305 DRUG TEST PRSMV DIR OPT OBS: CPT | Performed by: NURSE PRACTITIONER

## 2020-07-01 RX ORDER — CEPHALEXIN 500 MG/1
CAPSULE ORAL
COMMUNITY
Start: 2020-06-29 | End: 2021-03-23

## 2020-07-01 RX ORDER — CYCLOBENZAPRINE HCL 5 MG
5 TABLET ORAL 2 TIMES DAILY PRN
Qty: 60 TABLET | Refills: 5 | Status: SHIPPED | OUTPATIENT
Start: 2020-07-01 | End: 2020-09-01 | Stop reason: SDUPTHER

## 2020-07-01 RX ORDER — OXYCODONE HYDROCHLORIDE AND ACETAMINOPHEN 5; 325 MG/1; MG/1
1 TABLET ORAL
COMMUNITY
Start: 2020-03-13 | End: 2020-09-01

## 2020-07-01 RX ORDER — CHLORAL HYDRATE 500 MG
CAPSULE ORAL
COMMUNITY
Start: 2020-05-06 | End: 2021-03-23

## 2020-07-01 RX ORDER — MAGNESIUM 200 MG
1 TABLET ORAL DAILY
COMMUNITY
End: 2021-03-23

## 2020-07-01 RX ORDER — DOCUSATE SODIUM 100 MG/1
100 CAPSULE, LIQUID FILLED ORAL
COMMUNITY
Start: 2020-03-13 | End: 2020-09-01 | Stop reason: SDUPTHER

## 2020-07-01 RX ORDER — IBUPROFEN 200 MG
TABLET ORAL
COMMUNITY
Start: 2019-05-23 | End: 2021-03-23

## 2020-07-01 RX ORDER — MELATONIN 1 MG/ML
1 LIQUID (ML) ORAL DAILY
COMMUNITY

## 2020-07-01 RX ORDER — CYCLOBENZAPRINE HCL 5 MG
5 TABLET ORAL
COMMUNITY
Start: 2018-04-17 | End: 2021-03-23 | Stop reason: SDUPTHER

## 2020-07-01 RX ORDER — TRAMADOL HYDROCHLORIDE 50 MG/1
50 TABLET ORAL DAILY PRN
Qty: 30 TABLET | Refills: 0 | Status: SHIPPED | OUTPATIENT
Start: 2020-07-01 | End: 2020-09-01 | Stop reason: SDUPTHER

## 2020-07-01 NOTE — PROGRESS NOTES
"CHIEF COMPLAINT  F/u back pain. Pt had Bilateral L2-4 Lumbar Medial Branch RADIOFREQUENCY on 6/8/20, sts receiving 90% relief so far from procedure. Pt sts she is feeling a new pain radiating down left leg woke up with it this morning.    Subjective   Damaris Williamson is a 60 y.o. female  who presents for follow-up.  She has a history of back pain.  She completed a Bilateral L2-L4 Lumbar medial branch radiofrequency ablation on 6/8/2020 performed by Dr. Pelayo.  She reports 85-90% ONGOING relief from this procedure.  She has a new complaint of pain in her left sacroiliac radiating to her left buttock.  She states she woke up with this pain and feels it is because she slept \"wrong\".  We discussed the possibility of performing a Left SI joint injection, she would like to wait on this since steroids tend to cause her blood glucose and blood pressure to elevate.     Today her pain is 6/10VAS in severity. She continues with Tramadol 0-1/day, and flexeril 5 mg 0-1/day PRN.  Her tramadol was increased to twice a day at her last office visit, she has since been able to de-escalate this since her RFL has provided pain relief. She states the tramadol \"takes the edge off\".  She complains of constipation, she manages this with metamucil and OTC colace as needed. She denies any other side effects including somnolence. ADLs by self.     Back Pain   This is a chronic problem. The current episode started more than 1 year ago. The problem occurs constantly. The problem has been rapidly improving since onset. The pain is present in the lumbar spine and sacro-iliac. The quality of the pain is described as aching and shooting. The pain radiates to the left thigh. The pain is at a severity of 6/10. The pain is the same all the time. The symptoms are aggravated by bending, position, sitting, standing and twisting. Stiffness is present in the morning. Associated symptoms include bladder incontinence (unchanged), bowel incontinence " (unchanged), leg pain, numbness (right leg from right knee down to right foot), paresis, paresthesias, tingling and weakness (left knee). Pertinent negatives include no abdominal pain, chest pain, dysuria, fever, headaches, pelvic pain, perianal numbness or weight loss. Risk factors include obesity, menopause, lack of exercise, poor posture and sedentary lifestyle. She has tried analgesics, muscle relaxant and NSAIDs (RFL) for the symptoms. The treatment provided significant relief.   Joint Pain   This is a chronic problem. The current episode started more than 1 year ago. The problem occurs constantly. The problem has been gradually worsening (unchanged since last office visit--Voltaren gel and PT). Associated symptoms include arthralgias (right knee), numbness (right leg from right knee down to right foot) and weakness (left knee). Pertinent negatives include no abdominal pain, chest pain, chills, congestion, coughing, fatigue, fever, headaches, nausea or neck pain. Joint swelling: right knee.      PEG Assessment   What number best describes your pain on average in the past week?6  What number best describes how, during the past week, pain has interfered with your enjoyment of life?6  What number best describes how, during the past week, pain has interfered with your general activity?  6    The following portions of the patient's history were reviewed and updated as appropriate: allergies, current medications, past family history, past medical history, past social history, past surgical history and problem list.    Review of Systems   Constitutional: Positive for activity change (improved). Negative for chills, fatigue, fever and weight loss.   HENT: Negative for congestion.    Eyes: Negative for visual disturbance.   Respiratory: Negative for cough, chest tightness, shortness of breath and wheezing.    Cardiovascular: Negative.  Negative for chest pain.   Gastrointestinal: Positive for bowel incontinence  "(unchanged) and constipation (occ ). Negative for abdominal pain, diarrhea (occ) and nausea.   Genitourinary: Positive for bladder incontinence (unchanged). Negative for difficulty urinating, dysuria and pelvic pain.   Musculoskeletal: Positive for arthralgias (right knee), back pain (occ) and gait problem (cane). Negative for neck pain. Joint swelling: right knee.   Allergic/Immunologic: Negative for immunocompromised state.   Neurological: Positive for tingling, weakness (left knee), numbness (right leg from right knee down to right foot) and paresthesias. Negative for dizziness (occ), light-headedness (occ) and headaches.   Psychiatric/Behavioral: Positive for sleep disturbance (occ). Negative for agitation and suicidal ideas. The patient is nervous/anxious.    All other systems reviewed and are negative.    Vitals:    07/01/20 1353   BP: 140/80   Pulse: 81   Resp: 20   Temp: 98.6 °F (37 °C)   SpO2: 96%   Weight: 109 kg (239 lb 6.4 oz)   Height: 167.6 cm (66\")   PainSc:   6   PainLoc: Back     Objective   Physical Exam   Constitutional: She is oriented to person, place, and time. She appears well-developed and well-nourished.   HENT:   Head: Normocephalic and atraumatic.   Eyes: Conjunctivae and EOM are normal.   Neck: Normal range of motion.   Cardiovascular: Normal rate.   Pulmonary/Chest: Effort normal.   Musculoskeletal:        Right knee: She exhibits decreased range of motion.        Right ankle: She exhibits decreased range of motion (in brace).        Lumbar back: She exhibits decreased range of motion and tenderness.   POS Left SI compression   Neurological: She is alert and oriented to person, place, and time. Gait (cane) abnormal.   Skin: Skin is warm and dry.   Psychiatric: She has a normal mood and affect. Her behavior is normal. Judgment and thought content normal.   Nursing note and vitals reviewed.    Assessment/Plan   Damaris was seen today for back pain.    Diagnoses and all orders for this " visit:    Chronic pain of both knees  -     Urine Drug Screen Confirmation - Urine, Clean Catch; Future  -     POC Urine Drug Screen, Triage    Lumbar spondylolysis  -     Urine Drug Screen Confirmation - Urine, Clean Catch; Future  -     POC Urine Drug Screen, Triage    DDD (degenerative disc disease), lumbar  -     Urine Drug Screen Confirmation - Urine, Clean Catch; Future  -     POC Urine Drug Screen, Triage    Other chronic pain  -     Urine Drug Screen Confirmation - Urine, Clean Catch; Future  -     POC Urine Drug Screen, Triage    Encounter for long-term (current) use of high-risk medication    Other orders  -     cyclobenzaprine (FLEXERIL) 5 MG tablet; Take 1 tablet by mouth 2 (Two) Times a Day As Needed for Muscle Spasms.      --- Routine UDS in office today as part of monitoring requirements for controlled substances.  The specimen was viewed and the immunoassay result reviewed and is NEG.  This specimen will be sent to [facility] for confirmation.     --- Refill Tramadol. Decrease sig back to daily PRN for painPatient appears stable with current regimen. No adverse effects. Regarding continuation of opioids, there is no evidence of aberrant behavior or any red flags.  The patient continues with appropriate response to opioid therapy. ADL's remain intact by self.   --- Follow-up 2 months or sooner if needed     ANT REPORT    As part of the patient's treatment plan, I am prescribing controlled substances. The patient has been made aware of appropriate use of such medications, including potential risk of somnolence, limited ability to drive and/or work safely, and the potential for dependence or overdose. It has also bee made clear that these medications are for use by this patient only, without concomitant use of alcohol or other substances unless prescribed.     Patient has completed prescribing agreement detailing terms of continued prescribing of controlled substances, including monitoring  ANT reports, urine drug screening, and pill counts if necessary. The patient is aware that inappropriate use will results in cessation of prescribing such medications.    ANT report has been reviewed and scanned into the patient's chart.    As the clinician, I personally reviewed the ANT from 6/30/2020.    History and physical exam exhibit continued safe and appropriate use of controlled substances.    EMR Dragon/Transcription disclaimer:   Much of this encounter note is an electronic transcription/translation of spoken language to printed text. The electronic translation of spoken language may permit erroneous, or at times, nonsensical words or phrases to be inadvertently transcribed; Although I have reviewed the note for such errors, some may still exist.

## 2020-07-07 VITALS — HEIGHT: 65 IN | WEIGHT: 109 LBS

## 2020-07-08 ENCOUNTER — OFFICE (OUTPATIENT)
Dept: URBAN - METROPOLITAN AREA CLINIC 75 | Facility: CLINIC | Age: 60
End: 2020-07-08
Payer: MEDICARE

## 2020-07-08 DIAGNOSIS — K22.2 ESOPHAGEAL OBSTRUCTION: ICD-10-CM

## 2020-07-08 DIAGNOSIS — K80.51 CALCULUS OF BILE DUCT WITHOUT CHOLANGITIS OR CHOLECYSTITIS W: ICD-10-CM

## 2020-07-08 DIAGNOSIS — R13.10 DYSPHAGIA, UNSPECIFIED: ICD-10-CM

## 2020-07-08 DIAGNOSIS — Z12.11 ENCOUNTER FOR SCREENING FOR MALIGNANT NEOPLASM OF COLON: ICD-10-CM

## 2020-07-08 PROCEDURE — 99213 OFFICE O/P EST LOW 20 MIN: CPT | Mod: 95 | Performed by: INTERNAL MEDICINE

## 2020-09-01 ENCOUNTER — LAB (OUTPATIENT)
Dept: LAB | Facility: HOSPITAL | Age: 60
End: 2020-09-01

## 2020-09-01 ENCOUNTER — OFFICE VISIT (OUTPATIENT)
Dept: PAIN MEDICINE | Facility: CLINIC | Age: 60
End: 2020-09-01

## 2020-09-01 VITALS
HEART RATE: 90 BPM | DIASTOLIC BLOOD PRESSURE: 90 MMHG | HEIGHT: 66 IN | SYSTOLIC BLOOD PRESSURE: 150 MMHG | BODY MASS INDEX: 39.73 KG/M2 | TEMPERATURE: 97.5 F | WEIGHT: 247.2 LBS | OXYGEN SATURATION: 99 % | RESPIRATION RATE: 20 BRPM

## 2020-09-01 DIAGNOSIS — M25.562 CHRONIC PAIN OF BOTH KNEES: ICD-10-CM

## 2020-09-01 DIAGNOSIS — M51.36 DDD (DEGENERATIVE DISC DISEASE), LUMBAR: ICD-10-CM

## 2020-09-01 DIAGNOSIS — M25.50 ARTHRALGIA, UNSPECIFIED JOINT: ICD-10-CM

## 2020-09-01 DIAGNOSIS — G89.29 OTHER CHRONIC PAIN: Primary | ICD-10-CM

## 2020-09-01 DIAGNOSIS — M25.561 CHRONIC PAIN OF BOTH KNEES: ICD-10-CM

## 2020-09-01 DIAGNOSIS — Z79.899 ENCOUNTER FOR LONG-TERM (CURRENT) USE OF HIGH-RISK MEDICATION: ICD-10-CM

## 2020-09-01 DIAGNOSIS — M54.16 LUMBAR RADICULAR PAIN: ICD-10-CM

## 2020-09-01 DIAGNOSIS — M43.06 LUMBAR SPONDYLOLYSIS: ICD-10-CM

## 2020-09-01 DIAGNOSIS — G89.29 CHRONIC PAIN OF BOTH KNEES: ICD-10-CM

## 2020-09-01 LAB
CHROMATIN AB SERPL-ACNC: <10 IU/ML (ref 0–14)
CRP SERPL-MCNC: 0.3 MG/DL (ref 0–0.5)
ERYTHROCYTE [SEDIMENTATION RATE] IN BLOOD: 7 MM/HR (ref 0–30)

## 2020-09-01 PROCEDURE — 85652 RBC SED RATE AUTOMATED: CPT | Performed by: NURSE PRACTITIONER

## 2020-09-01 PROCEDURE — 36415 COLL VENOUS BLD VENIPUNCTURE: CPT

## 2020-09-01 PROCEDURE — 86431 RHEUMATOID FACTOR QUANT: CPT | Performed by: NURSE PRACTITIONER

## 2020-09-01 PROCEDURE — 99214 OFFICE O/P EST MOD 30 MIN: CPT | Performed by: NURSE PRACTITIONER

## 2020-09-01 PROCEDURE — 86038 ANTINUCLEAR ANTIBODIES: CPT | Performed by: NURSE PRACTITIONER

## 2020-09-01 PROCEDURE — 86140 C-REACTIVE PROTEIN: CPT | Performed by: NURSE PRACTITIONER

## 2020-09-01 RX ORDER — DOCUSATE SODIUM 100 MG/1
100 CAPSULE, LIQUID FILLED ORAL 2 TIMES DAILY PRN
Qty: 60 CAPSULE | Refills: 2 | Status: SHIPPED | OUTPATIENT
Start: 2020-09-01

## 2020-09-01 RX ORDER — ICOSAPENT ETHYL 1000 MG/1
CAPSULE ORAL
Status: ON HOLD | COMMUNITY
Start: 2020-07-28 | End: 2021-08-19

## 2020-09-01 RX ORDER — CYCLOBENZAPRINE HCL 5 MG
5 TABLET ORAL 2 TIMES DAILY PRN
Qty: 60 TABLET | Refills: 5 | Status: SHIPPED | OUTPATIENT
Start: 2020-09-01 | End: 2021-03-23

## 2020-09-01 RX ORDER — TRAMADOL HYDROCHLORIDE 50 MG/1
50 TABLET ORAL DAILY PRN
Qty: 30 TABLET | Refills: 0 | Status: SHIPPED | OUTPATIENT
Start: 2020-09-01 | End: 2020-10-21

## 2020-09-01 NOTE — PROGRESS NOTES
"CHIEF COMPLAINT  F/u back pain. Pt sts pain has worsened since last ov.     Subjective   Damaris Williamson is a 60 y.o. female  who presents for follow-up.  She has a history of chronic back and knee pain. Reports her back pain is worse since last evaluation.    Patient had a bilateral L2-L4 RFL performed by Dr. COLLINS on 6-8-20 for management of low back pain. Patient reports 70% ONGOING relief from the procedure. However, now having pain above level of RFL. Also complains of radicular like symptoms in her left leg that is newer.    Complains of pain in her low back and knee. Today her pain is 7/10VAS.  Describes her pain as continuous aching and throbbing. Having new pain down left back, buttock, hip and left lateral thigh.  Continues with Tramadol 50 mg 1-2/day, Ibuprofen 200 mg 1 BID, and flexeril 5 mg 1/day PRN . Also using Voltaren gel on her knee.  Denies any side effects from the regimen except constipation. Takes OTC Colace PRN. Needs a prescription for this.  The regimen helps decrease her pain 50-60%.  ADL\"s by self. Denies any bowel or bladder changes except she later admits to some bowel leakage.    \"ortho wants me to get my knees reconstructed but I don't want to yet.\"  Has multiple joint problems. Does not remember having been tested for rheumatoid arthritis.    Still having elevated liver enzymes and triglyceries. Being followed by GI.    Patient remained masked during entire encounter. No cough present. I donned a mask and gloves throughout entire visit. Prior to donning mask and gloves, hand hygiene was performed, as well as when it was doffed.  I was closer than 6 feet, but not for an extended period of time. No obvious exposure to any bodily fluids.    Back Pain   This is a chronic problem. The current episode started more than 1 year ago. The problem occurs constantly. The problem has been gradually worsening (worse from last evaluation) since onset. The pain is present in the lumbar spine and " sacro-iliac. The quality of the pain is described as aching and shooting. The pain radiates to the left thigh. The pain is at a severity of 7/10. The pain is the same all the time. The symptoms are aggravated by bending, position, sitting, standing and twisting. Stiffness is present in the morning. Associated symptoms include bladder incontinence (unchanged), bowel incontinence (unchanged), leg pain, paresis, paresthesias, tingling and weakness (right leg). Pertinent negatives include no abdominal pain, chest pain, dysuria, fever, headaches, numbness (right leg from right knee down to right foot), pelvic pain, perianal numbness or weight loss. Risk factors include obesity, menopause, lack of exercise, poor posture and sedentary lifestyle. She has tried analgesics, muscle relaxant and NSAIDs for the symptoms.   Joint Pain   This is a chronic problem. The current episode started more than 1 year ago. The problem occurs constantly. The problem has been gradually worsening (unchanged since last office visit--Voltaren gel and PT). Associated symptoms include arthralgias (right knee), fatigue (occ) and weakness (right leg). Pertinent negatives include no abdominal pain, chest pain, chills, congestion, coughing, fever, headaches, nausea, neck pain or numbness (right leg from right knee down to right foot). Joint swelling: right knee.      She is s/p right TKR on 7-18-19(Dr MAST).  Past pain medications: states some strong pain medicines in the beginning     Current pain medications:   Flexeril 5 mg prn - sparingly  Ibuprofen/Naproxen- alternates  Tramadol - takes almost daily      Past therapies:  Physical Therapy: yes-- no relief  Chiropractor: yes- pt states she had a family member who was a chiropractor and he would adjust her for free but he has since passed away.   Massage Therapy: no  TENS: yes  Neck or back surgery: no  Past pain management: yes     Previous Injections: bilateral L2-L4 RFL 6-8-20  Effect of  Injection (%): 85-90%  Length of Relief: ongoing    Previous Injection: Bilateral L2-L5 RFA - 1/16/19 and 2/11/19      Previous Injection: Bilateral L2-5 Lumbar Medial Branch Blockade on 8/22/18 - 5/2/2018  Effect of Injection (%): 50%     Length of Relief: 3-months     Previous Injections: yes at previous pain management, last one maybe [id number]  Effect of Injection (%): 50%  Length of Relief: couple of months    PEG Assessment   What number best describes your pain on average in the past week?7  What number best describes how, during the past week, pain has interfered with your enjoyment of life?7  What number best describes how, during the past week, pain has interfered with your general activity?  7    The following portions of the patient's history were reviewed and updated as appropriate: allergies, current medications, past family history, past medical history, past social history, past surgical history and problem list.    Review of Systems   Constitutional: Positive for activity change (dec) and fatigue (occ). Negative for chills, fever and weight loss.   HENT: Negative for congestion.    Eyes: Negative for visual disturbance.   Respiratory: Negative for cough and shortness of breath.    Cardiovascular: Negative for chest pain.   Gastrointestinal: Positive for bowel incontinence (unchanged). Negative for abdominal pain, constipation, diarrhea and nausea.   Endocrine: Negative for polyuria.   Genitourinary: Positive for bladder incontinence (unchanged). Negative for difficulty urinating, dysuria and pelvic pain.   Musculoskeletal: Positive for arthralgias (right knee) and back pain (occ). Negative for neck pain. Joint swelling: right knee.   Allergic/Immunologic: Negative for immunocompromised state.   Neurological: Positive for tingling, weakness (right leg) and paresthesias. Negative for dizziness, light-headedness, numbness (right leg from right knee down to right foot) and headaches.  "  Psychiatric/Behavioral: Negative for agitation, sleep disturbance and suicidal ideas. The patient is not nervous/anxious.      I have reviewed and confirmed the accuracy of the ROS as documented by the MA/LPN/RN CARLITA Kaur      Vitals:    09/01/20 1259   BP: 150/90   Pulse: 90   Resp: 20   Temp: 97.5 °F (36.4 °C)   SpO2: 99%   Weight: 112 kg (247 lb 3.2 oz)   Height: 167.6 cm (66\")   PainSc:   7   PainLoc: Back     Objective   Physical Exam   Constitutional: She is oriented to person, place, and time. Vital signs are normal. She appears well-developed and well-nourished. She is cooperative.   HENT:   Head: Normocephalic and atraumatic.   Nose: Nose normal.   Eyes: Conjunctivae and lids are normal.   Neck: Trachea normal. Neck supple.   Cardiovascular: Normal rate.   Pulmonary/Chest: Effort normal.   Abdominal: Normal appearance.   Musculoskeletal:        Right knee: She exhibits decreased range of motion. Tenderness found.        Lumbar back: She exhibits decreased range of motion, tenderness and bony tenderness (moderate F18-[id number] tenderness; + loading manuever).   mild tenderness of right SI joint, minimal of left    + SLR on left, negative on right    Braces of bilateral ankles   Neurological: She is alert and oriented to person, place, and time. Gait (cane) abnormal.   Reflex Scores:       Patellar reflexes are 1+ on the right side and 1+ on the left side.  Skin: Skin is warm, dry and intact.   Psychiatric: She has a normal mood and affect. Her speech is normal and behavior is normal. Judgment and thought content normal. Cognition and memory are normal.   Nursing note and vitals reviewed.      Assessment/Plan   Damaris was seen today for back pain.    Diagnoses and all orders for this visit:    Other chronic pain    Lumbar spondylolysis  -     Cancel: MRI Lumbar Spine Without Contrast; Future  -     MRI Lumbar Spine Without Contrast; Future    DDD (degenerative disc disease), lumbar  -     Cancel: " MRI Lumbar Spine Without Contrast; Future  -     MRI Lumbar Spine Without Contrast; Future    Chronic pain of both knees    Encounter for long-term (current) use of high-risk medication    Arthralgia, unspecified joint  -     Rheumatoid Factor, Quant; Future  -     Sedimentation Rate; Future  -     C-reactive Protein; Future  -     ADRYAN With / DsDNA, RNP, Sjogrens A / B, Ji; Future    Lumbar radicular pain  -     MRI Lumbar Spine Without Contrast; Future    Other orders  -     docusate sodium (COLACE) 100 MG capsule; Take 1 capsule by mouth 2 (Two) Times a Day As Needed for Constipation.  -     traMADol (ULTRAM) 50 MG tablet; Take 1 tablet by mouth Daily As Needed for Moderate Pain .  -     cyclobenzaprine (FLEXERIL) 5 MG tablet; Take 1 tablet by mouth 2 (Two) Times a Day As Needed for Muscle Spasms.      --- The urine drug screen confirmation from 7-1-20 has been reviewed and the result is APPROPRIATE( last refill of tramadol was 5-18-20, #[id number]) based on patient history and ANT report  --- MRI- lumbar without contrast-- patient requests open MRI  --- Rheumatoid labs- RA, ADRYAN w/reflex, ESR and CRP. Orders noted.  --- Refill Tramadol and Flexeril. Patient appears stable with current regimen. No adverse effects. Regarding continuation of opioids, there is no evidence of aberrant behavior or any red flags.  The patient continues with appropriate response to opioid therapy. ADL's remain intact by self.   --- Colace.. Discussed medication with the patient.  Included in this discussion was the potential for side effects and adverse events.  Patient verbalized understanding and wished to proceed.  Prescription will be sent to pharmacy.  --- Follow-up 2 months or sooner if needed.       ANT REPORT  As part of the patient's treatment plan, I am prescribing controlled substances. The patient has been made aware of appropriate use of such medications, including potential risk of somnolence, limited ability to drive  and/or work safely, and the potential for dependence or overdose. It has also bee made clear that these medications are for use by this patient only, without concomitant use of alcohol or other substances unless prescribed.     Patient has completed prescribing agreement detailing terms of continued prescribing of controlled substances, including monitoring ANT reports, urine drug screening, and pill counts if necessary. The patient is aware that inappropriate use will results in cessation of prescribing such medications.    ANT report has been reviewed and scanned into the patient's chart.    As the clinician, I personally reviewed the ANT from 8-31-20 while the patient was in the office today.    History and physical exam exhibit continued safe and appropriate use of controlled substances.        EMR Dragon/Transcription disclaimer:   Much of this encounter note is an electronic transcription/translation of spoken language to printed text. The electronic translation of spoken language may permit erroneous, or at times, nonsensical words or phrases to be inadvertently transcribed; Although I have reviewed the note for such errors, some may still exist.

## 2020-09-02 LAB — ANA SER QL: NEGATIVE

## 2020-09-02 NOTE — PROGRESS NOTES
Please let patient know labs are negative for rheumatoid arthritis or lupus. Could still try to get a rheumatology consult if patient wishes. Let me know. Thanks. CHARLES

## 2020-09-11 NOTE — PROGRESS NOTES
Gave pt results today. Pt verbalized understanding. Pt wishes not to get rheumatology consult at this time, just happy results are negative.

## 2020-09-15 ENCOUNTER — TELEPHONE (OUTPATIENT)
Dept: PAIN MEDICINE | Facility: CLINIC | Age: 60
End: 2020-09-15

## 2020-09-15 DIAGNOSIS — M25.50 ARTHRALGIA, UNSPECIFIED JOINT: Primary | ICD-10-CM

## 2020-09-17 ENCOUNTER — HOSPITAL ENCOUNTER (OUTPATIENT)
Dept: MRI IMAGING | Facility: HOSPITAL | Age: 60
Discharge: HOME | End: 2020-09-17

## 2020-09-17 ENCOUNTER — APPOINTMENT (OUTPATIENT)
Dept: OTHER | Facility: HOSPITAL | Age: 60
End: 2020-09-17

## 2020-09-17 DIAGNOSIS — Z09 FOLLOW UP: ICD-10-CM

## 2020-09-17 DIAGNOSIS — M54.16 LUMBAR RADICULAR PAIN: ICD-10-CM

## 2020-09-17 DIAGNOSIS — M43.06 LUMBAR SPONDYLOLYSIS: ICD-10-CM

## 2020-09-17 DIAGNOSIS — M51.36 DDD (DEGENERATIVE DISC DISEASE), LUMBAR: ICD-10-CM

## 2020-09-17 PROCEDURE — 72148 MRI LUMBAR SPINE W/O DYE: CPT

## 2020-09-21 NOTE — PROGRESS NOTES
MRI shows degenerative changes(as expected). There is minimal change above area where RFL was just done. However, there is some bulging disc material hitting against her left L4 which may explain the increased left leg pain. Generally when we see this, we recommend a targeted epidural to this area. If she wants, I can order this. Also, musculature in back is decreased, need to reconsider PT. Thanks. BB

## 2020-09-22 DIAGNOSIS — M51.36 BULGING LUMBAR DISC: Primary | ICD-10-CM

## 2020-09-22 NOTE — PROGRESS NOTES
Have placed order for TFLESI. Ana sent referral to two rheum groups, one did not accept insurance. I think we are still waiting on second group. Thanks. BB

## 2020-09-22 NOTE — PROGRESS NOTES
Gave pt results of MRI today. Pt verbalized understanding. Pt would like the targeted injection for L4 area. Can you please place order? Also pt has reconsidered and would like referral for rheumatology. Can you please place referral order? Thank you.

## 2020-10-01 ENCOUNTER — LAB REQUISITION (OUTPATIENT)
Dept: LAB | Facility: HOSPITAL | Age: 60
End: 2020-10-01

## 2020-10-01 DIAGNOSIS — Z00.00 ENCOUNTER FOR GENERAL ADULT MEDICAL EXAMINATION WITHOUT ABNORMAL FINDINGS: ICD-10-CM

## 2020-10-03 PROCEDURE — U0004 COV-19 TEST NON-CDC HGH THRU: HCPCS | Performed by: ANESTHESIOLOGY

## 2020-10-05 LAB — SARS-COV-2 RNA RESP QL NAA+PROBE: NOT DETECTED

## 2020-10-06 ENCOUNTER — OUTSIDE FACILITY SERVICE (OUTPATIENT)
Dept: PAIN MEDICINE | Facility: CLINIC | Age: 60
End: 2020-10-06

## 2020-10-06 ENCOUNTER — DOCUMENTATION (OUTPATIENT)
Dept: PAIN MEDICINE | Facility: CLINIC | Age: 60
End: 2020-10-06

## 2020-10-06 PROCEDURE — 64483 NJX AA&/STRD TFRM EPI L/S 1: CPT | Performed by: ANESTHESIOLOGY

## 2020-10-06 NOTE — PROGRESS NOTES
Lumbar Transforaminal Epidural Steroid Injection (one level Unilateral)  [facility]      PREOPERATIVE DIAGNOSIS:  Lumbar Disc Displacement    POSTOPERATIVE DIAGNOSIS:  Same as preop diagnosis    PROCEDURE:  CPT 67799 --  Diagnostic Transforaminal Epidural Steroid Injection at the L4 level, on the left     PRE-PROCEDURE DISCUSSION WITH PATIENT:    Risks and complications were discussed with the patient prior to starting the procedure and informed consent was obtained.  We discussed various topics including but not limited to bleeding, infection, injury, nerve injury, paralysis, coma, death, postprocedural painful flare-up, postprocedural site soreness, and a lack of pain relief.  We discussed the diagnostic aspect of transforaminal epidural / selective nerve root blockade.    SURGEON:  Rodrigo Pelayo MD    REASON FOR PROCEDURE:    Degenerative changes are noted in the area. and Radiating pattern of pain is likely consistent with degenerative changes in the area.    SEDATION:  Patient declined administration of moderate sedation    ANESTHETIC:  Marcaine 0.25%  STEROID:  Methylprednisolone (DEPO MEDROL) 80mg/ml    DESCRIPTON OF PROCEDURE:  After obtaining informed consent, an I.V. was not started in the preoperative area. The patient taken to the operating room and was placed in the prone position with a pillow under the abdomen.  All pressure points were well padded.  EKG, blood pressure, and pulse oximeter were monitored.  The lumbar area was prepped with Chloraprep and draped in a sterile fashion. Under fluoroscopic guidance in an oblique dimension on the above mentioned side, the transverse process of the aforementioned vertebra at the junction of the body at 6 o'clock position was identified. Skin and subcutaneous tissue was anesthetized with 1% lidocaine. A 22-gauge spinal needle was introduced under fluoroscopic guidance at the above junction into the foramen without parasthesias and into  the epidural space. After confirming the position of the needle with PA, lateral, and oblique fluoroscopic views, aspiration was checked and was clear of blood or CSF.  Next, 1 mL of Omnipaque was injected. After seeing adequate spread on the corresponding nerve root, a total volume 3mL of injectate containing 1ml of the above mentioned local anesthetic, 1 ml saline,  and the above mentioned corticosteroid was injected into the epidural space.    The needle was removed intact.  Vital signs were stable throughout.        ESTIMATED BLOOD LOSS:  <5 mL  SPECIMENS:  none    COMPLICATIONS:   No complications were noted., There was no indication of vascular uptake on live injection of contrast dye., There was no indication of intrathecal uptake on live injection of contrast dye., There was not any evidence of dural puncture.   and The patient did not have any signs of postprocedure numbness nor weakness.    TOLERANCE & DISCHARGE CONDITION:    The patient tolerated the procedure well.  The patient was transported to the recovery area without difficulties.  The patient was discharged to home under the care of family in stable and satisfactory condition.    PLAN OF CARE:  1. The patient was given our standard instruction sheet.  2. The patient will Return to clinic 3 wks.  3. The patient will resume all medications as per the medication reconciliation sheet.

## 2020-10-21 RX ORDER — TRAMADOL HYDROCHLORIDE 50 MG/1
50 TABLET ORAL DAILY PRN
Qty: 30 TABLET | Refills: 0 | Status: SHIPPED | OUTPATIENT
Start: 2020-10-21 | End: 2021-03-23 | Stop reason: SDUPTHER

## 2020-10-29 ENCOUNTER — OFFICE VISIT (OUTPATIENT)
Dept: PAIN MEDICINE | Facility: CLINIC | Age: 60
End: 2020-10-29

## 2020-10-29 VITALS
HEIGHT: 66 IN | TEMPERATURE: 97.1 F | DIASTOLIC BLOOD PRESSURE: 82 MMHG | OXYGEN SATURATION: 99 % | RESPIRATION RATE: 20 BRPM | BODY MASS INDEX: 41.14 KG/M2 | HEART RATE: 79 BPM | SYSTOLIC BLOOD PRESSURE: 157 MMHG | WEIGHT: 256 LBS

## 2020-10-29 DIAGNOSIS — M25.561 CHRONIC PAIN OF BOTH KNEES: ICD-10-CM

## 2020-10-29 DIAGNOSIS — M54.2 NECK PAIN: ICD-10-CM

## 2020-10-29 DIAGNOSIS — Z79.899 ENCOUNTER FOR LONG-TERM (CURRENT) USE OF HIGH-RISK MEDICATION: ICD-10-CM

## 2020-10-29 DIAGNOSIS — G89.29 CHRONIC PAIN OF BOTH KNEES: ICD-10-CM

## 2020-10-29 DIAGNOSIS — G89.29 OTHER CHRONIC PAIN: Primary | ICD-10-CM

## 2020-10-29 DIAGNOSIS — M43.06 LUMBAR SPONDYLOLYSIS: ICD-10-CM

## 2020-10-29 DIAGNOSIS — M25.562 CHRONIC PAIN OF BOTH KNEES: ICD-10-CM

## 2020-10-29 DIAGNOSIS — M51.36 DDD (DEGENERATIVE DISC DISEASE), LUMBAR: ICD-10-CM

## 2020-10-29 PROCEDURE — 99214 OFFICE O/P EST MOD 30 MIN: CPT | Performed by: NURSE PRACTITIONER

## 2020-10-29 RX ORDER — SODIUM, POTASSIUM,MAG SULFATES 17.5-3.13G
SOLUTION, RECONSTITUTED, ORAL ORAL
COMMUNITY
Start: 2020-10-21 | End: 2021-03-23

## 2020-10-29 NOTE — PROGRESS NOTES
"CHIEF COMPLAINT  F/u back and joint pain. Pt had Lumbar Transforaminal Epidural Steroid Injection (one level Unilateral) and sts receiving 50% relief x 3 weeks and is still getting relief. Pt sts neck pain has increased recently. Pt would like to discuss neck pain.     Subjective   Damaris Williamson is a 60 y.o. female  who presents for follow-up.  She has a history of chronic back pain and joint pain. Reports this is improved since last evaluation.  Patient presents for follow-up of PROCEDURE. Patient had a TF BRYCE left L4 performed by Dr. COLLINS on 10-6-20 for management of low back pain. Patient reports 50% ONGOING relief from the procedure. Notes improvement in function and activity with regimen.    Complains of pain in her low back, neck. Today her pain is 5/10VAS. Describes her pain as continuous aching and throbbing. Pain increases with activity, household chores, stress; pain decreases with medication, rest and procedures. Pain worsens as day progresses. Continues with Tramadol 50 mg 1-2/day, Ibuprofen 200 mg 1 BID, and flexeril 5 mg 1/day PRN . Also using Voltaren gel on her knee.  Denies any side effects from the regimen except constipation, however this has improved and she states she does have a history of IBS.   The regimen helps decrease her pain 50-60%.  ADL\"s by self. Denies any bowel or bladder changes except she later admits to some bowel leakage.    Rheumatoid labs were negative. Has a pending appointment with [facility]. \"ortho wants me to get my knees reconstructed but I don't want to yet.\"  Has multiple joint problems. No relief with Tramadol.  Notes mild relief with Flexeril.  \"it kind of grinds and it freaks me out.\"     Has complaints of increasing neck pain. Has occurred after past few months. Never had surgery on neck.  Does not remember any specific imaging of neck.  Has not had PT of neck.    Patient remained masked during entire encounter. No cough present. I donned a mask and eye " protection throughout entire visit. Prior to donning mask and eye protection, hand hygiene was performed, as well as when it was doffed.  I was closer than 6 feet, but not for an extended period of time. No obvious exposure to any bodily fluids.    Back Pain  This is a chronic problem. The current episode started more than 1 year ago. The problem occurs constantly. The problem has been gradually worsening since onset. The pain is present in the lumbar spine and sacro-iliac. The quality of the pain is described as aching and shooting. The pain radiates to the left thigh. The pain is at a severity of 5/10. The pain is the same all the time. The symptoms are aggravated by bending, position, sitting, standing and twisting. Stiffness is present in the morning. Associated symptoms include bladder incontinence (unchanged), bowel incontinence (unchanged), leg pain, paresis, paresthesias and tingling. Pertinent negatives include no abdominal pain, chest pain, dysuria, fever, headaches, numbness, pelvic pain, perianal numbness, weakness or weight loss. Risk factors include obesity, menopause, lack of exercise, poor posture and sedentary lifestyle. She has tried analgesics, muscle relaxant and NSAIDs for the symptoms.   Joint Pain  This is a chronic problem. The current episode started more than 1 year ago. The problem occurs constantly. The problem has been gradually worsening. Associated symptoms include arthralgias (joint) and neck pain. Pertinent negatives include no abdominal pain, chest pain, chills, congestion, coughing, fatigue, fever, headaches, nausea, numbness or weakness. Joint swelling: right knee.      She is s/p right TKR on 7-18-19(Dr MAST).  Past pain medications: states some strong pain medicines in the beginning     Current pain medications:   Flexeril 5 mg prn - sparingly  Ibuprofen/Naproxen- alternates  Tramadol - takes almost daily      Past therapies:  Physical Therapy: yes-- no relief  Chiropractor:  yes- pt states she had a family member who was a chiropractor and he would adjust her for free but he has since passed away.   Massage Therapy: no  TENS: yes  Neck or back surgery: no  Past pain management: yes     Previous Injections: TF BRYCE left L4 10-6-20  Effect of Injection (%): 50%  Length of Relief: ONGOING    Previous Injections: bilateral L2-L4 RFL 6-8-20  Effect of Injection (%): 85-90%  Length of Relief: ongoing     Previous Injection: Bilateral L2-L5 RFA - 1/16/19 and 2/11/19      Previous Injection: Bilateral L2-5 Lumbar Medial Branch Blockade on 8/22/18 - 5/2/2018  Effect of Injection (%): 50%     Length of Relief: 3-months     Previous Injections: yes at previous pain management, last one maybe [id number]  Effect of Injection (%): 50%  Length of Relief: couple of months    PEG Assessment   What number best describes your pain on average in the past week?5  What number best describes how, during the past week, pain has interfered with your enjoyment of life?5  What number best describes how, during the past week, pain has interfered with your general activity?  5    The following portions of the patient's history were reviewed and updated as appropriate: allergies, current medications, past family history, past medical history, past social history, past surgical history and problem list.    Review of Systems   Constitutional: Positive for activity change (improved). Negative for chills, fatigue, fever and weight loss.   HENT: Negative for congestion.    Eyes: Negative for visual disturbance.   Respiratory: Negative for cough and shortness of breath.    Cardiovascular: Negative for chest pain.   Gastrointestinal: Positive for bowel incontinence (unchanged). Negative for abdominal pain, constipation, diarrhea and nausea.   Genitourinary: Positive for bladder incontinence (unchanged). Negative for difficulty urinating, dysuria and pelvic pain.   Musculoskeletal: Positive for arthralgias (joint), back pain,  "neck pain and neck stiffness. Negative for gait problem. Joint swelling: right knee.   Allergic/Immunologic: Negative for immunocompromised state.   Neurological: Positive for tingling and paresthesias. Negative for dizziness, weakness, light-headedness, numbness and headaches.   Psychiatric/Behavioral: Negative for agitation, sleep disturbance and suicidal ideas. The patient is not nervous/anxious.      I have reviewed and confirmed the accuracy of the ROS as documented by the MA/LPN/RN CARLITA Kaur      Vitals:    10/29/20 1312   BP: 157/82   Pulse: 79   Resp: 20   Temp: 97.1 °F (36.2 °C)   SpO2: 99%   Weight: 116 kg (256 lb)   Height: 167.6 cm (66\")   PainSc:   5   PainLoc: Back  Comment: AND JOINT     Objective   Physical Exam  Vitals signs and nursing note reviewed.   Constitutional:       Appearance: Normal appearance. She is well-developed.   HENT:      Head: Normocephalic and atraumatic.      Nose: Nose normal.   Eyes:      General: Lids are normal.      Conjunctiva/sclera: Conjunctivae normal.   Neck:      Musculoskeletal: Decreased range of motion. Pain with movement, spinous process tenderness and muscular tenderness present.   Cardiovascular:      Rate and Rhythm: Normal rate.   Pulmonary:      Effort: Pulmonary effort is normal. No respiratory distress.   Musculoskeletal:      Lumbar back: She exhibits decreased range of motion and tenderness.   Skin:     General: Skin is warm and dry.   Neurological:      Mental Status: She is alert and oriented to person, place, and time.      Gait: Gait normal.   Psychiatric:         Speech: Speech normal.         Behavior: Behavior normal. Behavior is cooperative.         Thought Content: Thought content normal.         Judgment: Judgment normal.             Assessment/Plan   Diagnoses and all orders for this visit:    1. Other chronic pain (Primary)    2. DDD (degenerative disc disease), lumbar    3. Lumbar spondylolysis    4. Chronic pain of both " knees    5. Encounter for long-term (current) use of high-risk medication    6. Neck pain  -     Ambulatory Referral to Physical Therapy Evaluate and treat  -     XR Spine Cervical Complete With Flex Ext; Future      --- The urine drug screen confirmation from 7-1-20 has been reviewed and the result is APPROPRIATE based on patient history and ANT report  --- PT for neck pain  --- XR- cervical  --- continue with rheum evaluation.  --- Continue with regimen. Does not need refill at this time. Will call as needed. Patient appears stable with current regimen. No adverse effects. Regarding continuation of opioids, there is no evidence of aberrant behavior or any red flags.  The patient continues with appropriate response to opioid therapy. ADL's remain intact by self.   --- The patient signed an updated copy of the controlled substance agreement on 10-29-20  --- Follow-up 2 months or sooner if needed.       ANT REPORT  As part of the patient's treatment plan, I am prescribing controlled substances. The patient has been made aware of appropriate use of such medications, including potential risk of somnolence, limited ability to drive and/or work safely, and the potential for dependence or overdose. It has also bee made clear that these medications are for use by this patient only, without concomitant use of alcohol or other substances unless prescribed.     Patient has completed prescribing agreement detailing terms of continued prescribing of controlled substances, including monitoring ANT reports, urine drug screening, and pill counts if necessary. The patient is aware that inappropriate use will results in cessation of prescribing such medications.    ANT report has been reviewed and scanned into the patient's chart.    As the clinician, I personally reviewed the ANT from 10-29-20 while the patient was in the office today.    History and physical exam exhibit continued safe and appropriate use of  controlled substances.        EMR Dragon/Transcription disclaimer:   Much of this encounter note is an electronic transcription/translation of spoken language to printed text. The electronic translation of spoken language may permit erroneous, or at times, nonsensical words or phrases to be inadvertently transcribed; Although I have reviewed the note for such errors, some may still exist.

## 2020-10-30 ENCOUNTER — HOSPITAL ENCOUNTER (OUTPATIENT)
Dept: GENERAL RADIOLOGY | Facility: HOSPITAL | Age: 60
Discharge: HOME | End: 2020-10-30
Admitting: NURSE PRACTITIONER

## 2020-10-30 DIAGNOSIS — M54.2 NECK PAIN: ICD-10-CM

## 2020-10-30 PROCEDURE — 72052 X-RAY EXAM NECK SPINE 6/>VWS: CPT

## 2020-11-02 DIAGNOSIS — M48.02 CERVICAL STENOSIS OF SPINAL CANAL: Primary | ICD-10-CM

## 2020-11-02 DIAGNOSIS — M47.812 CERVICAL FACET JOINT SYNDROME: ICD-10-CM

## 2020-11-23 NOTE — PROGRESS NOTES
Informed pt of results. Pt verbalized understanding. Also told her you released the results, usually patients can see once you've released the results so I told her she will be able to view on [employer] also.

## 2020-11-24 ENCOUNTER — ON CAMPUS - OUTPATIENT (OUTPATIENT)
Dept: URBAN - METROPOLITAN AREA HOSPITAL 108 | Facility: HOSPITAL | Age: 60
End: 2020-11-24
Payer: MEDICARE

## 2020-11-24 DIAGNOSIS — K57.30 DIVERTICULOSIS OF LARGE INTESTINE WITHOUT PERFORATION OR ABS: ICD-10-CM

## 2020-11-24 DIAGNOSIS — Z83.71 FAMILY HISTORY OF COLONIC POLYPS: ICD-10-CM

## 2020-11-24 DIAGNOSIS — K64.9 UNSPECIFIED HEMORRHOIDS: ICD-10-CM

## 2020-11-24 PROCEDURE — G0105 COLORECTAL SCRN; HI RISK IND: HCPCS | Performed by: INTERNAL MEDICINE

## 2020-11-30 ENCOUNTER — APPOINTMENT (OUTPATIENT)
Dept: MRI IMAGING | Facility: HOSPITAL | Age: 60
End: 2020-11-30

## 2020-12-23 ENCOUNTER — OFFICE VISIT (OUTPATIENT)
Dept: PAIN MEDICINE | Facility: CLINIC | Age: 60
End: 2020-12-23

## 2020-12-23 ENCOUNTER — RESULTS ENCOUNTER (OUTPATIENT)
Dept: PAIN MEDICINE | Facility: CLINIC | Age: 60
End: 2020-12-23

## 2020-12-23 VITALS
RESPIRATION RATE: 16 BRPM | WEIGHT: 260.6 LBS | HEART RATE: 96 BPM | OXYGEN SATURATION: 97 % | BODY MASS INDEX: 41.88 KG/M2 | HEIGHT: 66 IN | SYSTOLIC BLOOD PRESSURE: 167 MMHG | DIASTOLIC BLOOD PRESSURE: 91 MMHG | TEMPERATURE: 97.7 F

## 2020-12-23 DIAGNOSIS — M50.30 DDD (DEGENERATIVE DISC DISEASE), CERVICAL: ICD-10-CM

## 2020-12-23 DIAGNOSIS — Z79.899 ENCOUNTER FOR LONG-TERM (CURRENT) USE OF HIGH-RISK MEDICATION: ICD-10-CM

## 2020-12-23 DIAGNOSIS — G89.29 OTHER CHRONIC PAIN: ICD-10-CM

## 2020-12-23 DIAGNOSIS — M51.36 DDD (DEGENERATIVE DISC DISEASE), LUMBAR: ICD-10-CM

## 2020-12-23 DIAGNOSIS — G89.29 OTHER CHRONIC PAIN: Primary | ICD-10-CM

## 2020-12-23 PROCEDURE — 99214 OFFICE O/P EST MOD 30 MIN: CPT | Performed by: NURSE PRACTITIONER

## 2020-12-23 PROCEDURE — 80305 DRUG TEST PRSMV DIR OPT OBS: CPT | Performed by: NURSE PRACTITIONER

## 2020-12-23 NOTE — PROGRESS NOTES
CHIEF COMPLAINT  F/U back and joint pain- patient states that her pain has remained the same since her last visit.     Subjective   Damaris Williamson is a 60 y.o. female  who presents for follow-up.  She has a history of back pain, neck pain, joint pain.    Patient is seen for her chronic back and neck pain.    lumbar - Has responded well to intermittent injections with Dr. Pelayo.  Most recent was a left L4 lumbar transforaminal epidural steroid injection on 10/6/2020.  This provided at least 50% relief which is ongoing    cervical -worsening at the last office visit.  X-ray and physical therapy was ordered. Went for one visit at PT, was given HEP which has helped some.  She did have a cervical MRI completed since the last visit as well.    Complains of pain in her low back, neck. Today her pain is 5/10VAS. Continues with Tramadol 50 mg 1-2/day, and flexeril 5 mg 1/day PRN . Also using Voltaren gel on her knee.  Denies any side effects from the regimen except constipation, reports that this is managed with OTCs. The regimen helps decrease her pain 50-60%.  ADL's by self. GI recommended to avoid NSAID's if possible.      Has a pending appointment with [facility] due to multiple joint complaints.    Patient remained masked during entire encounter. No cough present. I donned a mask and eye protection throughout entire visit. Prior to donning mask and eye protection, hand hygiene was performed, as well as when it was doffed.  I was closer than 6 feet, but not for an extended period of time. No obvious exposure to any bodily fluids.    Back Pain  This is a chronic problem. The current episode started more than 1 month ago. The problem occurs daily. The problem has been waxing and waning since onset. The pain is present in the lumbar spine. The quality of the pain is described as aching and burning. The pain radiates to the left thigh. The pain is at a severity of 6/10. The symptoms are aggravated by standing and  sitting (prolonged sitting, lifting). Associated symptoms include headaches, numbness and weakness (right leg). Pertinent negatives include no abdominal pain, chest pain, dysuria or fever. She has tried analgesics, NSAIDs, heat, ice, muscle relaxant and home exercises for the symptoms. The treatment provided moderate relief.   Neck Pain   The current episode started more than 1 month ago. The problem occurs daily. The problem has been waxing and waning. The pain is present in the left side, midline and right side. The quality of the pain is described as aching. The pain is at a severity of 6/10. Associated symptoms include headaches, numbness and weakness (right leg). Pertinent negatives include no chest pain or fever. She has tried NSAIDs, neck support, muscle relaxants and home exercises for the symptoms. The treatment provided moderate relief.              PEG Assessment   What number best describes your pain on average in the past week?5  What number best describes how, during the past week, pain has interfered with your enjoyment of life?4  What number best describes how, during the past week, pain has interfered with your general activity?  5      The following portions of the patient's history were reviewed and updated as appropriate: allergies, current medications, past family history, past medical history, past social history, past surgical history and problem list.    Review of Systems   Constitutional: Positive for activity change (decreased) and fatigue. Negative for chills and fever.   HENT: Negative for congestion.    Eyes: Negative for visual disturbance.   Respiratory: Negative for chest tightness and shortness of breath.    Cardiovascular: Negative for chest pain.   Gastrointestinal: Positive for constipation and diarrhea. Negative for abdominal pain.   Genitourinary: Negative for difficulty urinating, dyspareunia and dysuria.   Musculoskeletal: Positive for arthralgias, back pain and neck pain.  "  Neurological: Positive for weakness (right leg), numbness and headaches. Negative for dizziness and light-headedness.   Psychiatric/Behavioral: Positive for sleep disturbance. Negative for agitation, self-injury and suicidal ideas. The patient is nervous/anxious.      I have reviewed and confirmed the accuracy of the ROS as documented by the MA/LPN/RN CARLITA De La Cruz    Vitals:    12/23/20 1122   BP: 167/91   Pulse: 96   Resp: 16   Temp: 97.7 °F (36.5 °C)   SpO2: 97%   Weight: 118 kg (260 lb 9.6 oz)   Height: 167.6 cm (66\")   PainSc:   6   PainLoc: Back         Objective   Physical Exam  Vitals signs and nursing note reviewed.   Constitutional:       General: She is not in acute distress.     Appearance: Normal appearance. She is not ill-appearing.   HENT:      Head: Atraumatic.   Eyes:      Conjunctiva/sclera: Conjunctivae normal.   Cardiovascular:      Rate and Rhythm: Normal rate.   Pulmonary:      Effort: Pulmonary effort is normal. No respiratory distress.   Musculoskeletal:      Cervical back: She exhibits tenderness, bony tenderness and spasm.      Lumbar back: She exhibits tenderness, bony tenderness and spasm.   Skin:     General: Skin is warm and dry.   Neurological:      Mental Status: She is alert and oriented to person, place, and time.      Motor: Motor function is intact. No weakness.      Gait: Gait normal.   Psychiatric:         Mood and Affect: Mood normal.         Behavior: Behavior normal.       Assessment/Plan   Diagnoses and all orders for this visit:    1. Other chronic pain (Primary)    2. DDD (degenerative disc disease), lumbar    3. DDD (degenerative disc disease), cervical    4. Encounter for long-term (current) use of high-risk medication      --- Wishes to continue with HEP/PT for neck pain.   --- She will proceed with rheum eval as scheduled in Feb 2021  --- Continue  Tramadol. Patient appears stable with current regimen. No adverse effects. Regarding continuation of opioids, " there is no evidence of aberrant behavior or any red flags.  The patient continues with appropriate response to opioid therapy. ADL's remain intact by self.   --- Routine UDS in office today as part of monitoring requirements for controlled substances.  The specimen was viewed and the immunoassay result reviewed and is NEG.  This specimen will [facility] for confirmation.     --- Follow-up 2 months          ANT REPORT  As part of the patient's treatment plan, I am prescribing controlled substances. The patient has been made aware of appropriate use of such medications, including potential risk of somnolence, limited ability to drive and/or work safely, and the potential for dependence or overdose. It has also bee made clear that these medications are for use by this patient only, without concomitant use of alcohol or other substances unless prescribed.     Patient has completed prescribing agreement detailing terms of continued prescribing of controlled substances, including monitoring ANT reports, urine drug screening, and pill counts if necessary. The patient is aware that inappropriate use will results in cessation of prescribing such medications.    ANT report has been reviewed and scanned into the patient's chart.    As the clinician, I personally reviewed the ANT from 12/23/2020 while the patient was in the office today.    History and physical exam exhibit continued safe and appropriate use of controlled substances.    EMR Dragon/Transcription disclaimer:   Much of this encounter note is an electronic transcription/translation of spoken language to printed text. The electronic translation of spoken language may permit erroneous, or at times, nonsensical words or phrases to be inadvertently transcribed; Although I have reviewed the note for such errors, some may still exist.

## 2021-03-23 ENCOUNTER — OFFICE VISIT (OUTPATIENT)
Dept: PAIN MEDICINE | Facility: CLINIC | Age: 61
End: 2021-03-23

## 2021-03-23 VITALS
TEMPERATURE: 97.8 F | SYSTOLIC BLOOD PRESSURE: 151 MMHG | OXYGEN SATURATION: 95 % | HEIGHT: 66 IN | HEART RATE: 85 BPM | WEIGHT: 266 LBS | RESPIRATION RATE: 18 BRPM | DIASTOLIC BLOOD PRESSURE: 80 MMHG | BODY MASS INDEX: 42.75 KG/M2

## 2021-03-23 DIAGNOSIS — M54.41 CHRONIC MIDLINE LOW BACK PAIN WITH RIGHT-SIDED SCIATICA: ICD-10-CM

## 2021-03-23 DIAGNOSIS — M51.36 DDD (DEGENERATIVE DISC DISEASE), LUMBAR: ICD-10-CM

## 2021-03-23 DIAGNOSIS — M50.30 DDD (DEGENERATIVE DISC DISEASE), CERVICAL: ICD-10-CM

## 2021-03-23 DIAGNOSIS — G89.29 OTHER CHRONIC PAIN: Primary | ICD-10-CM

## 2021-03-23 DIAGNOSIS — G89.29 CHRONIC MIDLINE LOW BACK PAIN WITH RIGHT-SIDED SCIATICA: ICD-10-CM

## 2021-03-23 PROCEDURE — 99214 OFFICE O/P EST MOD 30 MIN: CPT | Performed by: NURSE PRACTITIONER

## 2021-03-23 RX ORDER — TRAMADOL HYDROCHLORIDE 50 MG/1
50 TABLET ORAL DAILY PRN
Qty: 30 TABLET | Refills: 0 | Status: SHIPPED | OUTPATIENT
Start: 2021-03-23 | End: 2021-07-14 | Stop reason: SDUPTHER

## 2021-03-23 RX ORDER — CYCLOBENZAPRINE HCL 5 MG
5 TABLET ORAL 2 TIMES DAILY PRN
Qty: 60 TABLET | Refills: 1 | Status: SHIPPED | OUTPATIENT
Start: 2021-03-23 | End: 2021-09-02 | Stop reason: SDUPTHER

## 2021-03-23 NOTE — PROGRESS NOTES
CHIEF COMPLAINT  Follow-up for back pain.    Subjective   Damaris Williamson is a 60 y.o. female  who presents for follow-up.  She has a history of back and neck pain.    Patient is seen for her chronic back and neck pain.     lumbar - Has responded well to intermittent injections with Dr. Pelayo.  Most recent was a left L4 lumbar transforaminal epidural steroid injection on 10/6/2020.  This provided at least 50% relief which is ongoing     Lumbar RFA 6/2020.  Reports 80-90% relief for 8 months, beginning to wane.  Not ready to repeat yet.       cervical -worsening at the last office visit.  X-ray and physical therapy was ordered. Went for one visit at PT, was given HEP which has helped some.  She did have a cervical MRI completed since the last visit as well.     Complains of pain in her low back, neck. Today her pain is 5/10VAS. Continues with Tramadol 50 mg 1-2/day, and flexeril 5 mg 1/day PRN . Also using Voltaren gel on her knee.  Denies any side effects from the regimen except constipation, reports that this is managed with OTCs. The regimen helps decrease her pain 50-60%.  ADL's by self. GI recommended to avoid NSAID's if possible.      Has a pending appointment with [place] [facility] due to multiple joint complaints.    Patient remained masked during entire encounter. No cough present. I donned a mask and eye protection throughout entire visit. Prior to donning mask and eye protection, hand hygiene was performed, as well as when it was doffed.  I was closer than 6 feet, but not for an extended period of time. No obvious exposure to any bodily fluids.    Back Pain  This is a chronic problem. The current episode started more than 1 month ago. The problem occurs daily. The problem has been waxing and waning since onset. The pain is present in the lumbar spine. The quality of the pain is described as aching and burning. The pain radiates to the left thigh. The pain is at a severity of 5/10. The symptoms are  aggravated by standing and sitting (prolonged sitting, lifting). Associated symptoms include headaches and numbness (bilateral legs). Pertinent negatives include no abdominal pain, chest pain, dysuria, fever or weakness. She has tried analgesics, NSAIDs, heat, ice, muscle relaxant and home exercises for the symptoms. The treatment provided moderate relief.   Neck Pain   The current episode started more than 1 month ago. The problem occurs daily. The problem has been waxing and waning. The pain is present in the left side, midline and right side. The quality of the pain is described as aching. The pain is at a severity of 5/10. Associated symptoms include headaches and numbness (bilateral legs). Pertinent negatives include no chest pain, fever or weakness. She has tried NSAIDs, neck support, muscle relaxants and home exercises for the symptoms. The treatment provided moderate relief.     PEG Assessment   What number best describes your pain on average in the past week?8  What number best describes how, during the past week, pain has interfered with your enjoyment of life?8  What number best describes how, during the past week, pain has interfered with your general activity?  8    The following portions of the patient's history were reviewed and updated as appropriate: allergies, current medications, past family history, past medical history, past social history, past surgical history and problem list.    Review of Systems   Constitutional: Negative for fatigue and fever.   HENT: Negative for congestion.    Eyes: Negative for visual disturbance.   Respiratory: Negative for cough, shortness of breath and wheezing.    Cardiovascular: Negative.  Negative for chest pain.   Gastrointestinal: Positive for constipation. Negative for abdominal pain and diarrhea.   Genitourinary: Negative for difficulty urinating and dysuria.   Musculoskeletal: Positive for back pain and neck pain.   Neurological: Positive for numbness  "(bilateral legs) and headaches. Negative for weakness.   Psychiatric/Behavioral: Positive for sleep disturbance. Negative for suicidal ideas. The patient is nervous/anxious.      I have reviewed and confirmed the accuracy of the ROS as documented by the MA/LPN/RN CARLITA De La Cruz    Vitals:    03/23/21 1056   BP: 151/80   Pulse: 85   Resp: 18   Temp: 97.8 °F (36.6 °C)   SpO2: 95%   Weight: 121 kg (266 lb)   Height: 167.6 cm (66\")   PainSc:   5   PainLoc: Back     Objective   Physical Exam  Vitals and nursing note reviewed.   Constitutional:       General: She is not in acute distress.     Appearance: Normal appearance. She is not ill-appearing.   HENT:      Head: Atraumatic.   Eyes:      Conjunctiva/sclera: Conjunctivae normal.   Cardiovascular:      Rate and Rhythm: Normal rate.   Pulmonary:      Effort: Pulmonary effort is normal. No respiratory distress.   Musculoskeletal:      Cervical back: Spasms, tenderness and bony tenderness present.      Lumbar back: Spasms, tenderness and bony tenderness present.      Comments: +lumbar facet tenderness/loading   Skin:     General: Skin is warm and dry.   Neurological:      Mental Status: She is alert and oriented to person, place, and time.      Motor: Motor function is intact. No weakness.      Gait: Gait normal.   Psychiatric:         Mood and Affect: Mood normal.         Behavior: Behavior normal.             Assessment/Plan   Diagnoses and all orders for this visit:    1. Other chronic pain (Primary)    2. DDD (degenerative disc disease), cervical    3. DDD (degenerative disc disease), lumbar    4. Chronic midline low back pain with right-sided sciatica    Other orders  -     traMADol (ULTRAM) 50 MG tablet; Take 1 tablet by mouth Daily As Needed for Moderate Pain .  Dispense: 30 tablet; Refill: 0  -     cyclobenzaprine (FLEXERIL) 5 MG tablet; Take 1 tablet by mouth 2 (Two) Times a Day As Needed for Muscle Spasms.  Dispense: 60 tablet; Refill: 1      --- Refill " Tramadol. Patient appears stable with current regimen. No adverse effects. Regarding continuation of opioids, there is no evidence of aberrant behavior or any red flags.  The patient continues with appropriate response to opioid therapy. ADL's remain intact by self.   --- Repeat lumbar RFA prn  --- The urine drug screen confirmation from 12/23/2020 has been reviewed and the result is appropriate based on patient history and ANT report  --- The patient signed an updated copy of the controlled substance agreement on 10/29/2020  --- Follow-up 3 months        ANT REPORT  As part of the patient's treatment plan, I am prescribing controlled substances. The patient has been made aware of appropriate use of such medications, including potential risk of somnolence, limited ability to drive and/or work safely, and the potential for dependence or overdose. It has also bee made clear that these medications are for use by this patient only, without concomitant use of alcohol or other substances unless prescribed.     Patient has completed prescribing agreement detailing terms of continued prescribing of controlled substances, including monitoring ANT reports, urine drug screening, and pill counts if necessary. The patient is aware that inappropriate use will results in cessation of prescribing such medications.    ANT report has been reviewed and scanned into the patient's chart.    As the clinician, I personally reviewed the ANT from 3/23/2021 while the patient was in the office today.    History and physical exam exhibit continued safe and appropriate use of controlled substances.    EMR Dragon/Transcription disclaimer:   Much of this encounter note is an electronic transcription/translation of spoken language to printed text. The electronic translation of spoken language may permit erroneous, or at times, nonsensical words or phrases to be inadvertently transcribed; Although I have reviewed the note for such  errors, some may still exist.

## 2021-03-24 ENCOUNTER — BULK ORDERING (OUTPATIENT)
Dept: CASE MANAGEMENT | Facility: OTHER | Age: 61
End: 2021-03-24

## 2021-03-24 DIAGNOSIS — Z23 IMMUNIZATION DUE: ICD-10-CM

## 2021-06-23 ENCOUNTER — TRANSCRIBE ORDERS (OUTPATIENT)
Dept: LAB | Facility: SURGERY CENTER | Age: 61
End: 2021-06-23

## 2021-06-23 ENCOUNTER — TRANSCRIBE ORDERS (OUTPATIENT)
Dept: SURGERY | Facility: SURGERY CENTER | Age: 61
End: 2021-06-23

## 2021-06-23 ENCOUNTER — PREP FOR SURGERY (OUTPATIENT)
Dept: SURGERY | Facility: SURGERY CENTER | Age: 61
End: 2021-06-23

## 2021-06-23 ENCOUNTER — OFFICE VISIT (OUTPATIENT)
Dept: PAIN MEDICINE | Facility: CLINIC | Age: 61
End: 2021-06-23

## 2021-06-23 VITALS
BODY MASS INDEX: 43.2 KG/M2 | TEMPERATURE: 97.3 F | DIASTOLIC BLOOD PRESSURE: 82 MMHG | HEIGHT: 66 IN | OXYGEN SATURATION: 98 % | RESPIRATION RATE: 18 BRPM | SYSTOLIC BLOOD PRESSURE: 142 MMHG | HEART RATE: 80 BPM | WEIGHT: 268.8 LBS

## 2021-06-23 DIAGNOSIS — G89.29 OTHER CHRONIC PAIN: Primary | ICD-10-CM

## 2021-06-23 DIAGNOSIS — M50.30 DDD (DEGENERATIVE DISC DISEASE), CERVICAL: ICD-10-CM

## 2021-06-23 DIAGNOSIS — Z01.818 OTHER SPECIFIED PRE-OPERATIVE EXAMINATION: Primary | ICD-10-CM

## 2021-06-23 DIAGNOSIS — Z41.9 SURGERY, ELECTIVE: Primary | ICD-10-CM

## 2021-06-23 DIAGNOSIS — Z79.899 ENCOUNTER FOR LONG-TERM (CURRENT) USE OF HIGH-RISK MEDICATION: ICD-10-CM

## 2021-06-23 DIAGNOSIS — M47.816 LUMBAR FACET ARTHROPATHY: Primary | ICD-10-CM

## 2021-06-23 DIAGNOSIS — M51.36 DDD (DEGENERATIVE DISC DISEASE), LUMBAR: ICD-10-CM

## 2021-06-23 PROCEDURE — 80305 DRUG TEST PRSMV DIR OPT OBS: CPT | Performed by: NURSE PRACTITIONER

## 2021-06-23 PROCEDURE — 99214 OFFICE O/P EST MOD 30 MIN: CPT | Performed by: NURSE PRACTITIONER

## 2021-06-23 RX ORDER — SODIUM CHLORIDE 0.9 % (FLUSH) 0.9 %
10 SYRINGE (ML) INJECTION EVERY 12 HOURS SCHEDULED
Status: CANCELLED | OUTPATIENT
Start: 2021-06-23

## 2021-06-23 RX ORDER — MECLIZINE HYDROCHLORIDE 25 MG/1
25 TABLET ORAL EVERY 8 HOURS PRN
COMMUNITY
Start: 2021-05-12 | End: 2023-03-09

## 2021-06-23 RX ORDER — SODIUM CHLORIDE 0.9 % (FLUSH) 0.9 %
10 SYRINGE (ML) INJECTION AS NEEDED
Status: CANCELLED | OUTPATIENT
Start: 2021-06-23

## 2021-06-23 NOTE — PROGRESS NOTES
CHIEF COMPLAINT  Follow-up for back and neck pain.    Subjective   Damaris Williamson is a 61 y.o. female  who presents for follow-up.  She has a history of chronic back and neck pain.    Patient is seen for her chronic back and neck pain.    lumbar - Has responded well to intermittent injections with Dr. Pelayo.  Most recent was a left L4 lumbar transforaminal epidural steroid injection on 10/6/2020.  This provided at least 50% relief which is ongoing. No radicular complaints today.      Lumbar RFA 6/2020 (bilateral L2-L4).  Reports 80-90% relief for 10 or more months, beginning to wane.  Interested in repeating.      cervical - continues with HEP which so far has been beneficial      Complains of pain in her low back, neck. Today her pain is 6/10VAS. Continues with Tramadol 50 mg 1-2/day, and flexeril 5 mg 1/day PRN . Also using Voltaren gel on her knee.  Denies any side effects from the regimen except constipation, reports that this is managed with OTCs. The regimen helps decrease her pain 50-60%.  ADL's by self. GI recommended to avoid NSAID's if possible.       Has seen rheum. No inflammatory arthritis.      Patient remained masked during entire encounter. No cough present. I donned a mask and eye protection throughout entire visit. Prior to donning mask and eye protection, hand hygiene was performed, as well as when it was doffed.  I was closer than 6 feet, but not for an extended period of time. No obvious exposure to any bodily fluids.    Back Pain  This is a chronic problem. The current episode started more than 1 month ago. The problem occurs daily. The problem has been waxing and waning since onset. The pain is present in the lumbar spine. The quality of the pain is described as aching and burning. The pain radiates to the left thigh. The pain is at a severity of 6/10. The symptoms are aggravated by standing and sitting (prolonged sitting, lifting). Associated symptoms include headaches, numbness (bilateral  lower legs) and weakness (right leg). Pertinent negatives include no abdominal pain, chest pain, dysuria or fever. She has tried analgesics, NSAIDs, heat, ice, muscle relaxant and home exercises for the symptoms. The treatment provided moderate relief.   Neck Pain   The current episode started more than 1 month ago. The problem occurs daily. The problem has been waxing and waning. The pain is present in the left side, midline and right side. The quality of the pain is described as aching. The pain is at a severity of 6/10. Associated symptoms include headaches, numbness (bilateral lower legs) and weakness (right leg). Pertinent negatives include no chest pain or fever. She has tried NSAIDs, neck support, muscle relaxants and home exercises for the symptoms. The treatment provided moderate relief.        PEG Assessment   What number best describes your pain on average in the past week?6  What number best describes how, during the past week, pain has interfered with your enjoyment of life?8  What number best describes how, during the past week, pain has interfered with your general activity?  8      The following portions of the patient's history were reviewed and updated as appropriate: allergies, current medications, past family history, past medical history, past social history, past surgical history and problem list.    Review of Systems   Constitutional: Positive for fatigue. Negative for fever.   HENT: Negative for congestion.    Eyes: Negative for visual disturbance.   Respiratory: Negative for cough, shortness of breath and wheezing.    Cardiovascular: Negative.  Negative for chest pain.   Gastrointestinal: Negative for abdominal pain, constipation and diarrhea.   Genitourinary: Negative for difficulty urinating and dysuria.   Musculoskeletal: Positive for back pain and neck pain.   Neurological: Positive for dizziness, weakness (right leg), numbness (bilateral lower legs) and headaches.  "  Psychiatric/Behavioral: Positive for sleep disturbance. Negative for suicidal ideas. The patient is nervous/anxious.      I have reviewed and confirmed the accuracy of the ROS as documented by the MA/LPN/RN Sandra PoncenisCARLITA    Vitals:    06/23/21 1116   BP: 142/82   Pulse: 80   Resp: 18   Temp: 97.3 °F (36.3 °C)   SpO2: 98%   Weight: 122 kg (268 lb 12.8 oz)   Height: 167.6 cm (66\")   PainSc:   6   PainLoc: Back       Objective   Physical Exam  Vitals and nursing note reviewed.   Constitutional:       General: She is not in acute distress.     Appearance: Normal appearance. She is not ill-appearing.   Pulmonary:      Effort: Pulmonary effort is normal. No respiratory distress.   Musculoskeletal:      Cervical back: Spasms, tenderness and bony tenderness present.      Lumbar back: Spasms, tenderness and bony tenderness present.      Comments: +lumbar facet tenderness/loading   Skin:     General: Skin is warm and dry.   Neurological:      Mental Status: She is alert and oriented to person, place, and time.      Motor: Motor function is intact. No weakness.      Gait: Gait normal.   Psychiatric:         Mood and Affect: Mood normal.         Behavior: Behavior normal.           Assessment/Plan   Diagnoses and all orders for this visit:    1. Other chronic pain (Primary)    2. DDD (degenerative disc disease), lumbar    3. DDD (degenerative disc disease), cervical    4. Encounter for long-term (current) use of high-risk medication      --- Bilateral L2-L4 RFA   --------  Education about Radiofrequency Lesioning of Medial Branches:    The medial branch blockade was intended for diagnostic purposes, with the intent of offering the patient Radiofrequency thermal rhizotomy if the MBB is diagnostically effective.  The diagnostic blockade is necessary to determine the likelihood that RF therapy could be efficacious in providing long term relief to the patient.  As indicated above, diagnostic efficacy was established.  "     In the RF procedure, needles are placed to the joint lines in the same fashion, and after testing, the needle tips are heated to thermally treat the nerves, blocking the nerves by in essence damaging the nerves with the heat treatment.      Medically, a successful RF procedure should provide a patient with 50% pain relief or more for at least 6 months.  We estimate a likelihood of about an 80% chance that medical success will be realized.  We discussed & educated once again that not all patients have a medically successful result, and the patient voices understanding.  However, our clinical experience suggests that successful patients receive relief more in the range of 12 months on average.  (We also discussed that a fortunate minority of patients receive therapeutic success from the MBB, and may not require RF ablation.  If a patient receives more than 8 weeks of relief from MBB, then occasional repeat MBB for therapeutic purposes is a very reasonable alternative therapy.  This course of therapy is consistent with our LCDs according to our CMS  in the area, and therefore other insurance providers should follow accordingly.  We will monitor our patients to screen for these therapeutic responders and will offer RF therapy only when necessary.  However, in this clinical scenario, this therapeutic result was not realized, and therefore Radiofrequency Lesioning is medically necessary.)      We discussed that MBB & RF are not without risks.  Guidelines regarding anticoagulant use & neuraxial procedures will be respected.  Patients that are ill or otherwise may be at risk for sepsis will not have their spines accessed by neuraxial injections of any type.  This patient will not be offered these therapies if there is an increased risk.   We discussed that there is a risk of postprocedural pain and also a risk of worsening of clinical picture with these procedures as with any neuraxial procedure.  All  invasive procedures have risks including but not limited to bleeding, infection, injury, nerve injury, paralysis, coma, death, lack of pain relief, and worsening of clinical picture.      In this education session, all of these topics were covered and the patient voiced understanding.    ---------    --- Continue Tramadol. Patient appears stable with current regimen. No adverse effects. Regarding continuation of opioids, there is no evidence of aberrant behavior or any red flags.  The patient continues with appropriate response to opioid therapy. ADL's remain intact by self.   --- Routine UDS in office today as part of monitoring requirements for controlled substances.  The specimen was viewed and the immunoassay result reviewed and is NEG.  This specimen will be sent to [facility] laboratory for confirmation.     --- The patient signed an updated copy of the controlled substance agreement on 10/29/2020  --- Follow-up 3 months/sooner if needed        ANT REPORT  As part of the patient's treatment plan, I am prescribing controlled substances. The patient has been made aware of appropriate use of such medications, including potential risk of somnolence, limited ability to drive and/or work safely, and the potential for dependence or overdose. It has also bee made clear that these medications are for use by this patient only, without concomitant use of alcohol or other substances unless prescribed.     Patient has completed prescribing agreement detailing terms of continued prescribing of controlled substances, including monitoring ANT reports, urine drug screening, and pill counts if necessary. The patient is aware that inappropriate use will results in cessation of prescribing such medications.    As the clinician, I personally reviewed the ANT from 6/23/2021 while the patient was in the office today.    History and physical exam exhibit continued safe and appropriate use of controlled substances.    EMR  Dragon/Transcription disclaimer:   Much of this encounter note is an electronic transcription/translation of spoken language to printed text. The electronic translation of spoken language may permit erroneous, or at times, nonsensical words or phrases to be inadvertently transcribed; Although I have reviewed the note for such errors, some may still exist.

## 2021-07-14 ENCOUNTER — TELEPHONE (OUTPATIENT)
Dept: PAIN MEDICINE | Facility: CLINIC | Age: 61
End: 2021-07-14

## 2021-07-14 RX ORDER — TRAMADOL HYDROCHLORIDE 50 MG/1
50 TABLET ORAL DAILY PRN
Qty: 30 TABLET | Refills: 0 | Status: SHIPPED | OUTPATIENT
Start: 2021-07-14 | End: 2021-09-02 | Stop reason: SDUPTHER

## 2021-07-14 NOTE — TELEPHONE ENCOUNTER
Reviewed RAJI and ANT. Both updated and appropriate. Refill appropriate.      Also, Kristal. Please see the other telephone note from today. Sounds like she is trying to re-schedule her procedure. Just clarify this with her.  If so we will push back her follow up accordingly (6 weeks post RFA). Thanks.

## 2021-07-14 NOTE — TELEPHONE ENCOUNTER
Medication Refill Request    Date of phone call: 21    Medication being requested: Tramadol 50 mg  si tab po daily prn  Qty: 30    Date of last visit: 21    Date of last refill:     ANT up to date?: no    Next Follow up?: 21    Any new pertinent information? (i.e, new medication allergies, new use of medications, change in patient's health or condition, non-compliance or inconsistency with prescribing agreement?):    [FreeTextEntry1] : Advised to continue medications as directed. Life style and diet modifications were reviewed

## 2021-07-14 NOTE — TELEPHONE ENCOUNTER
Caller: GILBERTO COSBY    Relationship to patient: SELF    Best call back number: [phone]    Chief complaint:     Type of visit: ABLASION    Requested date:      If rescheduling, when is the original appointment: 07/22/2021    Additional notes: PATIENT REQUESTING TO BE RESCHEDULED FOR AN APPT IN AUGUST PLEASE

## 2021-07-14 NOTE — TELEPHONE ENCOUNTER
Caller: GILBERTO COSBY    Relationship:SELF     Best call back number: [phone]    Medication needed: TRAMADOL: 50MG :1 TAB DAILY  Requested Prescriptions      No prescriptions requested or ordered in this encounter       When do you need the refill by: ASAP    What additional details did the patient provide when requesting the medication:     Does the patient have less than a 3 day supply:  [x] Yes  [] No    What is the patient's preferred pharmacy:[facility]: [address]: [phone]

## 2021-07-20 NOTE — TELEPHONE ENCOUNTER
Caller: PATIENT    Reason for call:  PATIENT IS CALLING TO CHECK ON STATUS OF MESSAGE, SHE IS WAITING TO BE CALLED AND RESCHEDULED.   SHES RECEIVED REMINDER CALLS FOR COVID TESTING APPT. BUT HAS NOT BEEN RESCHEDULED.      PLEASE ADVISE,     Caller# [phone]

## 2021-07-22 ENCOUNTER — APPOINTMENT (OUTPATIENT)
Dept: GENERAL RADIOLOGY | Facility: SURGERY CENTER | Age: 61
End: 2021-07-22

## 2021-08-16 ENCOUNTER — TRANSCRIBE ORDERS (OUTPATIENT)
Dept: LAB | Facility: SURGERY CENTER | Age: 61
End: 2021-08-16

## 2021-08-16 DIAGNOSIS — Z00.00 ENCOUNTER FOR GENERAL ADULT MEDICAL EXAMINATION WITHOUT ABNORMAL FINDINGS: Primary | ICD-10-CM

## 2021-08-16 NOTE — SIGNIFICANT NOTE
Patient educated on the following :    - If you are receiving Sedation for your procedure Nothing to Eat 6 hours and only clear liquids for 2 hours prior to your procedure.    -Your required COVID Test is Scheduled on   8/17       Between the Hours of 5087-6616  -You will only be notified if your COVID test Result is POSITIVE  -The importance of reducing your number of contacts by self quarantining after you COVID test until the date of your PROCEDURE  -You will need to have someone drive you home after your PROCEDURE and remain with you for 24 hours after the PROCEDURE  - The date of your procedure, your are welcome to have one visitor at bedside or remain within 10-15 minutes of [email or web address]  -You will need to arrive at     0945      on        8/19   for your PROCEDURE  -Please contact [facility] at: [phone] with any questions and/or concerns

## 2021-08-17 ENCOUNTER — LAB (OUTPATIENT)
Dept: LAB | Facility: SURGERY CENTER | Age: 61
End: 2021-08-17

## 2021-08-17 DIAGNOSIS — Z00.00 ENCOUNTER FOR GENERAL ADULT MEDICAL EXAMINATION WITHOUT ABNORMAL FINDINGS: ICD-10-CM

## 2021-08-17 LAB — SARS-COV-2 ORF1AB RESP QL NAA+PROBE: NOT DETECTED

## 2021-08-17 PROCEDURE — C9803 HOPD COVID-19 SPEC COLLECT: HCPCS

## 2021-08-17 PROCEDURE — U0004 COV-19 TEST NON-CDC HGH THRU: HCPCS | Performed by: SURGERY

## 2021-08-18 PROCEDURE — S0260 H&P FOR SURGERY: HCPCS | Performed by: ANESTHESIOLOGY

## 2021-08-18 NOTE — H&P
Brief Pre-procedural / Pre-operative H&P        -----    Pertinent Diagnosis:   Lumbar facet arthropathy/lumbar spondylosis.    Proposed Procedure: Lumbar medial branch radiofrequency ablation, this anticipated bilaterally at the L2 in the L3 and L4 medial branches      Subjective   Damaris Williamson is a 61 y.o. female  who presents for intervention.  She has a history of low back pain with axial elements.      History of Present Illness     She has had multiple previous medial branch blockade's that were diagnostic positive in the past and has had significant therapeutic success on more than one occasion from radiofrequency ablation at the same levels.  Recurrent aching axial type back pain.  Repeating RF ablation is indicated.    -------    The following portions of the patient's history were reviewed and updated as appropriate: allergies, current medications, past family history, past medical history, past social history, past surgical history and problem list.    Allergies   Allergen Reactions   • Liver Nausea And Vomiting   • Adhesive Tape Other (See Comments)     Skin tear         Current Facility-Administered Medications:   •  sodium chloride 0.9 % flush 10 mL, 10 mL, Intravenous, Q12H, Rodrigo Pelayo MD  •  sodium chloride 0.9 % flush 10 mL, 10 mL, Intravenous, PRN, Rodrigo Pelayo MD    No current facility-administered medications on file prior to encounter.     Current Outpatient Medications on File Prior to Encounter   Medication Sig Dispense Refill   • APPLE CIDER VINEGAR PO Take  by mouth Every Other Day.     • aspirin 81 MG EC tablet Take 81 mg by mouth Daily.     • Calcium Carb-Cholecalciferol (CALCIUM + D3) 600-200 MG-UNIT tablet      • cetirizine (zyrTEC) 10 MG tablet Take 10 mg by mouth Daily.  0   • cyclobenzaprine (FLEXERIL) 5 MG tablet Take 1 tablet by mouth 2 (Two) Times a Day As Needed for Muscle Spasms. 60 tablet 1   • Diclofenac Sodium (VOLTAREN) 1 % gel gel APPLY 1 GRAM ONTO THE SKIN  EVERY 6 HOURS AS NEEDED     • gemfibrozil (LOPID) 600 MG tablet      • meclizine (ANTIVERT) 25 MG tablet Take 25 mg by mouth Every 8 (Eight) Hours As Needed.     • Multiple Vitamins-Minerals (MULTIVITAMIN WOMEN 50+) tablet Take 1 tablet by mouth Daily.     • Omega-3 Fatty Acids (FISH OIL ODOR-LESS) 1200 MG capsule Take 1 capsule by mouth Daily.     • Probiotic Product (PROBIOTIC-10 PO) Take  by mouth Every Other Day.     • Red Yeast Rice Extract (RED YEAST RICE PO) Take  by mouth Every Other Day.     • TURMERIC PO Take  by mouth Every Other Day.     • VITAMIN C 500 MG tablet      • chlorthalidone (HYGROTON) 25 MG tablet Take 25 mg by mouth Daily.  0   • docusate sodium (COLACE) 100 MG capsule Take 1 capsule by mouth 2 (Two) Times a Day As Needed for Constipation. 60 capsule 2   • FREESTYLE LITE test strip USE TO CHECK BLOOD GLUCOSE QD  3   • glucose blood test strip USE TO CHECK BLOOD GLUCOSE QD     • [DISCONTINUED] FOLIC ACID PO Take  by mouth Daily.     • [DISCONTINUED] losartan (COZAAR) 100 MG tablet Take  by mouth Daily.  0   • [DISCONTINUED] traZODone (DESYREL) 50 MG tablet      • [DISCONTINUED] VASCEPA 1 g capsule capsule TK 2 CS PO BID         Patient Active Problem List   Diagnosis   • Herniation of lumbar intervertebral disc with radiculopathy   • DDD (degenerative disc disease), lumbar   • Chronic midline low back pain with right-sided sciatica   • Lumbar spondylolysis   • Other chronic pain   • Chronic pain of both knees   • Encounter for long-term (current) use of high-risk medication   • DDD (degenerative disc disease), cervical   • Lumbar facet arthropathy       Past Medical History:   Diagnosis Date   • Diabetes mellitus (CMS/[facility]) 01/15/2002    DR DORMAN   • H/O degenerative disc disease    • Hypertension 01/15/2003    DR DORMAN   • Kidney stone    • OA (osteoarthritis)    • Stress fracture of foot     left foot       Past Surgical History:   Procedure Laterality Date   • ANKLE SURGERY Right  "01/15/1987    ANKLE RECONSTRUCTION NO COMPLICATIONS   • CHOLECYSTECTOMY  03/12/2020   • COLONOSCOPY      X2 NO DATES LISTED NO COMPLICATIONS   • HERNIA REPAIR  01/15/2008    NO COMPLICATIONS   • HYSTERECTOMY  01/15/1992    NO COMPLICATIONS   • REPLACEMENT TOTAL KNEE Right 07/18/2019       Family History   Problem Relation Age of Onset   • Cervical cancer Mother    • Heart attack Mother    • COPD Father    • Cirrhosis Father    • Breast cancer Maternal Grandmother    • Heart attack Maternal Grandmother    • No Known Problems Maternal Grandfather    • Cirrhosis Paternal Grandmother    • Emphysema Paternal Grandmother    • No Known Problems Paternal Grandfather        Social History     Socioeconomic History   • Marital status:      Spouse name: Not on file   • Number of children: 3   • Years of education: 14   • Highest education level: Not on file   Tobacco Use   • Smoking status: Never Smoker   • Smokeless tobacco: Never Used   Substance and Sexual Activity   • Alcohol use: No   • Drug use: No   • Sexual activity: Defer       -------       Review of Systems  No Fever, No Chills, No ear pain, No sinus pressure or drainage, No eye pain or drainage, No cough, No SOB, No chest tightness nor chest pain, no palpitations.          Vitals:    08/19/21 1021   BP: 166/73   BP Location: Left arm   Patient Position: Lying   Pulse: 87   Resp: 17   Temp: 97.3 °F (36.3 °C)   TempSrc: Temporal   SpO2: 99%   Weight: 117 kg (258 lb)   Height: 167.6 cm (66\")         Objective   Physical Exam  VSS, NNR, NCAT, NMNA, NRD, AAOx3.    Confirmdd some low back tenderness across the mid lower lumbar facets with pain on extension and lumbar loading.  -------    Assessment & Plan:  - as noted above, the stated intervention is indicated  - Follow-up plan will be noted in the operative report        We will have her follow back in a few weeks and see where she stands      EMR Dragon/Transcription disclaimer:   Typed items in this encounter " note may have been created by electronic transcription/translation software which converts spoken language to printed text. The electronic translation of spoken language may permit erroneous, or at times, nonsensical words or phrases to be inadvertently transcribed; Although I have reviewed the note for such errors, some may still exist.

## 2021-08-19 ENCOUNTER — HOSPITAL ENCOUNTER (OUTPATIENT)
Facility: SURGERY CENTER | Age: 61
Setting detail: HOSPITAL OUTPATIENT SURGERY
Discharge: HOME | End: 2021-08-19
Attending: ANESTHESIOLOGY | Admitting: ANESTHESIOLOGY

## 2021-08-19 ENCOUNTER — HOSPITAL ENCOUNTER (OUTPATIENT)
Dept: GENERAL RADIOLOGY | Facility: SURGERY CENTER | Age: 61
Setting detail: HOSPITAL OUTPATIENT SURGERY
End: 2021-08-19

## 2021-08-19 VITALS
DIASTOLIC BLOOD PRESSURE: 90 MMHG | BODY MASS INDEX: 41.46 KG/M2 | HEIGHT: 66 IN | HEART RATE: 76 BPM | RESPIRATION RATE: 20 BRPM | OXYGEN SATURATION: 100 % | WEIGHT: 258 LBS | SYSTOLIC BLOOD PRESSURE: 162 MMHG | TEMPERATURE: 96.9 F

## 2021-08-19 DIAGNOSIS — Z41.9 SURGERY, ELECTIVE: ICD-10-CM

## 2021-08-19 DIAGNOSIS — M47.816 LUMBAR FACET ARTHROPATHY: ICD-10-CM

## 2021-08-19 PROCEDURE — 25010000002 MIDAZOLAM PER 1 MG: Performed by: ANESTHESIOLOGY

## 2021-08-19 PROCEDURE — 99152 MOD SED SAME PHYS/QHP 5/>YRS: CPT | Performed by: ANESTHESIOLOGY

## 2021-08-19 PROCEDURE — 76000 FLUOROSCOPY <1 HR PHYS/QHP: CPT

## 2021-08-19 PROCEDURE — 64635 DESTROY LUMB/SAC FACET JNT: CPT | Performed by: ANESTHESIOLOGY

## 2021-08-19 PROCEDURE — 64493 INJ PARAVERT F JNT L/S 1 LEV: CPT | Performed by: ANESTHESIOLOGY

## 2021-08-19 PROCEDURE — 64494 INJ PARAVERT F JNT L/S 2 LEV: CPT | Performed by: ANESTHESIOLOGY

## 2021-08-19 PROCEDURE — 25010000002 FENTANYL CITRATE (PF) 50 MCG/ML SOLUTION: Performed by: ANESTHESIOLOGY

## 2021-08-19 PROCEDURE — BR16ZZZ FLUOROSCOPY OF LUMBAR FACET JOINT(S): ICD-10-PCS | Performed by: ANESTHESIOLOGY

## 2021-08-19 PROCEDURE — 3E0T3TZ INTRODUCTION OF DESTRUCTIVE AGENT INTO PERIPHERAL NERVES AND PLEXI, PERCUTANEOUS APPROACH: ICD-10-PCS | Performed by: ANESTHESIOLOGY

## 2021-08-19 PROCEDURE — 64636 DESTROY L/S FACET JNT ADDL: CPT | Performed by: ANESTHESIOLOGY

## 2021-08-19 RX ORDER — SODIUM CHLORIDE 0.9 % (FLUSH) 0.9 %
10 SYRINGE (ML) INJECTION EVERY 12 HOURS SCHEDULED
Status: DISCONTINUED | OUTPATIENT
Start: 2021-08-19 | End: 2021-08-19 | Stop reason: HOSPADM

## 2021-08-19 RX ORDER — FENTANYL CITRATE 50 UG/ML
INJECTION, SOLUTION INTRAMUSCULAR; INTRAVENOUS AS NEEDED
Status: DISCONTINUED | OUTPATIENT
Start: 2021-08-19 | End: 2021-08-19 | Stop reason: HOSPADM

## 2021-08-19 RX ORDER — SODIUM CHLORIDE 0.9 % (FLUSH) 0.9 %
10 SYRINGE (ML) INJECTION AS NEEDED
Status: DISCONTINUED | OUTPATIENT
Start: 2021-08-19 | End: 2021-08-19 | Stop reason: HOSPADM

## 2021-08-19 RX ORDER — MIDAZOLAM HYDROCHLORIDE 1 MG/ML
INJECTION INTRAMUSCULAR; INTRAVENOUS AS NEEDED
Status: DISCONTINUED | OUTPATIENT
Start: 2021-08-19 | End: 2021-08-19 | Stop reason: HOSPADM

## 2021-08-19 NOTE — OP NOTE
Bilateral L2-4 Lumbar Medial Branch RADIOFREQUENCY  [facility]      PREOPERATIVE DIAGNOSIS:  Lumbar spondylosis without myelopathy    POSTOPERATIVE DIAGNOSIS:  Lumbar spondylosis without myelopathy    PROCEDURE:   Diagnostic Bilateral Lumbar Medial Branch Nerve thermal radiofrequency lesioning, with fluoroscopy:  L2, L3, L4 nerves (at the L3, L4, L5 transverse processes) to thermally treat the innervation to facet joints L3-4, L4-5.  1. 71549-83 -- Bilateral L/S facet neuro destr., 1st Level  2. 86497-12 -- Bilateral L/S facet neuro destr., 2nd  Level       PRE-PROCEDURE DISCUSSION WITH PATIENT:    Risks and complications were discussed with the patient prior to starting the procedure and informed consent was obtained.      SURGEON:  Rodrigo Pelayo MD    REASON FOR PROCEDURE:      Previous diagnostic positivity of branch blockades at these same levels is noted.      SEDATION:  Versed 2mg & Fentanyl 100 mcg IV  TIME OF PROCEDURE:   The intraoperative procedure time after administration of the sedative was 19 minutes.       ANESTHETIC:  Lidocaine 2%  STEROID:  NONE      DESCRIPTON OF PROCEDURE:  After obtaining informed consent, IV access was obtained in the preoperative area.   The patient was taken to the operating room.  The patient was placed in the prone position with a pillow under the abdomen. All pressure points were well padded.  EKG, blood pressure, and pulse oximeter were monitored.  The patient was monitored and sedated by the RN under my direction. The lumbosacral area was prepped with Chloraprep and draped in a sterile fashion.     Under fluoroscopic guidance the transverse processes of the L3, L4, and L5 vertebrae at the junctions of the superior articular processes were identified on the right.     Skin and subcutaneous tissue were anesthetized with 1ml of 1% lidocaine above each of these points. Then, radiofrequency probe needles were advanced in this fluoro view to the above  junctions.  Aspiration was negative for blood and CSF.  After confirming the position of the needle with fluoroscope in all views, testing was initiated.  First, sensory testing was started on each needle a 1V and 50Hz and slowly decreased until painful pressure stimulation diminished at 0.5V.  Next, motor testing was confirmed to be negative at 3V and 2Hz for any radicular stimulation.  Then 1mL of the local anesthetic solution was instilled in each needle.  Two minutes elapsed, and during this time a lateral fluoroscopic view was confirmed again to ensure the needles had not advanced nor retracted.  Then, Radiofrequency Lesioning was initiated for 1.5 minutes at 85 degrees Celsius.  Needles were removed intact from each of the areas.     A similar procedure was repeated to address the L2, L3, and L4 nerves on the contralateral side.   Onset of analgesia was noted.  Vital signs remained stable throughout.      ESTIMATED BLOOD LOSS:  <5 mL  SPECIMENS:  none    COMPLICATIONS:   No complications were noted. and The patient did not have any signs of postprocedure numbness nor weakness.    TOLERANCE & DISCHARGE CONDITION:    The patient tolerated the procedure well.  The patient was transported to the recovery area without difficulties.  The patient was discharged to home under the care of family in stable and satisfactory condition.    PLAN OF CARE:  1. The patient was given our standard instruction sheet.  2. The patient will  Return to clinic 1-2 wks.  3. The patient will resume all medications as per the medication reconciliation sheet.

## 2021-08-19 NOTE — DISCHARGE INSTRUCTIONS
You need to get the Covid Vaccine:    - it is safe and effective  - there is no scientific reason to not get it  - you put other people at risk with your decision to not protect yourself           [facility] - Post-procedure Instructions          --  While there are no absolute restrictions, it is recommended that you do not perform strenuous activity today. In the morning, you may resume your level of activity as before your block.    --  If you have a band-aid at your injection site, please remove it later today. Observe the area for any redness, swelling, pus-like drainage, or a temperature over 101°. If any of these symptoms occur, please call your doctor at [phone]. If after office hours, leave a message and the on-call provider will return your call.    --  Ice may be applied to your injection site. It is recommended you avoid direct heat (heating pad; hot tub) for 1-2 days.    --  Call [facility] at [phone] if you experience persistent headache, persistent bleeding from the injection site, or severe pain not relieved by heat or oral medication.    --  Do not make important decisions today.    --  Due to the effects of the block and/or the I.V. Sedation, DO NOT drive or operate hazardous machinery for 12 hours.  Local anesthetics may cause numbness after procedure and precautions must be taken with regards to operating equipment as well as with walking, even if ambulating with assistance of another person or with an assistive device.    --  Do not drink alcohol for 12 hours.    -- You may return to work tomorrow, or as directed by your referring doctor.    --  Occasionally you may notice a slight increase in your pain after the procedure. This should start to improve within the next 24-48 hours. Radiofrequency ablation procedure pain may last 3-4 weeks.    --  It may take as long as 3-4 days before you notice a gradual improvement in your pain and/or other symptoms.    -- You may  continue to take your prescribed pain medication as needed.    --  Some normal possible side effects of steroid use could include fluid retention, increased blood sugar, dull headache, increased sweating, increased appetite, mood swings and flushing.    --  Diabetics are recommended to watch their blood glucose level closely for 24-48 hours after the injection.    --  Must stay in PACU for 20 min upon arrival and prove no leg weakness before being discharged.    --  IN THE EVENT OF A LIFE THREATENING EMERGENCY, (CHEST PAIN, BREATHING DIFFICULTIES, PARALYSIS…) YOU SHOULD GO TO YOUR NEAREST EMERGENCY ROOM.    --  You should be contacted by our office within 2-3 days to schedule follow up or next appointment date.  If not contacted within 7 days, please call the office at [phone]

## 2021-08-31 RX ORDER — TRAMADOL HYDROCHLORIDE 50 MG/1
50 TABLET ORAL DAILY PRN
Qty: 30 TABLET | OUTPATIENT
Start: 2021-08-31

## 2021-09-02 ENCOUNTER — OFFICE VISIT (OUTPATIENT)
Dept: PAIN MEDICINE | Facility: CLINIC | Age: 61
End: 2021-09-02

## 2021-09-02 VITALS
TEMPERATURE: 97.5 F | HEIGHT: 66 IN | DIASTOLIC BLOOD PRESSURE: 87 MMHG | SYSTOLIC BLOOD PRESSURE: 175 MMHG | BODY MASS INDEX: 43.71 KG/M2 | OXYGEN SATURATION: 98 % | HEART RATE: 90 BPM | WEIGHT: 272 LBS | RESPIRATION RATE: 20 BRPM

## 2021-09-02 DIAGNOSIS — G89.29 CHRONIC PAIN OF BOTH KNEES: ICD-10-CM

## 2021-09-02 DIAGNOSIS — M25.562 CHRONIC PAIN OF BOTH KNEES: ICD-10-CM

## 2021-09-02 DIAGNOSIS — M51.36 DDD (DEGENERATIVE DISC DISEASE), LUMBAR: ICD-10-CM

## 2021-09-02 DIAGNOSIS — M47.812 CERVICAL FACET JOINT SYNDROME: ICD-10-CM

## 2021-09-02 DIAGNOSIS — M25.561 CHRONIC PAIN OF BOTH KNEES: ICD-10-CM

## 2021-09-02 DIAGNOSIS — M50.30 DDD (DEGENERATIVE DISC DISEASE), CERVICAL: ICD-10-CM

## 2021-09-02 DIAGNOSIS — Z79.899 ENCOUNTER FOR LONG-TERM (CURRENT) USE OF HIGH-RISK MEDICATION: ICD-10-CM

## 2021-09-02 DIAGNOSIS — G89.29 OTHER CHRONIC PAIN: Primary | ICD-10-CM

## 2021-09-02 PROCEDURE — 99214 OFFICE O/P EST MOD 30 MIN: CPT | Performed by: NURSE PRACTITIONER

## 2021-09-02 RX ORDER — CYCLOBENZAPRINE HCL 5 MG
5 TABLET ORAL 2 TIMES DAILY PRN
Qty: 60 TABLET | Refills: 1 | Status: SHIPPED | OUTPATIENT
Start: 2021-09-02 | End: 2022-08-16 | Stop reason: SDUPTHER

## 2021-09-02 RX ORDER — TRAMADOL HYDROCHLORIDE 50 MG/1
50 TABLET ORAL DAILY PRN
Qty: 30 TABLET | Refills: 0 | Status: SHIPPED | OUTPATIENT
Start: 2021-09-02 | End: 2021-12-20 | Stop reason: SDUPTHER
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
## 2021-10-25 ENCOUNTER — TELEPHONE (OUTPATIENT)
Dept: PAIN MEDICINE | Facility: CLINIC | Age: 61
End: 2021-10-25

## 2021-10-25 NOTE — TELEPHONE ENCOUNTER
Caller: GILBERTO COSBY    Relationship to patient: SELF     Best call back number: [phone]    Type of visit: 2 MONTH FOLLOW UP    Requested date: ANY DAY THE WEEK OF 11/8    If rescheduling, when is the original appointment: 11/2    Additional notes: PT WILL BE OUT OF TOWN, [facility] COULD FIND WAS EARLY DECEMBER

## 2021-12-20 ENCOUNTER — OFFICE VISIT (OUTPATIENT)
Dept: PAIN MEDICINE | Facility: CLINIC | Age: 61
End: 2021-12-20

## 2021-12-20 VITALS
BODY MASS INDEX: 44.2 KG/M2 | TEMPERATURE: 97.1 F | HEART RATE: 87 BPM | SYSTOLIC BLOOD PRESSURE: 168 MMHG | WEIGHT: 275 LBS | RESPIRATION RATE: 20 BRPM | DIASTOLIC BLOOD PRESSURE: 90 MMHG | OXYGEN SATURATION: 100 % | HEIGHT: 66 IN

## 2021-12-20 DIAGNOSIS — Z79.899 ENCOUNTER FOR LONG-TERM (CURRENT) USE OF HIGH-RISK MEDICATION: ICD-10-CM

## 2021-12-20 DIAGNOSIS — M47.816 LUMBAR FACET ARTHROPATHY: ICD-10-CM

## 2021-12-20 DIAGNOSIS — M25.561 CHRONIC PAIN OF BOTH KNEES: ICD-10-CM

## 2021-12-20 DIAGNOSIS — G89.29 OTHER CHRONIC PAIN: Primary | ICD-10-CM

## 2021-12-20 DIAGNOSIS — M25.562 CHRONIC PAIN OF BOTH KNEES: ICD-10-CM

## 2021-12-20 DIAGNOSIS — M51.36 DDD (DEGENERATIVE DISC DISEASE), LUMBAR: ICD-10-CM

## 2021-12-20 DIAGNOSIS — G89.29 CHRONIC PAIN OF BOTH KNEES: ICD-10-CM

## 2021-12-20 PROCEDURE — 99214 OFFICE O/P EST MOD 30 MIN: CPT | Performed by: NURSE PRACTITIONER

## 2021-12-20 RX ORDER — TRAMADOL HYDROCHLORIDE 50 MG/1
50 TABLET ORAL DAILY PRN
Qty: 30 TABLET | Refills: 0 | Status: SHIPPED | OUTPATIENT
Start: 2021-12-20 | End: 2022-02-07

## 2021-12-20 NOTE — PROGRESS NOTES
CHIEF COMPLAINT  F/u back and joint pain. Pt had RFA 8/19/21 and still reports no relief from procedure. Pt sts pain has worsened.     Subjective   Damaris Williamson is a 61 y.o. female  who presents for follow-up.  She has a history of chronic back, neck and joint pain. Reports her pain is worse since last evaluation.    Complains of pain in her low back and left knee. Today her pain is 7/10VAS. Her primary complaint is her low back pain.  Describes the pain as continuous throbbing and aching.  Pain increases with activity, household chores, prolonged standing/walking; pain decreases with medication and rest. Continues with Tramadol 50 mg 1-2/day and flexeril 5 mg 1/day PRN. Also takes Blair back and Body. Had to stop ibuprofen and naproxen due to liver function elevations.  Also using Voltaren gel and CBD gel her knee.  Denies any side effects from the regimen except constipation. Takes OTC's with positive results. Does have a history of IBS but generally has constipation issues.    The regimen helps decrease her pain modreately.  ADL's by self. Denies any bowel or bladder incontinence. She later states she has intermittent urinary incontinence with sneezing and laughing.     Patient had a bilateral L2-L4 RFA performed by Dr. COLLINS on 8-19-21 for management of LOW BACK PAIN. Patient reports no current relief from the procedure.    Dr Mejias is orthopedist. Was going to have  Left knee replacement, but most recent xrays showed bone spurs and insurance denied the replacement. Is pending follow-up. Had to stop steroid injections for this because of increased BP and blood sugars.     Last refill of Tramadol was 9-2-21.    Patient remained masked during entire encounter. No cough present. I donned a mask and eye protection throughout entire visit. Prior to donning mask and eye protection, hand hygiene was performed, as well as when it was doffed.  I was closer than 6 feet, but not for an extended period of time. No  obvious exposure to any bodily fluids.    Back Pain  This is a chronic problem. The current episode started more than 1 year ago. The problem occurs constantly. The problem has been gradually worsening (worse since last evaluation) since onset. The pain is present in the lumbar spine and sacro-iliac. The quality of the pain is described as aching and shooting. The pain radiates to the left thigh. The pain is at a severity of 7/10. The pain is the same all the time. The symptoms are aggravated by bending, position, sitting, standing and twisting. Stiffness is present in the morning. Associated symptoms include bladder incontinence (unchanged), bowel incontinence (unchanged), headaches (occ), leg pain, paresis, paresthesias and tingling. Pertinent negatives include no abdominal pain, chest pain, dysuria, fever, numbness, pelvic pain, perianal numbness, weakness or weight loss. Risk factors include obesity, menopause, lack of exercise, poor posture and sedentary lifestyle. She has tried analgesics, muscle relaxant and NSAIDs for the symptoms.   Joint Pain  This is a chronic problem. The current episode started more than 1 year ago. The problem occurs constantly. The problem has been gradually worsening. Associated symptoms include arthralgias (joint), fatigue (occ), headaches (occ) and neck pain. Pertinent negatives include no abdominal pain, chest pain, chills, congestion, coughing, fever, nausea, numbness or weakness. Joint swelling: right knee.      Has seen rheum. No inflammatory arthritis.    She is s/p right TKR on 7-18-19(Dr MAST).  Past pain medications: states some strong pain medicines in the beginning     Current pain medications:   Flexeril 5 mg prn - sparingly  Ibuprofen/Naproxen- alternates  Tramadol - takes almost daily      Past therapies:  Physical Therapy: yes-- no relief  Chiropractor: yes- pt states she had a family member who was a chiropractor and he would adjust her for free but he has since  passed away.   Massage Therapy: no  TENS: yes  Neck or back surgery: no  Past pain management: yes     Previous Injections: bilateral L2-L4 RFA 8-19-21  Effect of Injection (%): 0  Length of Relief: 0    Previous Injections: TF BRYCE left L4 10-6-20  Effect of Injection (%): 50%  Length of Relief: ONGOING     Previous Injections: bilateral L2-L4 RFL 6-8-20  Effect of Injection (%): 85-90%  Length of Relief: ongoing     Previous Injection: Bilateral L2-L5 RFA - 1/16/19 and 2/11/19      Previous Injection: Bilateral L2-5 Lumbar Medial Branch Blockade on 8/22/18 - 5/2/2018  Effect of Injection (%): 50%     Length of Relief: 3-months     Previous Injections: yes at previous pain management, last one maybe [id number]  Effect of Injection (%): 50%  Length of Relief: couple of months    Narrative & Impression   MRI OF THE LUMBAR SPINE WITHOUT CONTRAST 09/17/2020     CLINICAL HISTORY: Low back pain for greater than 6 weeks with  conservative treatment. The patient's symptoms are persistent and  progressive and the patient is a surgical candidate. Patient has lumbar  radiculopathy, lumbar spondylosis, degenerative disc disease in the  lumbar spine.     TECHNIQUE: Sagittal T1, proton density and fat-suppressed T2-weighted  images were obtained of the lumbar spine. In addition axial T2-weighted  images were obtained from L1 to S1, thin cut axial T1-weighted images  were obtained angled through the interspaces from L3 to S1.      COMPARISON: This is correlated to a prior MRI of the lumbar spine from  [facility] on 04/02/2018.           FINDINGS: The distal thoracic cord and the conus is normal in signal  intensity. The conus terminates at the T12-L1 interspace level which is  normal.      The T11-12 and T12-L1 disc spaces are not evaluated on the axial images  but based on the sagittal images the disc space and facets at these  levels are normal with no canal or foraminal narrowing at T11-12 or  T12-L1.      At L1-2  posterior disc margin and facets are normal with no canal or  foraminal narrowing.      At L2-3 there is minimal disc desiccation. The posterior disc margin is  normal. There is mild bilateral facet overgrowth .There is no canal or  foraminal narrowing.      At L3-4 there is mild bilateral facet overgrowth, mild disc desiccation.  The posterior disc margin is normal .There is no canal or right  foraminal narrowing. There is minimal spurring and bulging disc material  into the inferior left foramen into the far left laterally mildly  narrowing the left foramen.      At L4-5 there is mild-to-moderate bilateral facet overgrowth. The  posterior disc margin is normal. There is no canal or right foraminal  narrowing. There is minimal spurring into the left foramen and minimal  left foraminal narrowing.      At L5-S1 there is mild bilateral facet overgrowth. There is minimal disc  desiccation. There is right paracentral with posterior lateral annular  tear with a small right paracentral disc protrusion that abuts the  anterior medial aspect of the traversing right S1 nerve root. It does  not compress or displace it. There is no significant canal or lateral  recess or foraminal narrowing.     There is some minimal T2 hyperintensity in the left posterior  paravertebral musculature from L4 to S1 which is nonspecific and can be  seen with some denervation atrophy or prior muscle strain.     IMPRESSION:  1. There is very mild lumbar spondylosis as described.  2. Posterior disc margin and facets are normal with no canal or  foraminal narrowing from T11-L2. L2-3 there is mild disc desiccation and  mild bilateral facet overgrowth but no canal or foraminal narrowing at  L2-3.  3. At L3-4 there is mild bilateral facet overgrowth. The posterior disc  margin is normal with no canal or right foraminal narrowing. There is  minimal spurring and bulging disc material into the inferior left  foramen and to the far left laterally mild left  foraminal narrowing.   4. At L4-5 there is mild-to-moderate bilateral facet overgrowth but the  posterior disc margin is normal with no canal or right foraminal  narrowing there. There is some mild spurring, bulging disc material into  the inferior left foramen and to the far left laterally mildly narrowing  the left foramen abutting the exiting left L4 nerve root.   5. At L5-S1 there is mild bilateral facet overgrowth. There is a right  paracentral annular tear with a right paracentral to posterior lateral  disc protrusion or tiny disc herniation that abuts the anterior medial  aspect of the traversing right S1 nerve root. It does not displace or  appear to compress the right S1 nerve root and similar to prior MRI  04/02/2018.  5. There is some faint T2 high signal in the left posterior  paravertebral musculature from L4 to S1 that is nonspecific but if  anything there is some minimal volume loss that may be due to some  minimal denervation atrophic changes and this is a new finding when  compared to prior MRI 04/02/2018. The remainder of the lumbar spine MRI  is unremarkable.      This report was finalized on 9/18/2020 6:29 AM by Dr. Esa Coffman M.D.         PEG Assessment   What number best describes your pain on average in the past week?7  What number best describes how, during the past week, pain has interfered with your enjoyment of life?7  What number best describes how, during the past week, pain has interfered with your general activity?  7    The following portions of the patient's history were reviewed and updated as appropriate: allergies, current medications, past family history, past medical history, past social history, past surgical history and problem list.    Review of Systems   Constitutional: Positive for activity change (dec) and fatigue (occ). Negative for chills, fever and weight loss.   HENT: Negative for congestion.    Respiratory: Positive for shortness of breath (occ w activity). Negative  "for cough.    Cardiovascular: Negative for chest pain.   Gastrointestinal: Positive for bowel incontinence (unchanged) and constipation (occ on colace). Negative for abdominal pain, diarrhea and nausea.   Genitourinary: Positive for bladder incontinence (unchanged). Negative for difficulty urinating, dysuria and pelvic pain.   Musculoskeletal: Positive for arthralgias (joint), back pain, gait problem (cane) and neck pain. Joint swelling: right knee.   Neurological: Positive for tingling, headaches (occ) and paresthesias. Negative for dizziness, weakness, light-headedness and numbness.   Psychiatric/Behavioral: Positive for sleep disturbance (occ). Negative for agitation and suicidal ideas. The patient is not nervous/anxious.      I have reviewed and confirmed the accuracy of the ROS as documented by the MA/LPN/RN CARLITA Kaur      Vitals:    12/20/21 0808   BP: 168/90   Pulse: 87   Resp: 20   Temp: 97.1 °F (36.2 °C)   SpO2: 100%   Weight: 125 kg (275 lb)   Height: 167.6 cm (66\")   PainSc:   7   PainLoc: Back  Comment: AND JOINTS         Objective   Physical Exam  Vitals and nursing note reviewed.   Constitutional:       Appearance: Normal appearance. She is well-developed.   HENT:      Head: Normocephalic and atraumatic.   Eyes:      General: Lids are normal.   Musculoskeletal:      Cervical back: Pain with movement, spinous process tenderness and muscular tenderness present. Decreased range of motion.      Lumbar back: Tenderness and bony tenderness (minimal lumbar facet tenderness) present. Decreased range of motion.      Comments: Widespread OA changes with no acute synovitis    Moderate tenderness of left SI joint, minimal of right   Skin:     General: Skin is warm and dry.   Neurological:      Mental Status: She is alert.      Gait: Gait abnormal (cane).   Psychiatric:         Speech: Speech normal.         Behavior: Behavior normal. Behavior is cooperative.         Thought Content: Thought content " normal.         Judgment: Judgment normal.         Assessment/Plan   Diagnoses and all orders for this visit:    1. Other chronic pain (Primary)    2. DDD (degenerative disc disease), lumbar  -     MRI Lumbar Spine Without Contrast; Future  -     Ambulatory Referral to Neurosurgery    3. Chronic pain of both knees    4. Encounter for long-term (current) use of high-risk medication    5. Lumbar facet arthropathy  -     MRI Lumbar Spine Without Contrast; Future  -     Ambulatory Referral to Neurosurgery      --- The urine drug screen confirmation from 7-14-21(last refill was 3-23-21) has been reviewed and the result is APPROPRIATE based on patient history and ANT report  --- The patient signed an updated copy of the controlled substance agreement on 12-20-21  --- A medication management agreement is signed by the patient and the provider today.  Reviewed with the patient that this contract is specific to controlled substances, specifically pain medication.  Reviewed core of pain medicine is to decrease pain and increase functional level.  Reviewed the medication must be picked up as a written prescription from this office and will not be called into any pharmacy.  Reviewed the use of Ant within the office setting.  Reviewed causes for potential of discontinuation of opiates,  including but not limited to consideration for diversion, obtaining other controlled substances from other license practitioners, or  inappropriate office behavior.  Patient understands to use a controlled substance as prescribed by physician and to avoid improper use of controlled substances.  Patient will also verbalized understanding that prescriptions to be filled at the same pharmacy  unless office staff is made aware of this.  Patient understands they can be submitted to random urine drug screens and/or random pill counts on any request.  Patient understands they are not to receive early refills.  The patient must produce an official  police report for any effort to replace controlled substances that are lost or stolen.  No use of illegal street drugs while receiving controlled substances from this prescribing provider.  The patient is to take medication as prescribed  and not deviate from the normal schedule without consultation from the provider.  Patient is not to share the medication with others or to take medication with alcohol or other sedatives.  Use caution when driving or operating machinery.  Alert office if the patient becomes pregnant or begins nursing a child.  Reviewed the use of controlled substances recreates a risk for respiratory depression, which may result in serious harm or even death.  Must always keep the prescription in the original container.  Patient is to store controlled substances in a locked cabinet or other secure storage unit that is cool, dry and out of sunlight.  Patient must immediately notify office if any controlled substances is stolen or improperly taken by another individual.  Reviewed risk for physical dependence, tolerance and addiction.  --- MRI- Lumbar  --- Referral to Dr Merrill  --- Refill Tramadol.  --- Consider left SI joint injection. Patient wants to wait at this time due to steroid exposures.  --- Continue with Dr saba as planned.   --- Follow-up 2 months or sooner if needed           ANT REPORT  As part of the patient's treatment plan, I am prescribing controlled substances. The patient has been made aware of appropriate use of such medications, including potential risk of somnolence, limited ability to drive and/or work safely, and the potential for dependence or overdose. It has also bee made clear that these medications are for use by this patient only, without concomitant use of alcohol or other substances unless prescribed.     Patient has completed prescribing agreement detailing terms of continued prescribing of controlled substances, including monitoring ANT reports, urine drug  screening, and pill counts if necessary. The patient is aware that inappropriate use will results in cessation of prescribing such medications.    As the clinician, I personally reviewed the ANT from 12-20-21 while the patient was in the office today.    History and physical exam exhibit continued safe and appropriate use of controlled substances.       Dictated utilizing Dragon dictation.     This document is intended for medical expert use only. Reading of this document by patients and/or patient's family without participating medical staff guidance may result in misinterpretation and unintended morbidity.   Any interpretation of such data is the responsibility of the patient and/or family member responsible for the patient in concert with their primary or specialist providers, not to be left for sources of online searches such as [employer], [employer] or similar queries. Relying on these approaches to knowledge may result in misinterpretation, misguided goals of care and even death should patients or family members try recommendations outside of the realm of professional medical care in a supervised way.

## 2022-01-23 ENCOUNTER — HOSPITAL ENCOUNTER (OUTPATIENT)
Dept: MRI IMAGING | Facility: HOSPITAL | Age: 62
Discharge: HOME | End: 2022-01-23
Admitting: NURSE PRACTITIONER

## 2022-01-23 DIAGNOSIS — M51.36 DDD (DEGENERATIVE DISC DISEASE), LUMBAR: ICD-10-CM

## 2022-01-23 DIAGNOSIS — M47.816 LUMBAR FACET ARTHROPATHY: ICD-10-CM

## 2022-01-23 PROCEDURE — 72148 MRI LUMBAR SPINE W/O DYE: CPT

## 2022-01-24 ENCOUNTER — TELEPHONE (OUTPATIENT)
Dept: PAIN MEDICINE | Facility: CLINIC | Age: 62
End: 2022-01-24

## 2022-01-24 NOTE — PROGRESS NOTES
Reviewed lumbar MRI. Shows a left disc protrusion at L4-5. Also shows facet degeneration, which is what we tried the RFA for. We could try an epidural towards that left disc protrusion. Also, can still plan on seeing Dr Merrill as requested by patient. Thanks. CHARLES

## 2022-01-24 NOTE — TELEPHONE ENCOUNTER
Caller: Damaris Williamson    Relationship to patient: Self    Best call back number:[phone]  Patient is needing: CALLBACK - PATIENT RETURNING CALL TO [facility]      UNABLE TO WARM TRANSFER

## 2022-01-25 NOTE — PROGRESS NOTES
Ok. That's fine. Let me know if she changes her mind or anything. Otherwise will see her for routine follow up. Thanks. CHARLES

## 2022-01-25 NOTE — PROGRESS NOTES
Patient informed. She wants to think about the epidural since it effects her blood sugar and blood pressure.

## 2022-02-07 RX ORDER — TRAMADOL HYDROCHLORIDE 50 MG/1
50 TABLET ORAL DAILY PRN
Qty: 30 TABLET | Refills: 0 | Status: SHIPPED | OUTPATIENT
Start: 2022-02-07 | End: 2022-03-30

## 2022-02-16 ENCOUNTER — OFFICE VISIT (OUTPATIENT)
Dept: PAIN MEDICINE | Facility: CLINIC | Age: 62
End: 2022-02-16

## 2022-02-16 VITALS
BODY MASS INDEX: 44.61 KG/M2 | HEIGHT: 66 IN | OXYGEN SATURATION: 99 % | SYSTOLIC BLOOD PRESSURE: 155 MMHG | HEART RATE: 85 BPM | WEIGHT: 277.6 LBS | DIASTOLIC BLOOD PRESSURE: 87 MMHG | RESPIRATION RATE: 20 BRPM | TEMPERATURE: 97.5 F

## 2022-02-16 DIAGNOSIS — M25.562 CHRONIC PAIN OF BOTH KNEES: ICD-10-CM

## 2022-02-16 DIAGNOSIS — M47.816 LUMBAR FACET ARTHROPATHY: ICD-10-CM

## 2022-02-16 DIAGNOSIS — M25.561 CHRONIC PAIN OF BOTH KNEES: ICD-10-CM

## 2022-02-16 DIAGNOSIS — M51.36 DDD (DEGENERATIVE DISC DISEASE), LUMBAR: ICD-10-CM

## 2022-02-16 DIAGNOSIS — M79.605 PAIN IN BOTH LOWER EXTREMITIES: ICD-10-CM

## 2022-02-16 DIAGNOSIS — M79.604 PAIN IN BOTH LOWER EXTREMITIES: ICD-10-CM

## 2022-02-16 DIAGNOSIS — Z79.899 ENCOUNTER FOR LONG-TERM (CURRENT) USE OF HIGH-RISK MEDICATION: ICD-10-CM

## 2022-02-16 DIAGNOSIS — R29.898 WEAKNESS OF BOTH LOWER EXTREMITIES: ICD-10-CM

## 2022-02-16 DIAGNOSIS — G89.29 CHRONIC PAIN OF BOTH KNEES: ICD-10-CM

## 2022-02-16 DIAGNOSIS — G89.29 OTHER CHRONIC PAIN: Primary | ICD-10-CM

## 2022-02-16 PROCEDURE — 99214 OFFICE O/P EST MOD 30 MIN: CPT | Performed by: NURSE PRACTITIONER

## 2022-02-16 PROCEDURE — 80305 DRUG TEST PRSMV DIR OPT OBS: CPT | Performed by: NURSE PRACTITIONER

## 2022-02-16 NOTE — PROGRESS NOTES
CHIEF COMPLAINT  FOLLOW-UP BACK AND JOINT PAIN .   Pt states her pain level has remained the same since last office visit .     Subjective   Damaris Williamson is a 61 y.o. female  who presents for follow-up.  She has a history of chronic back and joint pain. Reports her pain is UNCHANGED since last evaluation.    Complains of pain in her low back, left worse than right. Today her pain is 4/10VAS.  Describes her pain as continuous aching and throbbing with intermittent sharp and shooting pain.  Continues with Tramadol 50 mg 1-2/day and flexeril 5 mg 1/day PRN(1-2/week). Also takes [facility]. Had to stop ibuprofen and naproxen due to liver function elevations.  Also using Voltaren gel and CBD gel her knee.  Denies any side effects from the regimen except constipation. Takes OTC's(Metamucil and colace) with positive results. Does have a history of IBS but generally has constipation issues.    The regimen helps decrease her pain by 50-60%.  ADL's by self. Denies any bowel or bladder changes. She later states she has intermittent urinary incontinence with sneezing and laughing.     Patient had a bilateral L2-L4 RFA performed by Dr. COLLINS on 8-19-21 for management of LOW BACK PAIN. Patient reports no current relief from the procedure.     Dr Hsieh is orthopedist. Was going to have  Left knee replacement, but most recent xrays showed bone spurs and insurance denied the replacement. Is pending follow-up. Had to stop steroid injections for this because of increased BP and blood sugars.     Has a history of neuropathy. Under care of podiatrist.     Patient remained masked during entire encounter. No cough present. I donned a mask and eye protection throughout entire visit. Prior to donning mask and eye protection, hand hygiene was performed, as well as when it was doffed.  I was closer than 6 feet, but not for an extended period of time. No obvious exposure to any bodily fluids.    Back Pain  This is a chronic  problem. The current episode started more than 1 year ago. The problem occurs constantly. The problem has been gradually worsening (unchanged since last evaluation) since onset. The pain is present in the lumbar spine and sacro-iliac. The quality of the pain is described as aching and shooting. The pain radiates to the left thigh. The pain is at a severity of 4/10. The pain is the same all the time. The symptoms are aggravated by bending, position, sitting, standing and twisting. Stiffness is present in the morning. Associated symptoms include bladder incontinence (unchanged), bowel incontinence (unchanged), leg pain, numbness (bilateral legs), paresis, paresthesias and tingling. Pertinent negatives include no abdominal pain, chest pain, dysuria, fever, headaches, pelvic pain, perianal numbness, weakness or weight loss. Risk factors include obesity, menopause, lack of exercise, poor posture and sedentary lifestyle. She has tried analgesics, muscle relaxant and NSAIDs for the symptoms.   Joint Pain  This is a chronic problem. The current episode started more than 1 year ago. The problem occurs constantly. The problem has been gradually worsening. Associated symptoms include arthralgias (joint), fatigue, neck pain and numbness (bilateral legs). Pertinent negatives include no abdominal pain, chest pain, chills, congestion, coughing, fever, headaches, nausea or weakness. Joint swelling: right knee.      Has seen rheum. No inflammatory arthritis.    She is s/p right TKR on 7-18-19(Dr MAST).  Past pain medications: states some strong pain medicines in the beginning     Current pain medications:   Flexeril 5 mg prn - sparingly  Ibuprofen/Naproxen- alternates  Tramadol - takes almost daily      Past therapies:  Physical Therapy: yes-- no relief  Chiropractor: yes- pt states she had a family member who was a chiropractor and he would adjust her for free but he has since passed away.   Massage Therapy: no  TENS: yes  Neck or  back surgery: no  Past pain management: yes     Previous Injections: bilateral L2-L4 RFA 8-19-21  Effect of Injection (%): 0  Length of Relief: 0     Previous Injections: TF BRYCE left L4 10-6-20  Effect of Injection (%): 50%  Length of Relief: ONGOING     Previous Injections: bilateral L2-L4 RFL 6-8-20  Effect of Injection (%): 85-90%  Length of Relief: ongoing     Previous Injection: Bilateral L2-L5 RFA - 1/16/19 and 2/11/19      Previous Injection: Bilateral L2-5 Lumbar Medial Branch Blockade on 8/22/18 - 5/2/2018  Effect of Injection (%): 50%     Length of Relief: 3-months     Previous Injections: yes at previous pain management, last one maybe [id number]  Effect of Injection (%): 50%  Length of Relief: couple of months    Narrative & Impression   MRI OF THE LUMBAR SPINE WITHOUT CONTRAST 01/23/2022     CLINICAL HISTORY: Osteoarthritis lumbosacral region, spinal stenosis  lumbosacral region, low back pain extending bilaterally in the legs down  the knees.     TECHNIQUE: Sagittal T1, proton density and fat-suppressed T2-weighted  images were obtained of the lumbar spine, in addition axial T2-weighted  images were obtained from L1 to S1, thin cut axial T1-weighted images  were obtained angled through the interspaces from L3 to S1.     COMPARISON: This is correlated to prior MRI of the lumbar spine  09/17/2020.     FINDINGS: The distal thoracic cord and conus is normal in signal  intensity conus terminates at L1 lumbar level which is normal.     The T11-T12, T12-L1, and L1-L2 disc spaces and facets are normal with no  canal or foraminal narrowing from T11 to L2.     At L2-L3, the disc space is normal. There is mild bilateral facet  overgrowth. There is no canal or foraminal narrowing.     At L3-L4, there is mild bilateral facet overgrowth. There is some T2  high signal in the left foraminal to far left lateral annulus with  slight eccentric spurring and bulging disc material into the left  foramen and to the far left  laterally mildly narrowing the lateral left  foramen, spurs and bulging disc material may abut the anteromedial left  L3 nerve root lateral to left foramen. There is no central canal or  right foraminal narrowing.     At L4-L5, there is mild-to-moderate bilateral facet overgrowth.  Posterior disc margin is normal. There is no canal or right foraminal  narrowing. There is some T2 high signal in left posterior lateral to  foraminal annulus, compatible with a left posterior lateral-subarticular  and foraminal annular tear. There has been interval development of a  small left posterior lateral-subarticular disc protrusion or tiny disc  herniation measuring 4 x 3 mm in size. It abuts the ventral aspect of  the traversing left L5 nerve root, but does not displace it. There is  some minimal spurring and bulging disc material into the left foramen  with mild left foraminal narrowing.     At L5-S1, there is mild bilateral facet overgrowth. There is minimal  disc desiccation. There is a right paracentral posterior lateral annular  tear, but otherwise posterior disc margin is normal, and there is no  canal or lateral recess or foraminal narrowing.     IMPRESSION:  1. There is a new finding at the L4-5 lumbar level; otherwise, there has  been no interval change since prior MRI of the lumbar spine on  09/17/2020. At L4-5 there is mild-to-moderate bilateral facet overgrowth  and there is a left posterior lateral to foraminal annular tear. Since  prior MRI of the lumbar spine on 09/17/2020, there is been interval  development of a tiny 4 x 3 mm left posterior lateral-subarticular disc  protrusion or tiny disc herniation that abuts the ventral aspect of the  traversing left L5 nerve root slightly narrowing the left lateral recess  at L4-5 but does not displace the left L5 nerve root. There is slight  eccentric spurring and bulging disc material into left neural foramen  with stable mild left foraminal narrowing at L4-5. Otherwise  there is  been no change since prior MRI lumbar spine 09/17/2020. While there is  mild bilateral facet overgrowth at L2-L3, the remainder of the disc  space and facets are normal with no canal or foraminal narrowing from  T11 to L3.  2. The posterior disc margin and facets are normal with no canal or  foraminal narrowing from T11 to L2. At L2-3, there is mild facet  overgrowth and a minimal left foraminal disc bulge but no canal or  foraminal narrowing.  3. At L3-L4, there is a left foraminal to far left lateral annular tear  with minimal eccentric spurring and bulging disc material into the  inferior lateral left foramen and to the far left laterally mildly  narrowing the lateral aspect of the left foramen.  4. At L5-S1, there is a tiny right paracentral to posterior lateral  annular tear but no canal or foraminal narrowing. The remainder of the  lumbar spine MRI is unremarkable.     This report was finalized on 1/24/2022 8:28 AM by Dr. Esa Coffman M.D.          Imaging    MRI Lumbar Spine Without Contrast (Order: [id number]) - 1/23/2022    Result History    MRI Lumbar Spine Without Contrast (Order #[id number]) on 1/24/2022 - Order Result History Report - Result Edited    Signed    Electronically signed by Esa Coffman MD on 1/24/22 at 0828 EST     Reprint Order Requisition    MRI Lumbar Spine Without Contrast (Order #[id number]) on 1/23/22     MRI Lumbar Spine Without Contrast: Result Notes     CARLITA Conti   1/25/2022  3:55 PM EST         Ok. That's fine. Let me know if she changes her mind or anything. Otherwise will see her for routine follow up. Thanks. CHARLES Farr RN   1/25/2022  3:18 PM EST         Patient informed. She wants to think about the epidural since it effects her blood sugar and blood pressure.     Kristal Farr RN   1/24/2022 11:55 AM EST         Left message to return our call.     CARLITA Conti   1/24/2022 11:20 AM EST         Reviewed lumbar MRI.  "Shows a left disc protrusion at L4-5. Also shows facet degeneration, which is what we tried the RFA for. We could try an epidural towards that left disc protrusion. Also, can still plan on seeing Dr Merrill as requested by patient. Thanks. CHARLES         The following portions of the patient's history were reviewed and updated as appropriate: allergies, current medications, past family history, past medical history, past social history, past surgical history and problem list.    Review of Systems   Constitutional: Positive for fatigue. Negative for activity change, chills, fever and weight loss.   HENT: Negative for congestion.    Eyes: Positive for visual disturbance (blurred vision).   Respiratory: Negative for cough and chest tightness.    Cardiovascular: Negative for chest pain.   Gastrointestinal: Positive for bowel incontinence (unchanged), constipation and diarrhea. Negative for abdominal pain and nausea.   Genitourinary: Positive for bladder incontinence (unchanged). Negative for difficulty urinating, dysuria and pelvic pain.   Musculoskeletal: Positive for arthralgias (joint), back pain and neck pain. Joint swelling: right knee.   Neurological: Positive for dizziness, tingling, numbness (bilateral legs) and paresthesias. Negative for weakness, light-headedness and headaches.   Psychiatric/Behavioral: Negative for agitation, sleep disturbance and suicidal ideas. The patient is nervous/anxious.      I have reviewed and confirmed the accuracy of the ROS as documented by the MA/LPN/RN Nathaly Stewart, CARLITA      Vitals:    02/16/22 1446   BP: 155/87   Pulse: 85   Resp: 20   Temp: 97.5 °F (36.4 °C)   SpO2: 99%   Weight: 126 kg (277 lb 9.6 oz)   Height: 167.6 cm (65.98\")   PainSc:   4   PainLoc: Back  Comment: JOINT       Objective   Physical Exam  Vitals and nursing note reviewed.   Constitutional:       Appearance: Normal appearance. She is well-developed.   HENT:      Head: Normocephalic and atraumatic. "   Eyes:      General: Lids are normal.   Musculoskeletal:      Cervical back: Pain with movement, spinous process tenderness and muscular tenderness present. Decreased range of motion.      Lumbar back: Tenderness and bony tenderness (minimal lumbar facet tenderness) present. Decreased range of motion.      Comments: Widespread OA changes with no acute synovitis    Moderate tenderness of left SI joint, minimal of right   Skin:     General: Skin is warm and dry.   Neurological:      Mental Status: She is alert.      Gait: Gait abnormal (cane).   Psychiatric:         Speech: Speech normal.         Behavior: Behavior normal. Behavior is cooperative.         Thought Content: Thought content normal.         Judgment: Judgment normal.         Assessment/Plan   Diagnoses and all orders for this visit:    1. Other chronic pain (Primary)  -     Urine Drug Screen Confirmation - Urine, Clean Catch; Future  -     POC Urine Drug Screen, Triage    2. DDD (degenerative disc disease), lumbar  -     EMG & Nerve Conduction Test; Future  -     Urine Drug Screen Confirmation - Urine, Clean Catch; Future  -     POC Urine Drug Screen, Triage    3. Lumbar facet arthropathy  -     Urine Drug Screen Confirmation - Urine, Clean Catch; Future  -     POC Urine Drug Screen, Triage    4. Chronic pain of both knees  -     Urine Drug Screen Confirmation - Urine, Clean Catch; Future  -     POC Urine Drug Screen, Triage    5. Encounter for long-term (current) use of high-risk medication  -     Urine Drug Screen Confirmation - Urine, Clean Catch; Future  -     POC Urine Drug Screen, Triage    6. Pain in both lower extremities  -     EMG & Nerve Conduction Test; Future  -     Urine Drug Screen Confirmation - Urine, Clean Catch; Future  -     POC Urine Drug Screen, Triage    7. Weakness of both lower extremities  -     EMG & Nerve Conduction Test; Future  -     Urine Drug Screen Confirmation - Urine, Clean Catch; Future  -     POC Urine Drug Screen,  Triage      --- Routine UDS in office today as part of monitoring requirements for controlled substances.  The specimen was viewed and the immunoassay result reviewed and is NEGATIVE(APPROPRIATE FOR TRAMADOL).  This specimen will be sent to [facility] laboratory for confirmation.     --- The patient signed an updated copy of the controlled substance agreement on 12-20-21  --- EMG/NCS- BLE.  --- Continues with regimen. Does not need refill at this time. Patient appears stable with current regimen. No adverse effects. Regarding continuation of opioids, there is no evidence of aberrant behavior or any red flags.  The patient continues with appropriate response to opioid therapy. ADL's remain intact by self.   --- Continue with plans for evaluation with Dr Merrill.  --- Declines any lumbar interventions at this time.   --- Follow-up 3 months or sooner if needed.           ANT REPORT  As part of the patient's treatment plan, I am prescribing controlled substances. The patient has been made aware of appropriate use of such medications, including potential risk of somnolence, limited ability to drive and/or work safely, and the potential for dependence or overdose. It has also bee made clear that these medications are for use by this patient only, without concomitant use of alcohol or other substances unless prescribed.     Patient has completed prescribing agreement detailing terms of continued prescribing of controlled substances, including monitoring ANT reports, urine drug screening, and pill counts if necessary. The patient is aware that inappropriate use will results in cessation of prescribing such medications.    As the clinician, I personally reviewed the ANT from 2-16-22 while the patient was in the office today.    History and physical exam exhibit continued safe and appropriate use of controlled substances.       Dictated utilizing Dragon dictation.     This document is intended for medical expert  use only. Reading of this document by patients and/or patient's family without participating medical staff guidance may result in misinterpretation and unintended morbidity.   Any interpretation of such data is the responsibility of the patient and/or family member responsible for the patient in concert with their primary or specialist providers, not to be left for sources of online searches such as [employer], [employer] or similar queries. Relying on these approaches to knowledge may result in misinterpretation, misguided goals of care and even death should patients or family members try recommendations outside of the realm of professional medical care in a supervised way.

## 2022-03-30 RX ORDER — TRAMADOL HYDROCHLORIDE 50 MG/1
50 TABLET ORAL DAILY PRN
Qty: 30 TABLET | Refills: 0 | Status: SHIPPED | OUTPATIENT
Start: 2022-03-30 | End: 2022-05-05

## 2022-05-05 RX ORDER — TRAMADOL HYDROCHLORIDE 50 MG/1
50 TABLET ORAL DAILY PRN
Qty: 30 TABLET | Refills: 0 | Status: SHIPPED | OUTPATIENT
Start: 2022-05-05 | End: 2022-08-16 | Stop reason: SDUPTHER

## 2022-05-16 ENCOUNTER — OFFICE VISIT (OUTPATIENT)
Dept: PAIN MEDICINE | Facility: CLINIC | Age: 62
End: 2022-05-16

## 2022-05-16 VITALS
TEMPERATURE: 97.5 F | OXYGEN SATURATION: 98 % | BODY MASS INDEX: 45.29 KG/M2 | SYSTOLIC BLOOD PRESSURE: 152 MMHG | HEART RATE: 84 BPM | HEIGHT: 66 IN | WEIGHT: 281.8 LBS | RESPIRATION RATE: 16 BRPM | DIASTOLIC BLOOD PRESSURE: 74 MMHG

## 2022-05-16 DIAGNOSIS — M25.561 CHRONIC PAIN OF BOTH KNEES: ICD-10-CM

## 2022-05-16 DIAGNOSIS — M51.36 DDD (DEGENERATIVE DISC DISEASE), LUMBAR: ICD-10-CM

## 2022-05-16 DIAGNOSIS — G89.29 CHRONIC PAIN OF BOTH KNEES: ICD-10-CM

## 2022-05-16 DIAGNOSIS — M47.816 LUMBAR FACET ARTHROPATHY: ICD-10-CM

## 2022-05-16 DIAGNOSIS — M25.562 CHRONIC PAIN OF BOTH KNEES: ICD-10-CM

## 2022-05-16 DIAGNOSIS — G89.29 OTHER CHRONIC PAIN: Primary | ICD-10-CM

## 2022-05-16 DIAGNOSIS — Z79.899 ENCOUNTER FOR LONG-TERM (CURRENT) USE OF HIGH-RISK MEDICATION: ICD-10-CM

## 2022-05-16 PROCEDURE — 99214 OFFICE O/P EST MOD 30 MIN: CPT | Performed by: NURSE PRACTITIONER

## 2022-05-16 NOTE — PROGRESS NOTES
CHIEF COMPLAINT  FOLLOW UP LUMBAR/JOINT PAIN  3 Month evaluation- Patient in office states her pain level has continued to remain consistent . Mentioned she has an EMG set up with Dr. Damaso Ji 05/19/2022.     Subjective   Damaris Williamson is a 61 y.o. female  who presents for follow-up.  She has a history of chronic back and joint pain. Reports her pain pattern is UNCHANGED since last evaluation.    Is scheduled for EMG-BLE-- 5-19-22  Scheduled for Dr Merrill evaluation 7-25-22.    Complains of pain in her low back, mid back and joints. Today her pain is 7/10VAS. Describes her pain as continuous aching with intermittent sharp pain. Pain increases with walking/standing, housework, yardwork, laying flat on back; pain decreases with medication, rest and procedures.  Continues with Tramadol 50 mg 1-2/day and flexeril 5 mg 1/day PRN(1-2/week)(has not had to take as regularly-- decrease in spasms). Also takes [facility]. Had to stop ibuprofen and naproxen due to liver function elevations.  Also using Voltaren gel and CBD gel her knee.  Denies any side effects from the regimen except constipation. Takes OTC's(Metamucil and colace) with positive results. Does have a history of IBS but generally has constipation issues.    The regimen helps decrease her pain by 50%.  ADL's by self. Denies any bowel or bladder changes. She later states she has intermittent urinary incontinence with sneezing and laughing.      Dr Hsieh is orthopedist. Was going to have  Left knee replacement, but most recent xrays showed bone spurs and insurance denied the replacement.      Has a history of neuropathy. Under care of podiatrist.     Patient remained masked during entire encounter. No cough present. I donned a mask and eye protection throughout entire visit. Prior to donning mask and eye protection, hand hygiene was performed, as well as when it was doffed.  I was closer than 6 feet, but not for an extended period of time. No obvious  exposure to any bodily fluids.    Back Pain  This is a chronic problem. The current episode started more than 1 year ago. The problem occurs constantly. The problem has been gradually worsening (unchanged since last evaluation) since onset. The pain is present in the lumbar spine and sacro-iliac. The quality of the pain is described as aching and shooting. The pain radiates to the left thigh. The pain is at a severity of 7/10. The pain is the same all the time. The symptoms are aggravated by bending, position, sitting, standing and twisting. Stiffness is present in the morning. Associated symptoms include bladder incontinence (unchanged), bowel incontinence (unchanged), leg pain, numbness (lower extremeties), paresis, paresthesias, tingling and weakness. Pertinent negatives include no abdominal pain, chest pain, dysuria, fever, headaches, pelvic pain, perianal numbness or weight loss. Risk factors include obesity, menopause, lack of exercise, poor posture and sedentary lifestyle. She has tried analgesics, muscle relaxant and NSAIDs for the symptoms.   Joint Pain  This is a chronic problem. The current episode started more than 1 year ago. The problem occurs constantly. The problem has been gradually worsening. Associated symptoms include arthralgias (joint), neck pain, numbness (lower extremeties) and weakness. Pertinent negatives include no abdominal pain, chest pain, chills, congestion, coughing, fatigue, fever, headaches or nausea. Joint swelling: right knee.      Has seen rheum. No inflammatory arthritis.    She is s/p right TKR on 7-18-19(Dr MAST).  Past pain medications: states some strong pain medicines in the beginning     Current pain medications:   Flexeril 5 mg prn - sparingly  Ibuprofen/Naproxen- alternates  Tramadol - takes almost daily      Past therapies:  Physical Therapy: yes-- no relief  Chiropractor: yes- pt states she had a family member who was a chiropractor and he would adjust her for free but  he has since passed away.   Massage Therapy: no  TENS: yes  Neck or back surgery: no  Past pain management: yes     Previous Injections: bilateral L2-L4 RFA 8-19-21  Effect of Injection (%): 0  Length of Relief: 0     Previous Injections: TF BRYCE left L4 10-6-20  Effect of Injection (%): 50%  Length of Relief: ONGOING     Previous Injections: bilateral L2-L4 RFL 6-8-20  Effect of Injection (%): 85-90%  Length of Relief: ongoing x 10 months     Previous Injection: Bilateral L2-L5 RFA - 1/16/19 and 2/11/19      Previous Injection: Bilateral L2-5 Lumbar Medial Branch Blockade on 8/22/18 - 5/2/2018  Effect of Injection (%): 50%     Length of Relief: 3-months     Previous Injections: yes at previous pain management, last one maybe [id number]  Effect of Injection (%): 50%  Length of Relief: couple of months    Study Result    Narrative & Impression   MRI OF THE LUMBAR SPINE WITHOUT CONTRAST 01/23/2022     CLINICAL HISTORY: Osteoarthritis lumbosacral region, spinal stenosis  lumbosacral region, low back pain extending bilaterally in the legs down  the knees.     TECHNIQUE: Sagittal T1, proton density and fat-suppressed T2-weighted  images were obtained of the lumbar spine, in addition axial T2-weighted  images were obtained from L1 to S1, thin cut axial T1-weighted images  were obtained angled through the interspaces from L3 to S1.     COMPARISON: This is correlated to prior MRI of the lumbar spine  09/17/2020.     FINDINGS: The distal thoracic cord and conus is normal in signal  intensity conus terminates at L1 lumbar level which is normal.     The T11-T12, T12-L1, and L1-L2 disc spaces and facets are normal with no  canal or foraminal narrowing from T11 to L2.     At L2-L3, the disc space is normal. There is mild bilateral facet  overgrowth. There is no canal or foraminal narrowing.     At L3-L4, there is mild bilateral facet overgrowth. There is some T2  high signal in the left foraminal to far left lateral annulus  with  slight eccentric spurring and bulging disc material into the left  foramen and to the far left laterally mildly narrowing the lateral left  foramen, spurs and bulging disc material may abut the anteromedial left  L3 nerve root lateral to left foramen. There is no central canal or  right foraminal narrowing.     At L4-L5, there is mild-to-moderate bilateral facet overgrowth.  Posterior disc margin is normal. There is no canal or right foraminal  narrowing. There is some T2 high signal in left posterior lateral to  foraminal annulus, compatible with a left posterior lateral-subarticular  and foraminal annular tear. There has been interval development of a  small left posterior lateral-subarticular disc protrusion or tiny disc  herniation measuring 4 x 3 mm in size. It abuts the ventral aspect of  the traversing left L5 nerve root, but does not displace it. There is  some minimal spurring and bulging disc material into the left foramen  with mild left foraminal narrowing.     At L5-S1, there is mild bilateral facet overgrowth. There is minimal  disc desiccation. There is a right paracentral posterior lateral annular  tear, but otherwise posterior disc margin is normal, and there is no  canal or lateral recess or foraminal narrowing.     IMPRESSION:  1. There is a new finding at the L4-5 lumbar level; otherwise, there has  been no interval change since prior MRI of the lumbar spine on  09/17/2020. At L4-5 there is mild-to-moderate bilateral facet overgrowth  and there is a left posterior lateral to foraminal annular tear. Since  prior MRI of the lumbar spine on 09/17/2020, there is been interval  development of a tiny 4 x 3 mm left posterior lateral-subarticular disc  protrusion or tiny disc herniation that abuts the ventral aspect of the  traversing left L5 nerve root slightly narrowing the left lateral recess  at L4-5 but does not displace the left L5 nerve root. There is slight  eccentric spurring and bulging  disc material into left neural foramen  with stable mild left foraminal narrowing at L4-5. Otherwise there is  been no change since prior MRI lumbar spine 09/17/2020. While there is  mild bilateral facet overgrowth at L2-L3, the remainder of the disc  space and facets are normal with no canal or foraminal narrowing from  T11 to L3.  2. The posterior disc margin and facets are normal with no canal or  foraminal narrowing from T11 to L2. At L2-3, there is mild facet  overgrowth and a minimal left foraminal disc bulge but no canal or  foraminal narrowing.  3. At L3-L4, there is a left foraminal to far left lateral annular tear  with minimal eccentric spurring and bulging disc material into the  inferior lateral left foramen and to the far left laterally mildly  narrowing the lateral aspect of the left foramen.  4. At L5-S1, there is a tiny right paracentral to posterior lateral  annular tear but no canal or foraminal narrowing. The remainder of the  lumbar spine MRI is unremarkable.     This report was finalized on 1/24/2022 8:28 AM by Dr. Esa Coffman M.D.            PEG Assessment   What number best describes your pain on average in the past week?9  What number best describes how, during the past week, pain has interfered with your enjoyment of life?9  What number best describes how, during the past week, pain has interfered with your general activity?  9    The following portions of the patient's history were reviewed and updated as appropriate: allergies, current medications, past family history, past medical history, past social history, past surgical history and problem list.    Review of Systems   Constitutional: Positive for activity change (increased). Negative for chills, fatigue, fever and weight loss.   HENT: Negative for congestion.    Eyes: Negative for visual disturbance.   Respiratory: Negative for cough and chest tightness.    Cardiovascular: Negative for chest pain.   Gastrointestinal: Positive for  "bowel incontinence (unchanged) and constipation. Negative for abdominal pain, diarrhea and nausea.   Genitourinary: Positive for bladder incontinence (unchanged). Negative for difficulty urinating, dysuria and pelvic pain.   Musculoskeletal: Positive for arthralgias (joint), back pain and neck pain. Joint swelling: right knee.   Neurological: Positive for dizziness (vertigo), tingling, weakness, numbness (lower extremeties) and paresthesias. Negative for light-headedness and headaches.   Psychiatric/Behavioral: Negative for agitation, sleep disturbance and suicidal ideas. The patient is nervous/anxious.      I have reviewed and confirmed the accuracy of the ROS as documented by the MA/LPN/RN Nathaly Stewart, CARLITA      Vitals:    05/16/22 1046   BP: 152/74   BP Location: Right arm   Patient Position: Sitting   Pulse: 84   Resp: 16   Temp: 97.5 °F (36.4 °C)   SpO2: 98%   Weight: 128 kg (281 lb 12.8 oz)   Height: 167.6 cm (65.98\")   PainSc:   7   PainLoc: Back  Comment: joint         Objective   Physical Exam  Vitals and nursing note reviewed.   Constitutional:       Appearance: Normal appearance. She is well-developed.   HENT:      Head: Normocephalic and atraumatic.   Eyes:      General: Lids are normal.   Musculoskeletal:      Cervical back: Pain with movement, spinous process tenderness and muscular tenderness present. Decreased range of motion.      Lumbar back: Tenderness and bony tenderness (minimal lumbar facet tenderness) present. Decreased range of motion.      Comments: Widespread OA changes with no acute synovitis    Mild to modreate tenderness of left SI joint, minimal of right   Skin:     General: Skin is warm and dry.   Neurological:      Mental Status: She is alert.      Gait: Gait abnormal (cane).   Psychiatric:         Speech: Speech normal.         Behavior: Behavior normal. Behavior is cooperative.         Thought Content: Thought content normal.         Judgment: Judgment normal. "         Assessment & Plan   Diagnoses and all orders for this visit:    1. Other chronic pain (Primary)    2. DDD (degenerative disc disease), lumbar    3. Lumbar facet arthropathy    4. Chronic pain of both knees    5. Encounter for long-term (current) use of high-risk medication      --- The urine drug screen confirmation from 2-16-22 has been reviewed and the result is APPROPRIATE based on patient history and ANT report  --- The patient signed an updated copy of the controlled substance agreement on 12-20-21  --- Continue with other specialists as planned.  --- Continue with regimen as planned. Does not need refill at this time. Patient appears stable with current regimen. No adverse effects. Regarding continuation of opioids, there is no evidence of aberrant behavior or any red flags.  The patient continues with appropriate response to opioid therapy. ADL's remain intact by self.   --- Follow-up 3 months or sooner if needed.       ANT REPORT  As part of the patient's treatment plan, I am prescribing controlled substances. The patient has been made aware of appropriate use of such medications, including potential risk of somnolence, limited ability to drive and/or work safely, and the potential for dependence or overdose. It has also bee made clear that these medications are for use by this patient only, without concomitant use of alcohol or other substances unless prescribed.     Patient has completed prescribing agreement detailing terms of continued prescribing of controlled substances, including monitoring ANT reports, urine drug screening, and pill counts if necessary. The patient is aware that inappropriate use will results in cessation of prescribing such medications.    As the clinician, I personally reviewed the ANT from 5-16-22 while the patient was in the office today.    History and physical exam exhibit continued safe and appropriate use of controlled substances.       Dictated utilizing  Rodolfo dictation.     This document is intended for medical expert use only. Reading of this document by patients and/or patient's family without participating medical staff guidance may result in misinterpretation and unintended morbidity.   Any interpretation of such data is the responsibility of the patient and/or family member responsible for the patient in concert with their primary or specialist providers, not to be left for sources of online searches such as [employer], [employer] or similar queries. Relying on these approaches to knowledge may result in misinterpretation, misguided goals of care and even death should patients or family members try recommendations outside of the realm of professional medical care in a supervised way.

## 2022-05-18 ENCOUNTER — TELEPHONE (OUTPATIENT)
Dept: NEUROLOGY | Facility: CLINIC | Age: 62
End: 2022-05-18

## 2022-05-19 ENCOUNTER — HOSPITAL ENCOUNTER (OUTPATIENT)
Dept: INFUSION THERAPY | Facility: HOSPITAL | Age: 62
Discharge: HOME | End: 2022-05-19
Admitting: PSYCHIATRY & NEUROLOGY

## 2022-05-19 DIAGNOSIS — R29.898 WEAKNESS OF BOTH LOWER EXTREMITIES: ICD-10-CM

## 2022-05-19 DIAGNOSIS — M79.605 PAIN IN BOTH LOWER EXTREMITIES: ICD-10-CM

## 2022-05-19 DIAGNOSIS — M79.604 PAIN IN BOTH LOWER EXTREMITIES: ICD-10-CM

## 2022-05-19 DIAGNOSIS — M51.36 DDD (DEGENERATIVE DISC DISEASE), LUMBAR: ICD-10-CM

## 2022-05-19 PROCEDURE — 95909 NRV CNDJ TST 5-6 STUDIES: CPT

## 2022-05-19 PROCEDURE — 95886 MUSC TEST DONE W/N TEST COMP: CPT

## 2022-05-19 PROCEDURE — 95886 MUSC TEST DONE W/N TEST COMP: CPT | Performed by: PSYCHIATRY & NEUROLOGY

## 2022-05-19 PROCEDURE — 95909 NRV CNDJ TST 5-6 STUDIES: CPT | Performed by: PSYCHIATRY & NEUROLOGY

## 2022-05-19 NOTE — PROCEDURES
EMG and Nerve Conduction Studies    I.      Instrument used: Neuromax 1002  II.     Please see data sheets for tabular summary of NCS and details on methods, temperatures and lab standards.   III.    EMG muscles tested for upper extremity studies include the deltoid, biceps, triceps, pronator teres, extensor digitorum communis, first dorsal interosseous and abductor pollicis brevis.    IV.   EMG muscles tested for lower extremity studies include the vastus lateralis, tibialis anterior, peroneus longus, medial gastrocnemius and extensor digitorum brevis.    V.    Additional muscles tested as needed.  Paraspinal muscles tested as needed.   VI.   Please see data sheets for tabular summary of EMG findings.   VII. The complete report includes the data sheets.      Indication: Low back pain with bilateral sciatica   History: 61-year-old white female with low back pain and bilateral sciatica with numbness in the right leg from the knee down and in the left foot and just above the ankle.        Ht: 167.6 cm  Wt: 128 kg; BMI 45.51  HbA1C: No results found for: HGBA1C  TSH: No results found for: TSH    Technical summary:  Nerve conduction studies were obtained in the right leg with 1 comparison on the left.  Skin temperatures were 32 °C measured at each ankle.  Needle examination was obtained on selected muscles in both legs.    Results:  1.  Normal sural sensory distal latencies and amplitudes bilaterally.  2.  Normal right superficial peroneal sensory distal latency and amplitude.  3.  Normal right peroneal motor conduction velocities with normal distal latency and amplitude from ankle stimulation recorded over the extensor digitorum brevis.  The study was repeated recording over the tibialis anterior showing a normal conduction velocity and amplitude.  4.  Normal right tibial motor conduction velocity, distal latency and amplitude from ankle stimulation.  The amplitude from the popliteal fossa was very low due to obesity  at the popliteal fossa.  (The amplitude from 120 mm was low due to obesity at the ankle.  When performed at 60 mm however the amplitude was quite normal.)  5.  Needle examination of selected muscles of both legs showed normal insertional activities throughout with normal motor units and recruitment patterns which were full for effort.    Impression:  Normal study given technical considerations.  No evidence of peripheral neuropathy or a lumbosacral radiculopathy on either side.  Study results were discussed with the patient.    Damaso Ji M.D.              Dictated utilizing Dragon dictation.

## 2022-07-25 ENCOUNTER — OFFICE VISIT (OUTPATIENT)
Dept: NEUROSURGERY | Facility: CLINIC | Age: 62
End: 2022-07-25

## 2022-07-25 VITALS
HEART RATE: 86 BPM | SYSTOLIC BLOOD PRESSURE: 160 MMHG | HEIGHT: 66 IN | BODY MASS INDEX: 45.39 KG/M2 | DIASTOLIC BLOOD PRESSURE: 88 MMHG | OXYGEN SATURATION: 99 % | WEIGHT: 282.41 LBS | TEMPERATURE: 97.6 F

## 2022-07-25 DIAGNOSIS — G89.29 CHRONIC NECK PAIN: ICD-10-CM

## 2022-07-25 DIAGNOSIS — M54.2 CHRONIC NECK PAIN: ICD-10-CM

## 2022-07-25 DIAGNOSIS — M50.322 DEGENERATION OF INTERVERTEBRAL DISC AT C5-C6 LEVEL: ICD-10-CM

## 2022-07-25 DIAGNOSIS — G89.29 CHRONIC MIDLINE LOW BACK PAIN WITH BILATERAL SCIATICA: ICD-10-CM

## 2022-07-25 DIAGNOSIS — M54.41 CHRONIC MIDLINE LOW BACK PAIN WITH BILATERAL SCIATICA: ICD-10-CM

## 2022-07-25 DIAGNOSIS — M54.42 CHRONIC MIDLINE LOW BACK PAIN WITH BILATERAL SCIATICA: ICD-10-CM

## 2022-07-25 DIAGNOSIS — M51.36 DDD (DEGENERATIVE DISC DISEASE), LUMBAR: Primary | ICD-10-CM

## 2022-07-25 PROCEDURE — 99204 OFFICE O/P NEW MOD 45 MIN: CPT | Performed by: NEUROLOGICAL SURGERY

## 2022-07-25 NOTE — PROGRESS NOTES
Subjective   Patient ID: Damaris Williamson is a 62 y.o. female is being seen for consultation today at the request of CARLITA Conti for low back pain. She reports bilateral low back pain with bilateral leg pain but left is worse than right. She does have numbness and tingling intermittently. She is taking tramadol that is managed by pain management. She has been doing radiofrequency ablations but the most recent did not help at all. She is not doing any therapy right now. She reports that she has been having some bowel leakage that began about a year ago but has not gotten any worse.     I saw this very nice lady for years ago.  I did not think there were any surgical options at that point.  I been asked to reevaluate her.  Ablations used to help and she had about 4 or 5 in a row but they no longer benefit her much.  It is mainly the back pain.  She did get her knee replacement on the right after I saw her and may need a left-sided knee replacement at some point.  But she does not really have any significant sciatica mainly back pain itself.  Little bit worse on extension and better with flexion.  No motor deficits.  We reviewed the new MRI.  Nothing is really changed in terms of the lack of any need for any kind of surgery.  If the lumbar facet ablation did not help then maybe they could switch over to a sacroiliac ablation and if that does not help then may be a spinal cord stimulator trial.  That would be reasonable.  I did encourage her course to try and continue to lose weight and get exercise.  But I do not really have much to offer at this point and we will keep it open-ended.        Back Pain  This is a chronic problem. The current episode started more than 1 year ago. The problem occurs constantly. The problem has been gradually worsening since onset. The pain is present in the lumbar spine. The quality of the pain is described as aching and burning. The pain radiates to the right thigh and left  "thigh. The pain is at a severity of 9/10. The pain is severe. The symptoms are aggravated by bending, standing and twisting. Associated symptoms include leg pain, numbness and tingling. Pertinent negatives include no bladder incontinence, bowel incontinence, chest pain, fever or weakness.       The following portions of the patient's history were reviewed and updated as appropriate: allergies, current medications, past family history, past medical history, past social history, past surgical history and problem list.    Review of Systems   Constitutional: Negative for activity change and fever.   HENT: Negative for congestion.    Eyes: Negative for visual disturbance.   Respiratory: Negative for chest tightness and shortness of breath.    Cardiovascular: Negative for chest pain.   Gastrointestinal: Negative for bowel incontinence, nausea and vomiting.   Endocrine: Negative for cold intolerance and heat intolerance.   Genitourinary: Negative for bladder incontinence and difficulty urinating.   Musculoskeletal: Positive for back pain.   Skin: Negative for wound.   Allergic/Immunologic: Negative for environmental allergies.   Neurological: Positive for tingling and numbness. Negative for weakness.   Hematological: Bruises/bleeds easily.   Psychiatric/Behavioral: Positive for sleep disturbance.   All other systems reviewed and are negative.          Objective     Vitals:    07/25/22 1615   BP: 160/88   Pulse: 86   Temp: 97.6 °F (36.4 °C)   SpO2: 99%   Weight: 128 kg (282 lb 6.6 oz)   Height: 167.6 cm (65.98\")     Body mass index is 45.61 kg/m².      Physical Exam  Constitutional:       Appearance: She is well-developed.   HENT:      Head: Normocephalic and atraumatic.   Eyes:      Extraocular Movements: EOM normal.      Conjunctiva/sclera: Conjunctivae normal.      Pupils: Pupils are equal, round, and reactive to light.   Neck:      Vascular: No carotid bruit.   Neurological:      Mental Status: She is oriented to " person, place, and time.      Coordination: Finger-Nose-Finger Test and Heel to Shin Test normal.      Gait: Gait is intact.      Deep Tendon Reflexes:      Reflex Scores:       Tricep reflexes are 2+ on the right side and 2+ on the left side.       Bicep reflexes are 2+ on the right side and 2+ on the left side.       Brachioradialis reflexes are 2+ on the right side and 2+ on the left side.       Patellar reflexes are 2+ on the right side and 2+ on the left side.       Achilles reflexes are 2+ on the right side and 2+ on the left side.  Psychiatric:         Speech: Speech normal.       Neurologic Exam     Mental Status   Oriented to person, place, and time.   Registration of memory: Good recent and remote memory.   Attention: normal. Concentration: normal.   Speech: speech is normal   Level of consciousness: alert  Knowledge: consistent with education.     Cranial Nerves     CN II   Visual fields full to confrontation.   Visual acuity: normal    CN III, IV, VI   Pupils are equal, round, and reactive to light.  Extraocular motions are normal.     CN V   Facial sensation intact.   Right corneal reflex: normal  Left corneal reflex: normal    CN VII   Facial expression full, symmetric.   Right facial weakness: none  Left facial weakness: none    CN VIII   Hearing: intact    CN IX, X   Palate: symmetric    CN XI   Right sternocleidomastoid strength: normal  Left sternocleidomastoid strength: normal    CN XII   Tongue: not atrophic  Tongue deviation: none    Motor Exam   Muscle bulk: normal  Right arm tone: normal  Left arm tone: normal  Right leg tone: normal  Left leg tone: normal    Strength   Strength 5/5 except as noted.     Sensory Exam   Light touch normal.     Gait, Coordination, and Reflexes     Gait  Gait: normal    Coordination   Finger to nose coordination: normal  Heel to shin coordination: normal    Reflexes   Right brachioradialis: 2+  Left brachioradialis: 2+  Right biceps: 2+  Left biceps: 2+  Right  triceps: 2+  Left triceps: 2+  Right patellar: 2+  Left patellar: 2+  Right achilles: 2+  Left achilles: 2+  Right : 2+  Left : 2+          Assessment & Plan   Independent Review of Radiographic Studies:      I personally reviewed the images from the following studies.    I reviewed the follow-up MRI done in January of this year which showed multilevel disc degeneration and facet disease but no significant disc herniation or spinal stenosis.  There was also a CT scan done of the soft tissues of the neck which did show some disc degeneration at C5-C6.  Agree with the reports.      Medical Decision Making:      Unfortunately, nothing surgical to offer.  I told her to continue working with the pain management group.  Perhaps sacroiliac ablation could be considered.  If that does not help then she might be eligible for a spinal cord stimulator trial.      Diagnoses and all orders for this visit:    1. DDD (degenerative disc disease), lumbar (Primary)    2. Chronic midline low back pain with bilateral sciatica    3. Chronic neck pain    4. Degeneration of intervertebral disc at C5-C6 level      Return if symptoms worsen or fail to improve.

## 2022-08-16 ENCOUNTER — OFFICE VISIT (OUTPATIENT)
Dept: PAIN MEDICINE | Facility: CLINIC | Age: 62
End: 2022-08-16

## 2022-08-16 VITALS
WEIGHT: 280.4 LBS | TEMPERATURE: 96.3 F | HEIGHT: 66 IN | OXYGEN SATURATION: 97 % | SYSTOLIC BLOOD PRESSURE: 142 MMHG | DIASTOLIC BLOOD PRESSURE: 86 MMHG | HEART RATE: 93 BPM | RESPIRATION RATE: 18 BRPM | BODY MASS INDEX: 45.06 KG/M2

## 2022-08-16 DIAGNOSIS — G89.29 CHRONIC PAIN OF BOTH KNEES: ICD-10-CM

## 2022-08-16 DIAGNOSIS — M25.562 CHRONIC PAIN OF BOTH KNEES: ICD-10-CM

## 2022-08-16 DIAGNOSIS — M47.816 LUMBAR FACET ARTHROPATHY: ICD-10-CM

## 2022-08-16 DIAGNOSIS — M51.36 DDD (DEGENERATIVE DISC DISEASE), LUMBAR: ICD-10-CM

## 2022-08-16 DIAGNOSIS — M25.561 CHRONIC PAIN OF BOTH KNEES: ICD-10-CM

## 2022-08-16 DIAGNOSIS — Z79.899 ENCOUNTER FOR LONG-TERM (CURRENT) USE OF HIGH-RISK MEDICATION: ICD-10-CM

## 2022-08-16 DIAGNOSIS — G89.29 OTHER CHRONIC PAIN: Primary | ICD-10-CM

## 2022-08-16 PROCEDURE — 80305 DRUG TEST PRSMV DIR OPT OBS: CPT | Performed by: NURSE PRACTITIONER

## 2022-08-16 PROCEDURE — 99214 OFFICE O/P EST MOD 30 MIN: CPT | Performed by: NURSE PRACTITIONER

## 2022-08-16 RX ORDER — TRAMADOL HYDROCHLORIDE 50 MG/1
50 TABLET ORAL DAILY PRN
Qty: 30 TABLET | Refills: 0 | Status: SHIPPED | OUTPATIENT
Start: 2022-08-16 | End: 2022-10-13

## 2022-08-16 RX ORDER — CYCLOBENZAPRINE HCL 5 MG
5 TABLET ORAL 2 TIMES DAILY PRN
Qty: 60 TABLET | Refills: 1 | Status: SHIPPED | OUTPATIENT
Start: 2022-08-16 | End: 2023-03-09 | Stop reason: SDUPTHER

## 2022-08-16 NOTE — PROGRESS NOTES
CHIEF COMPLAINT  Follow-up for back and knee pain.    Subjective   Damaris Williamson is a 62 y.o. female  who presents for follow-up.  She has a history of back pain, knee pain. Also neck pain worsening, feels grinding, MRI is pending.     Patient is seen for chronic pain in her back as well as knee.  Pain today 8/10 VAS.  Maintained on tramadol 50 mg 1 to 2/day as well as Flexeril 5 mg 1 daily as needed.  Avoiding oral NSAIDs due to LFT elevation. Medication regimen does help. No adverse reactions. Some constipation, managed with occasional stool softener.  Started a compounded pain cream (podiatry) which helps, anticipates surgery to remove heel spur left foot at some point.      Left knee replacement denied by insurance. Right TKA in 2019 (Dr. Hsieh).  Has seen rheum. No inflammatory arthritis.     EMG 5/19/2022 --- normal    Dr. Merirll 7/25/2022 (note reviewed) --- no current neurosurgical recommendations.  Could consider sacroiliac ablation.  If that does not help she may be eligible for spinal cord stimulator trial.    Procedures ---   Bilateral L2-L4 RFA 8/19/2021 --- no relief  Left L4 transforaminal epidural 10/2020 --- 50% relief of radicular symptoms  Bilateral L2-L4 RFA 6/8/2020 --- 80-90% relief for 10 months  Bilateral L2-L5 RFA in 2019 provided significant relief    Back Pain  This is a chronic problem. The current episode started more than 1 month ago. The problem occurs daily. The problem has been waxing and waning since onset. The pain is present in the lumbar spine. The quality of the pain is described as aching and burning. The pain radiates to the left thigh. The pain is at a severity of 8/10. The symptoms are aggravated by standing and sitting (prolonged sitting, lifting). Associated symptoms include headaches, numbness and weakness. Pertinent negatives include no abdominal pain, chest pain, dysuria or fever. She has tried analgesics, NSAIDs, heat, ice, muscle relaxant and home exercises for  the symptoms. The treatment provided moderate relief.   Neck Pain   The current episode started more than 1 month ago. The problem occurs daily. The problem has been waxing and waning. The pain is present in the left side, midline and right side. The quality of the pain is described as aching. The pain is at a severity of 8/10. Associated symptoms include headaches, numbness and weakness. Pertinent negatives include no chest pain or fever. She has tried NSAIDs, neck support, muscle relaxants and home exercises for the symptoms. The treatment provided moderate relief.     PEG Assessment   What number best describes your pain on average in the past week?8  What number best describes how, during the past week, pain has interfered with your enjoyment of life?10  What number best describes how, during the past week, pain has interfered with your general activity?  7    The following portions of the patient's history were reviewed and updated as appropriate: allergies, current medications, past family history, past medical history, past social history, past surgical history and problem list.    Review of Systems   Constitutional: Positive for fatigue. Negative for fever.   HENT: Negative for congestion.    Eyes: Negative for visual disturbance.   Respiratory: Negative for shortness of breath.    Cardiovascular: Negative for chest pain.   Gastrointestinal: Positive for constipation. Negative for abdominal pain and diarrhea.   Genitourinary: Negative for difficulty urinating and dysuria.   Musculoskeletal: Positive for arthralgias (bilateral knees), back pain and neck pain.   Neurological: Positive for weakness, numbness and headaches.   Psychiatric/Behavioral: Positive for sleep disturbance. Negative for suicidal ideas. The patient is nervous/anxious.      I have reviewed and confirmed the accuracy of the ROS as documented by the MA/LATISHA/CARLITA Quintana    MRI LUMBAR       Vitals:    08/16/22 1133   BP: 142/86  "  Pulse: 93   Resp: 18   Temp: 96.3 °F (35.7 °C)   SpO2: 97%   Weight: 127 kg (280 lb 6.4 oz)   Height: 167.6 cm (65.98\")   PainSc:   8   PainLoc: Back     Objective   Physical Exam  Vitals and nursing note reviewed.   Constitutional:       General: She is not in acute distress.     Appearance: Normal appearance. She is not ill-appearing.   Pulmonary:      Effort: Pulmonary effort is normal. No respiratory distress.   Musculoskeletal:      Cervical back: Spasms, tenderness and bony tenderness present.      Lumbar back: Spasms, tenderness and bony tenderness present.      Comments: +SI joint tenderness bilaterally  +LOREN bilaterally  +SI joint compression bilaterally  +thigh thrust bilaterally   Skin:     General: Skin is warm and dry.   Neurological:      Mental Status: She is alert and oriented to person, place, and time.      Motor: Motor function is intact. No weakness.      Gait: Gait normal.   Psychiatric:         Mood and Affect: Mood normal.         Behavior: Behavior normal.       Assessment & Plan   Diagnoses and all orders for this visit:    1. Other chronic pain (Primary)  -     Urine Drug Screen Confirmation - Urine, Clean Catch; Future  -     POC Urine Drug Screen, Triage    2. DDD (degenerative disc disease), lumbar  -     Urine Drug Screen Confirmation - Urine, Clean Catch; Future  -     POC Urine Drug Screen, Triage    3. Lumbar facet arthropathy  -     Urine Drug Screen Confirmation - Urine, Clean Catch; Future  -     POC Urine Drug Screen, Triage    4. Chronic pain of both knees  -     Urine Drug Screen Confirmation - Urine, Clean Catch; Future  -     POC Urine Drug Screen, Triage    5. Encounter for long-term (current) use of high-risk medication  -     Urine Drug Screen Confirmation - Urine, Clean Catch; Future  -     POC Urine Drug Screen, Triage    Other orders  -     cyclobenzaprine (FLEXERIL) 5 MG tablet; Take 1 tablet by mouth 2 (Two) Times a Day As Needed for Muscle Spasms.  Dispense: 60 " tablet; Refill: 1  -     traMADol (ULTRAM) 50 MG tablet; Take 1 tablet by mouth Daily As Needed for Moderate Pain .  Dispense: 30 tablet; Refill: 0      --- Bilateral SI joint injection (patient will consider this option)    ----------  Education about Sacroiliac joint injections:  This Sacroiliac joint injection (blockade) we have suggested is intended for diagnostic purposes, with the intent of offering the patient Radiofrequency thermal rhizotomy (RF) of the sensory branches to the joint if the block is diagnostically effective.  The diagnostic blockade is necessary to determine the likelihood that RF therapy could be efficacious in providing long term relief to the patient.    In this procedure, the sacroiliac joint is aligned with imaging, and under image guidance a needle is placed with the needle tip into the joint.  The needle position is confirmed to be appropriately in the joint before injection of medication into the joint.  When xray fluoroscopy is used, contrast dye is used to confirm a proper arthrogram (i.e., outline of the joint).  When ultrasound is used, IV fluid (normal saline) is injected to see the flow of the fluid into the joint.  Once confirmed, then the medication can be injected into the joint.  Oftentimes this medication is a combination of local anesthetics (for diagnostic purposes) and also a steroid (to decrease irritation & inflammation in the joint, also known as sacroilitis).      Medically, a successful RF procedure should provide a patient with 50% pain relief or more for at least 6 months.  Clinical experience suggests that successful patients receive relief more in the range of 12 months on average.  We also discussed that many patients receive therapeutic success from the intraarticular joint injection, and may not require RF ablation.  If a patient receives more than 8 weeks of relief from joint injection, then occasional repeat joint blocks for therapeutic purposes is a very  reasonable alternative therapy.  This course of therapy is consistent with our LCDs according to our CMS  in the area, and therefore other insurance providers should follow accordingly.  We will monitor our patients to screen for these therapeutic responders and will offer RF therapy only when necessary.      We discussed that joint injections & also RF procedures are not without risks.  Best practices regarding anticoagulant use & neuraxial procedures will be respected.  Oftentimes a patient on an anticoagulant can be offered a joint injection safely, but again this is not risk-free, and such patients give consent with regards to this increased bleeding risk, which could cause problems including but not limited to worsening of pain, nerve damage, or muscle damage.  Patients that are ill or otherwise may be at risk for sepsis will not have their spines accessed by neuraxial injections of any type.  This patient will not be offered these therapies if there is an increased risk.   We discussed that there is a risk of postprocedural pain and also a risk of worsening of clinical picture with these procedures as with any neuraxial procedure.    ----------    --- Refill Tramadol. Patient appears stable with current regimen. No adverse effects. Regarding continuation of opioids, there is no evidence of aberrant behavior or any red flags.  The patient continues with appropriate response to opioid therapy. ADL's remain intact by self.   --- Routine UDS in office today as part of monitoring requirements for controlled substances.  The specimen was viewed and the immunoassay result reviewed and is NEG.  This specimen will be sent to [facility] laboratory for confirmation.     --- The patient signed an updated copy of the controlled substance agreement on 12/20/2021  --- Follow-up 3 months        ANT REPORT  As part of the patient's treatment plan, I am prescribing controlled substances. The patient has been  made aware of appropriate use of such medications, including potential risk of somnolence, limited ability to drive and/or work safely, and the potential for dependence or overdose. It has also been made clear that these medications are for use by this patient only, without concomitant use of alcohol or other substances unless prescribed.     Patient has completed prescribing agreement detailing terms of continued prescribing of controlled substances, including monitoring ANT reports, urine drug screening, and pill counts if necessary. The patient is aware that inappropriate use will results in cessation of prescribing such medications.    As the clinician, I personally reviewed the ANT from 8/16/2022 while the patient was in the office today.    History and physical exam exhibit continued safe and appropriate use of controlled substances.     Dictated utilizing Dragon dictation.     This document is intended for medical expert use only. Reading of this document by patients and/or patient's family without participating medical staff guidance may result in misinterpretation and unintended morbidity.   Any interpretation of such data is the responsibility of the patient and/or family member responsible for the patient in concert with their primary or specialist providers, not to be left for sources of online searches such as [employer], [facility] or similar queries. Relying on these approaches to knowledge may result in misinterpretation, misguided goals of care and even death should patients or family members try recommendations outside of the realm of professional medical care in a supervised way.    Patient remained masked during entire encounter. No cough present. I donned a mask and eye protection throughout entire visit. Prior to donning mask and eye protection, hand hygiene was performed, as well as when it was doffed.  I was closer than 6 feet, but not for an extended period of time. No obvious exposure to any  bodily fluids.

## 2022-09-30 ENCOUNTER — TELEPHONE (OUTPATIENT)
Dept: NEUROSURGERY | Facility: CLINIC | Age: 62
End: 2022-09-30

## 2022-09-30 NOTE — TELEPHONE ENCOUNTER
I spoke with Ms. Williamson she reports at her last office visit you all discussed some issues about her neck and she was going to go to an ENT regarding bone spurrs but winded up more so about tissue. She reports the ENT did not order an MRI and thinks the issues that she is having is from the bone spurs. She reports she is needing to know if she needs to get an MRI.     Last office visit 07/25/2022 and Return if symptoms worsen or fail to improve.

## 2022-10-07 ENCOUNTER — OFFICE VISIT (OUTPATIENT)
Dept: NEUROSURGERY | Facility: CLINIC | Age: 62
End: 2022-10-07

## 2022-10-07 DIAGNOSIS — M54.81 BILATERAL OCCIPITAL NEURALGIA: ICD-10-CM

## 2022-10-07 DIAGNOSIS — G89.29 CHRONIC NECK PAIN: Primary | ICD-10-CM

## 2022-10-07 DIAGNOSIS — M54.2 CHRONIC NECK PAIN: Primary | ICD-10-CM

## 2022-10-07 DIAGNOSIS — M51.36 DDD (DEGENERATIVE DISC DISEASE), LUMBAR: ICD-10-CM

## 2022-10-07 PROCEDURE — 99442 PR PHYS/QHP TELEPHONE EVALUATION 11-20 MIN: CPT | Performed by: NEUROLOGICAL SURGERY

## 2022-10-07 NOTE — PROGRESS NOTES
Subjective   Patient ID: Damaris Williamson is a 62 y.o. female is here today for follow-up chronic neck pain.     Today Ms. Williamson reports she is having constant neck pain. She denies any arm. She reports of intermittent tingling in her right hand. She denies any weakness. She reports of constant headaches. She reports she taking tramadol and flexeril for pain.     You have chosen to receive care through a telephone visit. Do you consent to use a telephone visit for your medical care today? Yes    Unable to complete visit using a video connection to the patient. A phone visit was used to complete this visits. Total time of discussion was 11 minutes.    I called from the hospital.  She was at home.  She has history of chronic neck pain and bilateral occipital headache.  There was some swallowing issues.  And she went to see an ENT physician.  She cannot remember the name.  They appropriately indicated that the neck pain and occipital headaches were related to the cervical spine which I agree with.  The patient suggested that they felt the swallowing issues were related to the neck which I would disagree with.  She had an MRI done of the cervical spine and 2020 did not show some mild facet arthropathy but not a lot of large anterior osteophytes that can cause mechanical dysphagia.  She has no radiating arm pain.  I suggested that she speak with the [facility] pain group about perhaps trying some cervical injections either epidural blocks, occipital nerve injections or even a radiofrequency ablation.  She will do that and again we will follow-up as needed since there is nothing of a surgical nature.  History of Present Illness    The following portions of the patient's history were reviewed and updated as appropriate: allergies, current medications, past family history, past medical history, past social history, past surgical history and problem list.    Review of Systems   Constitutional: Positive for activity change.    Musculoskeletal: Positive for neck pain.   Neurological: Positive for numbness. Negative for weakness.   Psychiatric/Behavioral: Positive for sleep disturbance.           Objective     There were no vitals filed for this visit.  There is no height or weight on file to calculate BMI.      Physical Exam  Neurologic Exam        Assessment & Plan   Independent Review of Radiographic Studies:      I personally reviewed the images from the following studies.      I reviewed the follow-up MRI done in January of this year which showed multilevel disc degeneration and facet disease but no significant disc herniation or spinal stenosis.  There was also a CT scan done of the soft tissues of the neck which did show some disc degeneration at C5-C6.  Agree with the reports.    Medical Decision Making:      She will speak to the [facility] group about perhaps trying cervical injections such as those listed above.  Nothing from a surgical standpoint.  We will keep it open-ended.      Diagnoses and all orders for this visit:    1. Chronic neck pain (Primary)    2. Bilateral occipital neuralgia    3. DDD (degenerative disc disease), lumbar      Return if symptoms worsen or fail to improve.

## 2022-10-13 RX ORDER — TRAMADOL HYDROCHLORIDE 50 MG/1
50 TABLET ORAL DAILY PRN
Qty: 30 TABLET | Refills: 0 | Status: SHIPPED | OUTPATIENT
Start: 2022-10-13 | End: 2023-03-09 | Stop reason: SDUPTHER

## 2023-03-09 ENCOUNTER — OFFICE VISIT (OUTPATIENT)
Dept: PAIN MEDICINE | Facility: CLINIC | Age: 63
End: 2023-03-09
Payer: MEDICARE

## 2023-03-09 VITALS
WEIGHT: 285 LBS | TEMPERATURE: 98.7 F | BODY MASS INDEX: 45.8 KG/M2 | HEIGHT: 66 IN | SYSTOLIC BLOOD PRESSURE: 161 MMHG | HEART RATE: 94 BPM | DIASTOLIC BLOOD PRESSURE: 88 MMHG | OXYGEN SATURATION: 97 %

## 2023-03-09 DIAGNOSIS — Z79.899 ENCOUNTER FOR LONG-TERM (CURRENT) USE OF HIGH-RISK MEDICATION: ICD-10-CM

## 2023-03-09 DIAGNOSIS — M51.36 DDD (DEGENERATIVE DISC DISEASE), LUMBAR: ICD-10-CM

## 2023-03-09 DIAGNOSIS — M25.561 CHRONIC PAIN OF BOTH KNEES: ICD-10-CM

## 2023-03-09 DIAGNOSIS — G89.29 CHRONIC PAIN OF BOTH KNEES: ICD-10-CM

## 2023-03-09 DIAGNOSIS — G89.29 OTHER CHRONIC PAIN: Primary | ICD-10-CM

## 2023-03-09 DIAGNOSIS — M47.816 LUMBAR FACET ARTHROPATHY: ICD-10-CM

## 2023-03-09 DIAGNOSIS — M25.562 CHRONIC PAIN OF BOTH KNEES: ICD-10-CM

## 2023-03-09 DIAGNOSIS — M50.30 DDD (DEGENERATIVE DISC DISEASE), CERVICAL: ICD-10-CM

## 2023-03-09 PROCEDURE — 1159F MED LIST DOCD IN RCRD: CPT | Performed by: NURSE PRACTITIONER

## 2023-03-09 PROCEDURE — 1160F RVW MEDS BY RX/DR IN RCRD: CPT | Performed by: NURSE PRACTITIONER

## 2023-03-09 PROCEDURE — 1125F AMNT PAIN NOTED PAIN PRSNT: CPT | Performed by: NURSE PRACTITIONER

## 2023-03-09 PROCEDURE — 99214 OFFICE O/P EST MOD 30 MIN: CPT | Performed by: NURSE PRACTITIONER

## 2023-03-09 RX ORDER — CYCLOBENZAPRINE HCL 5 MG
5 TABLET ORAL 2 TIMES DAILY PRN
Qty: 60 TABLET | Refills: 1 | Status: SHIPPED | OUTPATIENT
Start: 2023-03-09

## 2023-03-09 RX ORDER — TRAMADOL HYDROCHLORIDE 50 MG/1
50 TABLET ORAL DAILY PRN
Qty: 30 TABLET | Refills: 0 | Status: SHIPPED | OUTPATIENT
Start: 2023-03-09

## 2023-03-09 NOTE — PROGRESS NOTES
CHIEF COMPLAINT  F/U back and knee pain- patient states that her pain has remained the same since her last visit.     Subjective   Damaris Williamson is a 62 y.o. female  who presents for follow-up.  She has a history of back pain, knee pain.      We previously discussed bilateral SI joint injections.  She reports today that she would be interested in this, but has some other medical things which she would like to take care of first.    Neck pain worsening.  Interested in interventional options. Has not had MRI in several years. She complains of neck pain with radaiation into the right shoulder.  She has had PT for this problem which did not help, this was a couple of years ago. She does continue with a HEP almost daily, these exercises were recommended by the physical therapist.      Patient is seen for chronic pain in her back as well as knee.  Pain today 6/10 VAS.  Patient has not been seen in over 6 months (due to personal issues), has been relying on ice and [place] back/body.  Was previously Maintained on tramadol 50 mg 1 to 2/day as well as Flexeril 5 mg 1 daily as needed.  Avoiding oral NSAIDs due to LFT elevation. Medication regimen was quite helpful. No adverse reactions. Some constipation, managed with occasional stool softener.       Needs both heels reconstructed due to spurring.      Left knee replacement denied by insurance. Right TKA in 2019 (Dr. Hsieh).  Has seen rheum. No inflammatory arthritis.      EMG 5/19/2022 --- normal     Dr. Merrill 10/7/2022 --- no current neurosurgical recommendations.  Could consider SCS for lumbar.  Consider cervical BRYCE for neck.      Procedures ---   Bilateral L2-L4 RFA 8/19/2021 --- no relief  Left L4 transforaminal epidural 10/2020 --- 50% relief of radicular symptoms  Bilateral L2-L4 RFA 6/8/2020 --- 80-90% relief for 10 months  Bilateral L2-L5 RFA in 2019 provided significant relief    Back Pain  This is a chronic problem. The current episode started more than 1 month  ago. The problem occurs daily. The problem has been waxing and waning since onset. The pain is present in the lumbar spine. The quality of the pain is described as aching and burning. The pain radiates to the left thigh. The pain is at a severity of 6/10. The pain is moderate. The symptoms are aggravated by standing and sitting (prolonged sitting, lifting). Associated symptoms include abdominal pain, numbness and weakness. Pertinent negatives include no chest pain, dysuria, fever or headaches. She has tried analgesics, NSAIDs, heat, ice, muscle relaxant and home exercises for the symptoms. The treatment provided moderate relief.   Neck Pain   The current episode started more than 1 month ago. The problem occurs daily. The problem has been waxing and waning. The pain is present in the left side, midline and right side. The quality of the pain is described as aching. The pain is at a severity of 6/10. The pain is moderate. Associated symptoms include numbness and weakness. Pertinent negatives include no chest pain, fever or headaches. She has tried NSAIDs, neck support, muscle relaxants and home exercises for the symptoms. The treatment provided moderate relief.      PEG Assessment   What number best describes your pain on average in the past week?8  What number best describes how, during the past week, pain has interfered with your enjoyment of life?8  What number best describes how, during the past week, pain has interfered with your general activity?  8      The following portions of the patient's history were reviewed and updated as appropriate: allergies, current medications, past family history, past medical history, past social history, past surgical history and problem list.    Review of Systems   Constitutional: Positive for activity change (decreased) and fatigue. Negative for chills and fever.   HENT: Negative for congestion.    Eyes: Negative for visual disturbance.   Respiratory: Negative for chest  "tightness and shortness of breath.    Cardiovascular: Negative for chest pain.   Gastrointestinal: Positive for abdominal pain and constipation. Negative for diarrhea.   Genitourinary: Negative for difficulty urinating, dyspareunia and dysuria.   Musculoskeletal: Positive for back pain and neck pain.   Neurological: Positive for dizziness, weakness, light-headedness and numbness. Negative for headaches.   Psychiatric/Behavioral: Positive for sleep disturbance. Negative for agitation. The patient is nervous/anxious.      I have reviewed and confirmed the accuracy of the ROS as documented by the MA/LPN/RN CARLITA De La Cruz    Vitals:    03/09/23 0946   BP: 161/88   Pulse: 94   Temp: 98.7 °F (37.1 °C)   SpO2: 97%   Weight: 129 kg (285 lb)   Height: 167.6 cm (66\")   PainSc:   6   PainLoc: Back     Objective   Physical Exam  Vitals and nursing note reviewed.   Constitutional:       General: She is not in acute distress.     Appearance: Normal appearance. She is not ill-appearing.   Pulmonary:      Effort: Pulmonary effort is normal. No respiratory distress.   Musculoskeletal:      Cervical back: Spasms, tenderness and bony tenderness present.      Lumbar back: Spasms, tenderness and bony tenderness present.   Neurological:      Mental Status: She is alert and oriented to person, place, and time.      Motor: Motor function is intact. No weakness.      Gait: Gait normal.   Psychiatric:         Mood and Affect: Mood normal.         Behavior: Behavior normal.       Assessment & Plan   Diagnoses and all orders for this visit:    1. Other chronic pain (Primary)    2. DDD (degenerative disc disease), lumbar  -     traMADol (ULTRAM) 50 MG tablet; Take 1 tablet by mouth Daily As Needed for Moderate Pain.  Dispense: 30 tablet; Refill: 0    3. Lumbar facet arthropathy  -     traMADol (ULTRAM) 50 MG tablet; Take 1 tablet by mouth Daily As Needed for Moderate Pain.  Dispense: 30 tablet; Refill: 0    4. Chronic pain of both " knees  -     traMADol (ULTRAM) 50 MG tablet; Take 1 tablet by mouth Daily As Needed for Moderate Pain.  Dispense: 30 tablet; Refill: 0    5. Encounter for long-term (current) use of high-risk medication    6. DDD (degenerative disc disease), cervical  -     MRI Cervical Spine Without Contrast; Future    Other orders  -     cyclobenzaprine (FLEXERIL) 5 MG tablet; Take 1 tablet by mouth 2 (Two) Times a Day As Needed for Muscle Spasms.  Dispense: 60 tablet; Refill: 1      --- Refill Tramadol. Patient appears stable with current regimen. No adverse effects. Regarding continuation of opioids, there is no evidence of aberrant behavior or any red flags.  The patient continues with appropriate response to opioid therapy. ADL's remain intact by self.   --- Refill Flexeril  --- The urine drug screen confirmation from 8/16/2022 has been reviewed and the result is appropriate based on patient history and ANT report  --- The patient signed an updated copy of the controlled substance agreement on 12/20/2021 (update next visit)  --- Considering bilateral SI joint injections  --- Consider interventional pain options for cervical spine. Pending updated MRI.    --- Follow-up 2-3 months/sooner if needed          ANT REPORT  As part of the patient's treatment plan, I am prescribing controlled substances. The patient has been made aware of appropriate use of such medications, including potential risk of somnolence, limited ability to drive and/or work safely, and the potential for dependence or overdose. It has also been made clear that these medications are for use by this patient only, without concomitant use of alcohol or other substances unless prescribed.     Patient has completed prescribing agreement detailing terms of continued prescribing of controlled substances, including monitoring ANT reports, urine drug screening, and pill counts if necessary. The patient is aware that inappropriate use will results in cessation  of prescribing such medications.    As the clinician, I personally reviewed the ANT from 3/9/2023 while the patient was in the office today.    History and physical exam exhibit continued safe and appropriate use of controlled substances.     Dictated utilizing Dragon dictation.     Patient remained masked during entire encounter. No cough present. I donned a mask and eye protection throughout entire visit. Prior to donning mask and eye protection, hand hygiene was performed, as well as when it was doffed.  I was closer than 6 feet, but not for an extended period of time. No obvious exposure to any bodily fluids.

## 2023-04-03 ENCOUNTER — HOSPITAL ENCOUNTER (OUTPATIENT)
Dept: MRI IMAGING | Facility: HOSPITAL | Age: 63
Discharge: HOME | End: 2023-04-03
Payer: MEDICARE

## 2023-04-03 ENCOUNTER — APPOINTMENT (OUTPATIENT)
Dept: OTHER | Facility: HOSPITAL | Age: 63
End: 2023-04-03
Payer: MEDICARE

## 2023-04-03 DIAGNOSIS — M50.30 DDD (DEGENERATIVE DISC DISEASE), CERVICAL: ICD-10-CM

## 2023-04-03 DIAGNOSIS — Z09 FOLLOW-UP EXAM: ICD-10-CM

## 2023-04-03 PROCEDURE — 72141 MRI NECK SPINE W/O DYE: CPT

## 2023-04-06 NOTE — PROGRESS NOTES
Overall unchanged from her MRI in 2020.  Degenerative disease and arthritis.  She can come in sooner than scheduled if she wishes to discuss interventional options sooner.  Otherwise we will see her as planned.

## 2023-06-13 ENCOUNTER — OFFICE VISIT (OUTPATIENT)
Dept: PAIN MEDICINE | Facility: CLINIC | Age: 63
End: 2023-06-13
Payer: MEDICARE

## 2023-06-13 VITALS
HEART RATE: 84 BPM | SYSTOLIC BLOOD PRESSURE: 150 MMHG | DIASTOLIC BLOOD PRESSURE: 70 MMHG | TEMPERATURE: 98 F | HEIGHT: 66 IN | RESPIRATION RATE: 20 BRPM | BODY MASS INDEX: 46.67 KG/M2 | OXYGEN SATURATION: 96 % | WEIGHT: 290.4 LBS

## 2023-06-13 DIAGNOSIS — Z79.899 ENCOUNTER FOR LONG-TERM (CURRENT) USE OF HIGH-RISK MEDICATION: ICD-10-CM

## 2023-06-13 DIAGNOSIS — G89.29 CHRONIC PAIN OF BOTH KNEES: ICD-10-CM

## 2023-06-13 DIAGNOSIS — M25.561 CHRONIC PAIN OF BOTH KNEES: ICD-10-CM

## 2023-06-13 DIAGNOSIS — M47.816 LUMBAR FACET ARTHROPATHY: ICD-10-CM

## 2023-06-13 DIAGNOSIS — M51.36 DDD (DEGENERATIVE DISC DISEASE), LUMBAR: ICD-10-CM

## 2023-06-13 DIAGNOSIS — G89.29 OTHER CHRONIC PAIN: Primary | ICD-10-CM

## 2023-06-13 DIAGNOSIS — M25.562 CHRONIC PAIN OF BOTH KNEES: ICD-10-CM

## 2023-06-13 DIAGNOSIS — M50.30 DDD (DEGENERATIVE DISC DISEASE), CERVICAL: ICD-10-CM

## 2023-06-13 RX ORDER — TRAMADOL HYDROCHLORIDE 50 MG/1
50 TABLET ORAL DAILY PRN
Qty: 30 TABLET | Refills: 0 | Status: SHIPPED | OUTPATIENT
Start: 2023-06-13

## 2023-06-13 RX ORDER — CYCLOBENZAPRINE HCL 5 MG
5 TABLET ORAL 2 TIMES DAILY PRN
Qty: 60 TABLET | Refills: 1 | Status: SHIPPED | OUTPATIENT
Start: 2023-06-13

## 2023-06-13 NOTE — PROGRESS NOTES
CHIEF COMPLAINT  Back pain     Subjective   Damaris Williamson is a 62 y.o. female  who presents for follow-up.  She has a history of back pain, neck pain, knee pain.  Pain today 7/10 VAS.  Taking Tramadol sparingly, FLexeril PRN.  Avoidsoral NSAIDs due to LFT elevation. Medication regimen is helpful. No adverse reactions. Some constipation, managed with occasional stool softener.      We previously discussed bilateral SI joint injections.  Also previously discussed potential for cervical interventions.  Hesitant to consider steroid injections due to elevated BP and glucose after knee injections.  Says she did not experience this after her last lumbar TFESI.       Needs both heels reconstructed due to spurring.       Left knee replacement denied by insurance. Right TKA in 2019 (Dr. Hsieh).  Has seen rheum. No inflammatory arthritis.      EMG 5/19/2022 --- normal     Dr. Merrill 10/7/2022 --- no current neurosurgical recommendations.  Could consider SCS for lumbar.  Consider cervical BRYCE for neck.      Procedures ---   Bilateral L2-L4 RFA 8/19/2021 --- no relief  Left L4 transforaminal epidural 10/2020 --- 50% relief of radicular symptoms  Bilateral L2-L4 RFA 6/8/2020 --- 80-90% relief for 10 months  Bilateral L2-L5 RFA in 2019 provided significant relief    Back Pain  This is a chronic problem. The current episode started more than 1 month ago. The problem occurs daily. The problem has been waxing and waning since onset. The pain is present in the lumbar spine. The quality of the pain is described as aching and burning. The pain radiates to the left thigh. The pain is at a severity of 7/10. The pain is moderate. The symptoms are aggravated by standing and sitting (prolonged sitting, lifting). Associated symptoms include dysuria, headaches and numbness (hands,feet). Pertinent negatives include no abdominal pain, chest pain, fever or weakness. She has tried analgesics, NSAIDs, heat, ice, muscle relaxant and home  exercises for the symptoms. The treatment provided moderate relief.   Neck Pain   The current episode started more than 1 month ago. The problem occurs daily. The problem has been waxing and waning. The pain is present in the left side, midline and right side. The quality of the pain is described as aching. The pain is at a severity of 7/10. The pain is moderate. Associated symptoms include headaches and numbness (hands,feet). Pertinent negatives include no chest pain, fever or weakness. She has tried NSAIDs, neck support, muscle relaxants and home exercises for the symptoms. The treatment provided moderate relief.      PEG Assessment   What number best describes your pain on average in the past week?8  What number best describes how, during the past week, pain has interfered with your enjoyment of life?9  What number best describes how, during the past week, pain has interfered with your general activity?  9    Review of Pertinent Medical Data ---      The following portions of the patient's history were reviewed and updated as appropriate: allergies, current medications, past family history, past medical history, past social history, past surgical history, and problem list.    Review of Systems   Constitutional:  Positive for activity change. Negative for fatigue and fever.   Eyes:  Negative for visual disturbance.   Respiratory:  Positive for cough. Negative for chest tightness.    Cardiovascular:  Negative for chest pain.   Gastrointestinal:  Positive for constipation. Negative for abdominal pain and diarrhea.   Genitourinary:  Positive for dysuria. Negative for difficulty urinating.   Musculoskeletal:  Positive for back pain and neck pain.   Neurological:  Positive for numbness (hands,feet) and headaches. Negative for dizziness, weakness and light-headedness.   Psychiatric/Behavioral:  Positive for agitation and sleep disturbance. Negative for suicidal ideas. The patient is nervous/anxious.    I have reviewed  "and confirmed the accuracy of the ROS as documented by the MA/LPN/RN Sandra JoseCARLITA  Vitals:    06/13/23 1054   BP: 150/70   BP Location: Left arm   Patient Position: Sitting   Cuff Size: Large Adult   Pulse: 84   Resp: 20   Temp: 98 °F (36.7 °C)   TempSrc: Temporal   SpO2: 96%   Weight: 132 kg (290 lb 6.4 oz)   Height: 167.6 cm (66\")   PainSc:   7     Objective   Physical Exam  Vitals and nursing note reviewed.   Constitutional:       General: She is not in acute distress.     Appearance: Normal appearance. She is obese. She is not ill-appearing.   Cardiovascular:      Rate and Rhythm: Normal rate.   Pulmonary:      Effort: Pulmonary effort is normal. No respiratory distress.   Musculoskeletal:      Lumbar back: Tenderness and bony tenderness present.   Skin:     General: Skin is warm and dry.   Neurological:      Mental Status: She is alert and oriented to person, place, and time.      Motor: Motor function is intact.      Gait: Gait abnormal.   Psychiatric:         Mood and Affect: Mood normal.         Behavior: Behavior normal.     Assessment & Plan   Diagnoses and all orders for this visit:    1. Other chronic pain (Primary)  -     Urine Drug Screen Confirmation - Urine, Clean Catch; Future  -     POC Urine Drug Screen, Triage    2. DDD (degenerative disc disease), lumbar  -     traMADol (ULTRAM) 50 MG tablet; Take 1 tablet by mouth Daily As Needed for Moderate Pain.  Dispense: 30 tablet; Refill: 0  -     Urine Drug Screen Confirmation - Urine, Clean Catch; Future  -     POC Urine Drug Screen, Triage    3. DDD (degenerative disc disease), cervical  -     Urine Drug Screen Confirmation - Urine, Clean Catch; Future  -     POC Urine Drug Screen, Triage    4. Encounter for long-term (current) use of high-risk medication  -     Urine Drug Screen Confirmation - Urine, Clean Catch; Future  -     POC Urine Drug Screen, Triage    5. Lumbar facet arthropathy  -     traMADol (ULTRAM) 50 MG tablet; Take 1 tablet " by mouth Daily As Needed for Moderate Pain.  Dispense: 30 tablet; Refill: 0  -     Urine Drug Screen Confirmation - Urine, Clean Catch; Future  -     POC Urine Drug Screen, Triage    6. Chronic pain of both knees  -     traMADol (ULTRAM) 50 MG tablet; Take 1 tablet by mouth Daily As Needed for Moderate Pain.  Dispense: 30 tablet; Refill: 0  -     Urine Drug Screen Confirmation - Urine, Clean Catch; Future  -     POC Urine Drug Screen, Triage    Other orders  -     cyclobenzaprine (FLEXERIL) 5 MG tablet; Take 1 tablet by mouth 2 (Two) Times a Day As Needed for Muscle Spasms.  Dispense: 60 tablet; Refill: 1      --- Bilateral SI joint injection if needed. She will call if she wishes to proceed.    --- JERAMIE if needed (neck not primary complaint today)  --- Refill Tramadol. Patient appears stable with current regimen. No adverse effects. Regarding continuation of opioids, there is no evidence of aberrant behavior or any red flags.  The patient continues with appropriate response to opioid therapy. ADL's remain intact by self.   --- Routine UDS in office today as part of monitoring requirements for controlled substances.  The specimen was viewed and the immunoassay result reviewed and is NEG.  This specimen will be sent to [email or web address] laboratory for confirmation.     --- The patient signed an updated copy of the controlled substance agreement on 6/13/2023  --- Follow-up 3 months/sooner if needed        ANT REPORT  As part of the patient's treatment plan, I am prescribing controlled substances. The patient has been made aware of appropriate use of such medications, including potential risk of somnolence, limited ability to drive and/or work safely, and the potential for dependence or overdose. It has also been made clear that these medications are for use by this patient only, without concomitant use of alcohol or other substances unless prescribed.     Patient has completed prescribing agreement detailing terms of  continued prescribing of controlled substances, including monitoring ANT reports, urine drug screening, and pill counts if necessary. The patient is aware that inappropriate use will results in cessation of prescribing such medications.    As the clinician, I personally reviewed the ANT from 6/13/2023 while the patient was in the office today.    History and physical exam exhibit continued safe and appropriate use of controlled substances.     Dictated utilizing Dragon dictation.

## 2023-07-25 DIAGNOSIS — M25.561 CHRONIC PAIN OF BOTH KNEES: ICD-10-CM

## 2023-07-25 DIAGNOSIS — G89.29 CHRONIC PAIN OF BOTH KNEES: ICD-10-CM

## 2023-07-25 DIAGNOSIS — M47.816 LUMBAR FACET ARTHROPATHY: ICD-10-CM

## 2023-07-25 DIAGNOSIS — M25.562 CHRONIC PAIN OF BOTH KNEES: ICD-10-CM

## 2023-07-25 DIAGNOSIS — M51.36 DDD (DEGENERATIVE DISC DISEASE), LUMBAR: ICD-10-CM

## 2023-07-25 RX ORDER — TRAMADOL HYDROCHLORIDE 50 MG/1
50 TABLET ORAL DAILY PRN
Qty: 30 TABLET | Refills: 0 | Status: SHIPPED | OUTPATIENT
Start: 2023-07-25

## 2023-09-08 DIAGNOSIS — M25.561 CHRONIC PAIN OF BOTH KNEES: ICD-10-CM

## 2023-09-08 DIAGNOSIS — G89.29 CHRONIC PAIN OF BOTH KNEES: ICD-10-CM

## 2023-09-08 DIAGNOSIS — M47.816 LUMBAR FACET ARTHROPATHY: ICD-10-CM

## 2023-09-08 DIAGNOSIS — M51.36 DDD (DEGENERATIVE DISC DISEASE), LUMBAR: ICD-10-CM

## 2023-09-08 DIAGNOSIS — M25.562 CHRONIC PAIN OF BOTH KNEES: ICD-10-CM

## 2023-09-08 RX ORDER — TRAMADOL HYDROCHLORIDE 50 MG/1
50 TABLET ORAL DAILY PRN
Qty: 30 TABLET | Refills: 0 | Status: SHIPPED | OUTPATIENT
Start: 2023-09-08

## 2023-10-16 DIAGNOSIS — M25.561 CHRONIC PAIN OF BOTH KNEES: ICD-10-CM

## 2023-10-16 DIAGNOSIS — M51.36 DDD (DEGENERATIVE DISC DISEASE), LUMBAR: ICD-10-CM

## 2023-10-16 DIAGNOSIS — M25.562 CHRONIC PAIN OF BOTH KNEES: ICD-10-CM

## 2023-10-16 DIAGNOSIS — G89.29 CHRONIC PAIN OF BOTH KNEES: ICD-10-CM

## 2023-10-16 DIAGNOSIS — M47.816 LUMBAR FACET ARTHROPATHY: ICD-10-CM

## 2023-10-17 RX ORDER — TRAMADOL HYDROCHLORIDE 50 MG/1
50 TABLET ORAL DAILY PRN
Qty: 30 TABLET | Refills: 0 | Status: SHIPPED | OUTPATIENT
Start: 2023-10-17

## 2023-10-18 ENCOUNTER — OFFICE VISIT (OUTPATIENT)
Dept: PAIN MEDICINE | Facility: CLINIC | Age: 63
End: 2023-10-18
Payer: MEDICARE

## 2023-10-18 VITALS
OXYGEN SATURATION: 98 % | BODY MASS INDEX: 46.51 KG/M2 | HEIGHT: 66 IN | WEIGHT: 289.4 LBS | TEMPERATURE: 97.7 F | SYSTOLIC BLOOD PRESSURE: 120 MMHG | DIASTOLIC BLOOD PRESSURE: 86 MMHG | HEART RATE: 87 BPM | RESPIRATION RATE: 18 BRPM

## 2023-10-18 DIAGNOSIS — M25.561 CHRONIC PAIN OF BOTH KNEES: ICD-10-CM

## 2023-10-18 DIAGNOSIS — M50.30 DDD (DEGENERATIVE DISC DISEASE), CERVICAL: ICD-10-CM

## 2023-10-18 DIAGNOSIS — M25.562 CHRONIC PAIN OF BOTH KNEES: ICD-10-CM

## 2023-10-18 DIAGNOSIS — G89.29 OTHER CHRONIC PAIN: Primary | ICD-10-CM

## 2023-10-18 DIAGNOSIS — Z79.899 ENCOUNTER FOR LONG-TERM (CURRENT) USE OF HIGH-RISK MEDICATION: ICD-10-CM

## 2023-10-18 DIAGNOSIS — G89.29 CHRONIC PAIN OF BOTH KNEES: ICD-10-CM

## 2023-10-18 DIAGNOSIS — M51.36 DDD (DEGENERATIVE DISC DISEASE), LUMBAR: ICD-10-CM

## 2023-10-18 PROCEDURE — 99214 OFFICE O/P EST MOD 30 MIN: CPT | Performed by: NURSE PRACTITIONER

## 2023-10-18 PROCEDURE — 1160F RVW MEDS BY RX/DR IN RCRD: CPT | Performed by: NURSE PRACTITIONER

## 2023-10-18 PROCEDURE — 1125F AMNT PAIN NOTED PAIN PRSNT: CPT | Performed by: NURSE PRACTITIONER

## 2023-10-18 PROCEDURE — 1159F MED LIST DOCD IN RCRD: CPT | Performed by: NURSE PRACTITIONER

## 2023-10-18 RX ORDER — TRIAMCINOLONE ACETONIDE 0.25 MG/G
OINTMENT TOPICAL SEE ADMIN INSTRUCTIONS
COMMUNITY
Start: 2023-08-18

## 2023-10-18 NOTE — PROGRESS NOTES
CHIEF COMPLAINT  Follow-up for back and neck pain.    Subjective   Damaris Williamson is a 63 y.o. female  who presents for follow-up.  She has a history of back pain, neck pain.   Pain today 8/10 VAS (neck worsening).  Taking Tramadol sparingly (0-1/day), FLexeril PRN.  Avoids oral NSAIDs due to LFT elevation. Medication regimen is helpful. No adverse reactions. Some constipation, managed with occasional stool softener.      We previously discussed bilateral SI joint injections.  Also previously discussed potential for cervical interventions.  Concerned about cost and also potential for glucose elevation.        Needs both heels reconstructed due to spurring.      Left knee replacement denied by insurance. Right TKA in 2019 (Dr. Hsieh).  Has seen rheum. No inflammatory arthritis.     EMG 5/19/2022 --- normal    Dr. Merrill 10/7/2022 --- no current neurosurgical recommendations.  Could consider SCS for lumbar.  Consider cervical BRYCE for neck.     Procedures ---   Bilateral L2-L4 RFA 8/19/2021 --- no relief  Left L4 transforaminal epidural 10/2020 --- 50% relief of radicular symptoms  Bilateral L2-L4 RFA 6/8/2020 --- 80-90% relief for 10 months  Bilateral L2-L5 RFA in 2019 provided significant relief    Back Pain  This is a chronic problem. The current episode started more than 1 month ago. The problem occurs daily. The problem has been waxing and waning since onset. The pain is present in the lumbar spine. The quality of the pain is described as aching and burning. The pain radiates to the left thigh. The pain is at a severity of 8/10. The pain is moderate. The symptoms are aggravated by standing and sitting (prolonged sitting, lifting). Associated symptoms include dysuria, headaches, numbness (bilateral legs) and weakness (bilateral legs). Pertinent negatives include no abdominal pain, chest pain or fever. She has tried analgesics, NSAIDs, heat, ice, muscle relaxant and home exercises for the symptoms. The treatment  provided moderate relief.   Neck Pain   The current episode started more than 1 month ago. The problem occurs daily. The problem has been waxing and waning. The pain is present in the left side, midline and right side. The quality of the pain is described as aching. The pain is at a severity of 8/10. The pain is moderate. Associated symptoms include headaches, numbness (bilateral legs) and weakness (bilateral legs). Pertinent negatives include no chest pain or fever. She has tried NSAIDs, neck support, muscle relaxants and home exercises for the symptoms. The treatment provided moderate relief.     PEG Assessment   What number best describes your pain on average in the past week?7  What number best describes how, during the past week, pain has interfered with your enjoyment of life?7  What number best describes how, during the past week, pain has interfered with your general activity?  8    Review of Pertinent Medical Data ---    The following portions of the patient's history were reviewed and updated as appropriate: allergies, current medications, past family history, past medical history, past social history, past surgical history, and problem list.    Review of Systems   Constitutional:  Negative for fever.   Cardiovascular:  Negative for chest pain.   Gastrointestinal:  Positive for constipation. Negative for abdominal pain and diarrhea.   Genitourinary:  Positive for dysuria. Negative for difficulty urinating.   Musculoskeletal:  Positive for back pain, gait problem (unsteady gait) and neck pain.   Neurological:  Positive for weakness (bilateral legs), numbness (bilateral legs) and headaches.   Psychiatric/Behavioral:  Positive for sleep disturbance. Negative for suicidal ideas. The patient is nervous/anxious.      I have reviewed and confirmed the accuracy of the ROS as documented by the MA/LATISHA/RN CARLITA De La Cruz    Vitals:    10/18/23 1006   BP: 120/86   Pulse: 87   Resp: 18   Temp: 97.7 °F (36.5 °C)  "  SpO2: 98%   Weight: 131 kg (289 lb 6.4 oz)   Height: 167.6 cm (66\")   PainSc:   8   PainLoc: Neck  Comment: back pain 6/10     Objective   Physical Exam  Vitals and nursing note reviewed.   Constitutional:       General: She is not in acute distress.     Appearance: Normal appearance. She is obese. She is not ill-appearing.   Cardiovascular:      Rate and Rhythm: Normal rate.   Pulmonary:      Effort: Pulmonary effort is normal. No respiratory distress.   Musculoskeletal:      Cervical back: Spasms, tenderness and bony tenderness present. Pain with movement present.      Lumbar back: Tenderness and bony tenderness present.   Neurological:      Mental Status: She is alert and oriented to person, place, and time.      Motor: Motor function is intact.      Gait: Gait abnormal.   Psychiatric:         Mood and Affect: Mood normal.         Behavior: Behavior normal.     Assessment & Plan   Diagnoses and all orders for this visit:    1. Other chronic pain (Primary)    2. Chronic pain of both knees    3. DDD (degenerative disc disease), lumbar    4. DDD (degenerative disc disease), cervical    5. Encounter for long-term (current) use of high-risk medication      --- Discussed JERAMIE. Declines today. She will call office if she decides to move forward.      --- Refill Tramadol. Patient appears stable with current regimen. No adverse effects. Regarding continuation of opioids, there is no evidence of aberrant behavior or any red flags.  The patient continues with appropriate response to opioid therapy. ADL's remain intact by self.   --- The urine drug screen confirmation from 6/13/2023 has been reviewed and the result is appropriate based on patient history and ANT report  --- The patient signed an updated copy of the controlled substance agreement on 6/13/2023  --- low risk for abuse/misuse of opioids - low/sparing use of medication     Damaris Williamson reports a pain score of 8.  Given her pain assessment as noted, " treatment options were discussed and the following options were decided upon as a follow-up plan to address the patient's pain:  see plan above .      --- Follow-up 3 months/sooner if needed                  ANT REPORT  As part of the patient's treatment plan, I am prescribing controlled substances. The patient has been made aware of appropriate use of such medications, including potential risk of somnolence, limited ability to drive and/or work safely, and the potential for dependence or overdose. It has also been made clear that these medications are for use by this patient only, without concomitant use of alcohol or other substances unless prescribed.     Patient has completed prescribing agreement detailing terms of continued prescribing of controlled substances, including monitoring ANT reports, urine drug screening, and pill counts if necessary. The patient is aware that inappropriate use will results in cessation of prescribing such medications.    As the clinician, I personally reviewed the ANT from 10/18/2023 while the patient was in the office today.    History and physical exam exhibit continued safe and appropriate use of controlled substances.       Dictated utilizing Dragon dictation.

## 2024-02-01 ENCOUNTER — OFFICE VISIT (OUTPATIENT)
Dept: PAIN MEDICINE | Facility: CLINIC | Age: 64
End: 2024-02-01
Payer: MEDICARE

## 2024-02-01 VITALS
DIASTOLIC BLOOD PRESSURE: 84 MMHG | HEART RATE: 94 BPM | SYSTOLIC BLOOD PRESSURE: 141 MMHG | BODY MASS INDEX: 45.26 KG/M2 | TEMPERATURE: 98.1 F | HEIGHT: 66 IN | WEIGHT: 281.6 LBS | OXYGEN SATURATION: 97 %

## 2024-02-01 DIAGNOSIS — M47.816 LUMBAR FACET ARTHROPATHY: ICD-10-CM

## 2024-02-01 DIAGNOSIS — Z79.899 ENCOUNTER FOR LONG-TERM (CURRENT) USE OF HIGH-RISK MEDICATION: ICD-10-CM

## 2024-02-01 DIAGNOSIS — M25.561 CHRONIC PAIN OF BOTH KNEES: ICD-10-CM

## 2024-02-01 DIAGNOSIS — M50.30 DDD (DEGENERATIVE DISC DISEASE), CERVICAL: ICD-10-CM

## 2024-02-01 DIAGNOSIS — G89.29 OTHER CHRONIC PAIN: Primary | ICD-10-CM

## 2024-02-01 DIAGNOSIS — G89.29 CHRONIC PAIN OF BOTH KNEES: ICD-10-CM

## 2024-02-01 DIAGNOSIS — M25.562 CHRONIC PAIN OF BOTH KNEES: ICD-10-CM

## 2024-02-01 DIAGNOSIS — M51.36 DDD (DEGENERATIVE DISC DISEASE), LUMBAR: ICD-10-CM

## 2024-02-01 RX ORDER — TRAMADOL HYDROCHLORIDE 50 MG/1
50 TABLET ORAL 2 TIMES DAILY PRN
Qty: 60 TABLET | Refills: 0 | Status: SHIPPED | OUTPATIENT
Start: 2024-02-01

## 2024-02-01 RX ORDER — CYCLOBENZAPRINE HCL 5 MG
5 TABLET ORAL 2 TIMES DAILY PRN
Qty: 60 TABLET | Refills: 1 | Status: SHIPPED | OUTPATIENT
Start: 2024-02-01

## 2024-02-01 NOTE — PROGRESS NOTES
CHIEF COMPLAINT  F/U back and neck pain- patient states that her pain has worsened since her last visit.     Subjective   Damaris Williamson is a 63 y.o. female  who presents for follow-up.  She has a history of chronic back and neck pain.  Pain today 8/10 VAS (neck most bothersome).  Taking Tramadol sparingly (0-1/day), FLexeril PRN.  Avoids oral NSAIDs due to LFT elevation. Medication regimen is helpful. No adverse reactions. Some constipation, managed with occasional stool softener.      We previously discussed bilateral SI joint injections.  Also previously discussed potential for cervical interventions.  Concerned about cost and also potential for glucose elevation.        Needs both heels reconstructed due to spurring.      Left knee replacement denied by insurance. Right TKA in 2019 (Dr. Hsieh).  Has seen rheum. No inflammatory arthritis.     EMG 5/19/2022 --- normal    Dr. Merrill 10/7/2022 --- no current neurosurgical recommendations.  Could consider SCS for lumbar.  Consider cervical BRYCE for neck.     Procedures ---   Bilateral L2-L4 RFA 8/19/2021 --- no relief  Left L4 transforaminal epidural 10/2020 --- 50% relief of radicular symptoms  Bilateral L2-L4 RFA 6/8/2020 --- 80-90% relief for 10 months  Bilateral L2-L5 RFA in 2019 provided significant relief    Back Pain  This is a chronic problem. The current episode started more than 1 month ago. The problem occurs daily. The problem is unchanged. The pain is present in the lumbar spine. The quality of the pain is described as aching and burning. The pain radiates to the left thigh. The pain is at a severity of 8/10. The pain is moderate. The symptoms are aggravated by standing and sitting (prolonged sitting, lifting). Associated symptoms include abdominal pain, dysuria, headaches and weakness. Pertinent negatives include no chest pain, fever or numbness. She has tried analgesics, NSAIDs, heat, ice, muscle relaxant and home exercises for the symptoms. The  treatment provided moderate relief.   Neck Pain   The current episode started more than 1 month ago. The problem occurs daily. The problem has been unchanged. The pain is present in the left side, midline and right side. The quality of the pain is described as aching. The pain is at a severity of 8/10. The pain is moderate. Associated symptoms include headaches and weakness. Pertinent negatives include no chest pain, fever or numbness. She has tried NSAIDs, neck support, muscle relaxants and home exercises for the symptoms. The treatment provided moderate relief.     PEG Assessment   What number best describes your pain on average in the past week?7  What number best describes how, during the past week, pain has interfered with your enjoyment of life?8  What number best describes how, during the past week, pain has interfered with your general activity?  8    Review of Pertinent Medical Data ---    The following portions of the patient's history were reviewed and updated as appropriate: allergies, current medications, past family history, past medical history, past social history, past surgical history, and problem list.    Review of Systems   Constitutional:  Positive for activity change (decreased) and fatigue. Negative for chills and fever.   HENT:  Negative for congestion.    Eyes:  Negative for visual disturbance.   Respiratory:  Negative for chest tightness and shortness of breath.    Cardiovascular:  Negative for chest pain.   Gastrointestinal:  Positive for abdominal pain and constipation. Negative for diarrhea.   Genitourinary:  Positive for difficulty urinating and dysuria. Negative for dyspareunia.   Musculoskeletal:  Positive for back pain and neck pain.   Neurological:  Positive for dizziness, weakness, light-headedness and headaches. Negative for numbness.   Psychiatric/Behavioral:  Positive for sleep disturbance. Negative for agitation. The patient is nervous/anxious.      I have reviewed and  "confirmed the accuracy of the ROS as documented by the MA/LPN/RN Sandra JoseCARLITA    Vitals:    02/01/24 1359 02/01/24 1433   BP: (!) 183/77 141/84   BP Location:  Left arm   Patient Position:  Sitting   Cuff Size:  Large Adult   Pulse: 94    Temp: 98.1 °F (36.7 °C)    SpO2: 97%    Weight: 128 kg (281 lb 9.6 oz)    Height: 167.6 cm (66\")    PainSc:   8    PainLoc: Back      Objective   Physical Exam  Vitals and nursing note reviewed.   Constitutional:       General: She is not in acute distress.     Appearance: Normal appearance. She is obese. She is not ill-appearing.   Pulmonary:      Effort: Pulmonary effort is normal. No respiratory distress.   Musculoskeletal:      Cervical back: Spasms, tenderness and bony tenderness present. Pain with movement present.   Neurological:      Mental Status: She is alert and oriented to person, place, and time.      Motor: Motor function is intact.      Gait: Gait abnormal (slowed).   Psychiatric:         Mood and Affect: Mood normal.         Behavior: Behavior normal.       Assessment & Plan   Diagnoses and all orders for this visit:    1. Other chronic pain (Primary)  -     Urine Drug Screen Confirmation - Urine, Clean Catch; Future  -     POC Urine Drug Screen, Triage    2. DDD (degenerative disc disease), lumbar  -     traMADol (ULTRAM) 50 MG tablet; Take 1 tablet by mouth 2 (Two) Times a Day As Needed for Moderate Pain.  Dispense: 60 tablet; Refill: 0  -     Urine Drug Screen Confirmation - Urine, Clean Catch; Future  -     POC Urine Drug Screen, Triage    3. DDD (degenerative disc disease), cervical  -     Urine Drug Screen Confirmation - Urine, Clean Catch; Future  -     POC Urine Drug Screen, Triage    4. Encounter for long-term (current) use of high-risk medication  -     Urine Drug Screen Confirmation - Urine, Clean Catch; Future  -     POC Urine Drug Screen, Triage    5. Lumbar facet arthropathy  -     traMADol (ULTRAM) 50 MG tablet; Take 1 tablet by mouth 2 " (Two) Times a Day As Needed for Moderate Pain.  Dispense: 60 tablet; Refill: 0  -     Urine Drug Screen Confirmation - Urine, Clean Catch; Future  -     POC Urine Drug Screen, Triage    6. Chronic pain of both knees  -     traMADol (ULTRAM) 50 MG tablet; Take 1 tablet by mouth 2 (Two) Times a Day As Needed for Moderate Pain.  Dispense: 60 tablet; Refill: 0  -     Urine Drug Screen Confirmation - Urine, Clean Catch; Future  -     POC Urine Drug Screen, Triage    Other orders  -     cyclobenzaprine (FLEXERIL) 5 MG tablet; Take 1 tablet by mouth 2 (Two) Times a Day As Needed for Muscle Spasms.  Dispense: 60 tablet; Refill: 1      --- JERAMIE, updating MRI bot discussed. She is going to check into the financial side of things  --- Refill Tramadol. Increase to bid.  Patient appears stable with current regimen. No adverse effects. Regarding continuation of opioids, there is no evidence of aberrant behavior or any red flags.  The patient continues with appropriate response to opioid therapy. ADL's remain intact by self.   --- Routine UDS in office today as part of monitoring requirements for controlled substances.  The specimen was viewed and the immunoassay result reviewed and is NEG.  This specimen will be sent to [facility] laboratory for confirmation.     --- The patient signed an updated copy of the controlled substance agreement on 6/13/2023    Damaris Williamson reports a pain score of 8.  Given her pain assessment as noted, treatment options were discussed and the following options were decided upon as a follow-up plan to address the patient's pain:  see plan above .    --- Follow-up 1 month        ANT VASQUEZ  As part of the patient's treatment plan, I am prescribing controlled substances. The patient has been made aware of appropriate use of such medications, including potential risk of somnolence, limited ability to drive and/or work safely, and the potential for dependence or overdose. It has also been made clear  that these medications are for use by this patient only, without concomitant use of alcohol or other substances unless prescribed.     Patient has completed prescribing agreement detailing terms of continued prescribing of controlled substances, including monitoring ANT reports, urine drug screening, and pill counts if necessary. The patient is aware that inappropriate use will results in cessation of prescribing such medications.    As the clinician, I personally reviewed the ANT from 2/1/2024 while the patient was in the office today.    History and physical exam exhibit continued safe and appropriate use of controlled substances.    Dictated utilizing Dragon dictation.

## 2024-03-07 ENCOUNTER — OFFICE VISIT (OUTPATIENT)
Dept: PAIN MEDICINE | Facility: CLINIC | Age: 64
End: 2024-03-07
Payer: MEDICARE

## 2024-03-07 ENCOUNTER — HOSPITAL ENCOUNTER (OUTPATIENT)
Dept: GENERAL RADIOLOGY | Facility: HOSPITAL | Age: 64
Discharge: HOME | End: 2024-03-07
Admitting: NURSE PRACTITIONER
Payer: MEDICARE

## 2024-03-07 VITALS
RESPIRATION RATE: 20 BRPM | SYSTOLIC BLOOD PRESSURE: 125 MMHG | TEMPERATURE: 96.6 F | HEART RATE: 93 BPM | WEIGHT: 285.8 LBS | HEIGHT: 66 IN | DIASTOLIC BLOOD PRESSURE: 67 MMHG | BODY MASS INDEX: 45.93 KG/M2 | OXYGEN SATURATION: 97 %

## 2024-03-07 DIAGNOSIS — M50.30 DDD (DEGENERATIVE DISC DISEASE), CERVICAL: ICD-10-CM

## 2024-03-07 DIAGNOSIS — G89.29 OTHER CHRONIC PAIN: Primary | ICD-10-CM

## 2024-03-07 DIAGNOSIS — M25.562 CHRONIC PAIN OF BOTH KNEES: ICD-10-CM

## 2024-03-07 DIAGNOSIS — M51.36 DDD (DEGENERATIVE DISC DISEASE), LUMBAR: ICD-10-CM

## 2024-03-07 DIAGNOSIS — Z79.899 ENCOUNTER FOR LONG-TERM (CURRENT) USE OF HIGH-RISK MEDICATION: ICD-10-CM

## 2024-03-07 DIAGNOSIS — M25.561 CHRONIC PAIN OF BOTH KNEES: ICD-10-CM

## 2024-03-07 DIAGNOSIS — M47.816 LUMBAR FACET ARTHROPATHY: ICD-10-CM

## 2024-03-07 DIAGNOSIS — G89.29 CHRONIC PAIN OF BOTH KNEES: ICD-10-CM

## 2024-03-07 PROCEDURE — 99214 OFFICE O/P EST MOD 30 MIN: CPT | Performed by: NURSE PRACTITIONER

## 2024-03-07 PROCEDURE — 1159F MED LIST DOCD IN RCRD: CPT | Performed by: NURSE PRACTITIONER

## 2024-03-07 PROCEDURE — 72050 X-RAY EXAM NECK SPINE 4/5VWS: CPT

## 2024-03-07 PROCEDURE — 1125F AMNT PAIN NOTED PAIN PRSNT: CPT | Performed by: NURSE PRACTITIONER

## 2024-03-07 PROCEDURE — 1160F RVW MEDS BY RX/DR IN RCRD: CPT | Performed by: NURSE PRACTITIONER

## 2024-03-07 RX ORDER — TRAMADOL HYDROCHLORIDE 50 MG/1
50 TABLET ORAL 2 TIMES DAILY PRN
Qty: 60 TABLET | Refills: 1 | Status: SHIPPED | OUTPATIENT
Start: 2024-03-07

## 2024-03-07 NOTE — PROGRESS NOTES
CHIEF COMPLAINT  BACK PAIN  Patient reports improved pain.    Subjective   Damaris Williamson is a 63 y.o. female  who presents for follow-up.  She has a history of back pain, neck pain.  Pain today 6/10 VAS.  Taking Tramadol(1-2/day), which was increased last visit and seems to be helping quite a bit.  Also prescribed Flexeril PRN.  Avoids oral NSAIDs due to LFT elevation. Medication regimen is helpful. Some constipation, managed with stool softener.  It also makes her sleepy at times, not intolerable, hopes to acclimate to it with more time.       We previously discussed bilateral SI joint injections.  Also previously discussed potential for cervical interventions.  Concerned about cost and also potential for glucose elevation.         Needs both heels reconstructed due to spurring.       Left knee replacement denied by insurance. Right TKA in 2019 (Dr. Hsieh).  Has seen rheum. No inflammatory arthritis.      EMG 5/19/2022 --- normal     Dr. Merrill 10/7/2022 --- no current neurosurgical recommendations.  Could consider SCS for lumbar.  Consider cervical BRYCE for neck.      Procedures ---   Bilateral L2-L4 RFA 8/19/2021 --- no relief  Left L4 transforaminal epidural 10/2020 --- 50% relief of radicular symptoms  Bilateral L2-L4 RFA 6/8/2020 --- 80-90% relief for 10 months  Bilateral L2-L5 RFA in 2019 provided significant relief      Back Pain  This is a chronic problem. The current episode started more than 1 month ago. The problem occurs daily. The problem has been waxing and waning since onset. The pain is present in the lumbar spine. The quality of the pain is described as aching and burning. The pain radiates to the left thigh. The pain is at a severity of 6/10. The pain is moderate. The symptoms are aggravated by standing and sitting (prolonged sitting, lifting). Associated symptoms include dysuria, numbness (right thigh) and weakness (legs). Pertinent negatives include no abdominal pain, chest pain, fever or  headaches. She has tried analgesics, NSAIDs, heat, ice, muscle relaxant and home exercises for the symptoms. The treatment provided moderate relief.   Neck Pain   The current episode started more than 1 month ago. The problem occurs daily. The problem has been waxing and waning. The pain is present in the left side, midline and right side. The quality of the pain is described as aching. The pain is at a severity of 6/10. The pain is moderate. Associated symptoms include numbness (right thigh) and weakness (legs). Pertinent negatives include no chest pain, fever or headaches. She has tried NSAIDs, neck support, muscle relaxants and home exercises for the symptoms. The treatment provided moderate relief.        PEG Assessment   What number best describes your pain on average in the past week?5  What number best describes how, during the past week, pain has interfered with your enjoyment of life?5  What number best describes how, during the past week, pain has interfered with your general activity?  6    Review of Pertinent Medical Data ---      The following portions of the patient's history were reviewed and updated as appropriate: allergies, current medications, past family history, past medical history, past social history, past surgical history, and problem list.    Review of Systems   Constitutional:  Negative for activity change (increased), fatigue and fever.   HENT:  Negative for congestion.    Respiratory:  Negative for cough and chest tightness.    Cardiovascular:  Negative for chest pain.   Gastrointestinal:  Positive for constipation. Negative for abdominal pain and diarrhea.   Genitourinary:  Positive for difficulty urinating and dysuria.   Musculoskeletal:  Positive for back pain and neck pain.   Neurological:  Positive for dizziness, weakness (legs) and numbness (right thigh). Negative for headaches.   Psychiatric/Behavioral:  Negative for agitation, sleep disturbance and suicidal ideas. The patient is  "nervous/anxious.      I have reviewed and confirmed the accuracy of the ROS as documented by the MA/LPN/RN Sandra GarciaCARLITA    Vitals:    03/07/24 1401   BP: 125/67   BP Location: Right arm  Comment: FOREARM   Patient Position: Sitting   Cuff Size: Large Adult   Pulse: 93   Resp: 20   Temp: 96.6 °F (35.9 °C)   TempSrc: Temporal   SpO2: 97%   Weight: 130 kg (285 lb 12.8 oz)   Height: 167.6 cm (66\")   PainSc:   6         Objective   Physical Exam  Vitals and nursing note reviewed.   Constitutional:       General: She is not in acute distress.     Appearance: Normal appearance. She is obese. She is not ill-appearing.   Pulmonary:      Effort: Pulmonary effort is normal. No respiratory distress.   Musculoskeletal:      Cervical back: Spasms, tenderness and bony tenderness present. Pain with movement present.      Lumbar back: Tenderness present.   Neurological:      Mental Status: She is alert and oriented to person, place, and time.      Motor: Motor function is intact.      Gait: Gait abnormal (slowed).   Psychiatric:         Mood and Affect: Mood normal.         Behavior: Behavior normal.           Assessment & Plan   Diagnoses and all orders for this visit:    1. Other chronic pain (Primary)    2. DDD (degenerative disc disease), lumbar  -     traMADol (ULTRAM) 50 MG tablet; Take 1 tablet by mouth 2 (Two) Times a Day As Needed for Moderate Pain.  Dispense: 60 tablet; Refill: 1    3. DDD (degenerative disc disease), cervical  -     XR Spine Cervical Complete 4 or 5 View; Future    4. Encounter for long-term (current) use of high-risk medication    5. Lumbar facet arthropathy  -     traMADol (ULTRAM) 50 MG tablet; Take 1 tablet by mouth 2 (Two) Times a Day As Needed for Moderate Pain.  Dispense: 60 tablet; Refill: 1    6. Chronic pain of both knees  -     traMADol (ULTRAM) 50 MG tablet; Take 1 tablet by mouth 2 (Two) Times a Day As Needed for Moderate Pain.  Dispense: 60 tablet; Refill: 1      --- Refill " Tramadol. Patient appears stable with current regimen. No adverse effects. Regarding continuation of opioids, there is no evidence of aberrant behavior or any red flags.  The patient continues with appropriate response to opioid therapy. ADL's remain intact by self.   --- The patient signed an updated copy of the controlled substance agreement on 6/13/2023   --- The urine drug screen confirmation from 2/1/2024 has been reviewed and the result is appropriate based on patient history and ANT report      Damaris iWlliamson reports a pain score of 6.  Given her pain assessment as noted, treatment options were discussed and the following options were decided upon as a follow-up plan to address the patient's pain: continuation of current treatment plan for pain.      --- Follow-up 2 months                  ANT REPORT  As part of the patient's treatment plan, I am prescribing controlled substances. The patient has been made aware of appropriate use of such medications, including potential risk of somnolence, limited ability to drive and/or work safely, and the potential for dependence or overdose. It has also been made clear that these medications are for use by this patient only, without concomitant use of alcohol or other substances unless prescribed.     Patient has completed prescribing agreement detailing terms of continued prescribing of controlled substances, including monitoring ANT reports, urine drug screening, and pill counts if necessary. The patient is aware that inappropriate use will results in cessation of prescribing such medications.    As the clinician, I personally reviewed the ANT from 3/7/2024 while the patient was in the office today.    History and physical exam exhibit continued safe and appropriate use of controlled substances.       Dictated utilizing Dragon dictation.

## 2024-03-08 NOTE — PROGRESS NOTES
Please let her know the appearance is stable/similar to xray from 2020. Shows degenerative changes, unchanged.

## 2024-11-11 ENCOUNTER — TELEPHONE (OUTPATIENT)
Dept: NEUROSURGERY | Facility: CLINIC | Age: 64
End: 2024-11-11

## 2024-11-11 NOTE — TELEPHONE ENCOUNTER
Provider: DR BRAY    Caller: GILBERTO COSBY    Relationship to Patient: SELF      Phone Number: [phone]    Reason for Call: PT CALLED BECAUSE SHE HAS BONE SPURS IN HER NECK, SHE STATES IF SHE TURNS HER HEAD TO THE RIGHT OR LOOKS UP, IT MAKES HER DIZZY.  SOMETIMES HER NECK IS STICKING WHEN SHE TURNS HER HEAD TO THE RIGHT.  WHEN HER NECK STICKS, SHE CAN FEEL IT PINCHING A NERVE AND DRAWING UP THAT SIDE OF HER FACE.    When was the patient last seen: 10-7-22    When did it start: SHE STATES THIS HAS BEEN GOING ON FOR A FEW MONTHS    Where is it located: NECK

## 2024-11-14 NOTE — PROGRESS NOTES
"Subjective   Patient ID: Damaris Williamson is a 64 y.o. female is here today for follow-up for bone spurs in neck/ when turning neck she is dizzy and chronic neck pain  XR spine cervical done on 03-.    History of Present Illness    Patient presents today for chronic neck pain that she feels is worsened over the past 4 months.  She was last seen in the office by Dr. Merrill on 10/7/2022 for same complaints.  She states that the pain has become more constant, it does worsen if she looks up or turns her neck to the right.  She reports feeling a click with certain movements.  The pain does radiate into right upper arm.  She denies numbness or tingling in her hands.  She states over the past year she has dropped several things and not realize that due to weakness in her hands.  Denies dexterity issues.  Has not been seen by pain management in the past year.  She does take tramadol and Flexeril as needed but mostly is using her TENS unit, heat, ice and Voltaren gel.    She presents today unaccompanied    The following portions of the patient's history were reviewed and updated as appropriate: allergies, current medications, past family history, past medical history, past social history, past surgical history, and problem list.    Review of Systems   Musculoskeletal:  Positive for neck pain and neck stiffness.   All other systems reviewed and are negative.        Objective     Vitals:    11/20/24 1315   BP: 110/70   Pulse: 74   Temp: 97.3 °F (36.3 °C)   SpO2: 98%   Weight: 108 kg (238 lb)   Height: 167 cm (65.75\")     Body mass index is 38.71 kg/m².      Physical Exam  Vitals reviewed.   Constitutional:       Appearance: Normal appearance.   Pulmonary:      Effort: Pulmonary effort is normal.   Musculoskeletal:      Cervical back: Tenderness (right paraspinal) present. No bony tenderness. Pain with movement present.      Thoracic back: Normal.      Lumbar back: Normal.   Skin:     General: Skin is warm and dry. "   Neurological:      Motor: Motor strength is normal.     Deep Tendon Reflexes:      Reflex Scores:       Tricep reflexes are 2+ on the right side and 2+ on the left side.       Bicep reflexes are 2+ on the right side and 2+ on the left side.       Brachioradialis reflexes are 2+ on the right side and 2+ on the left side.      Neurological Exam  Mental Status  Awake, alert and oriented to person, place and time.    Motor  Normal muscle bulk throughout. Normal muscle tone. Strength is 5/5 throughout all four extremities.    Sensory  Sensation is intact to light touch, pinprick, vibration and proprioception in all four extremities.    Reflexes                                            Right                      Left  Brachioradialis                    2+                         2+  Biceps                                 2+                         2+  Triceps                                2+                         2+    Gait  Casual gait is normal including stance, stride, and arm swing.          Assessment & Plan   Independent Review of Radiographic Studies:      I personally reviewed the images from the following studies.    Cervical Spine Xray:   FINDINGS: No instability or subluxation with flexion/extension. The  odontoid is preserved, satisfactory C1-2 alignment. No cervical rib.  Prevertebral soft tissues within normal limits. There is mild disc space  narrowing demonstrated at C5-6 and C6-7. There is exuberant anterior  osteophyte formation at C5-6. There is no compression deformity seen.  There is mild hypertrophy.     CONCLUSION: Degenerative change as described above. No instability or  subluxation with flexion/extension    Medical Decision Makin-year-old female who presents today with complaint of chronic neck pain that has become more constant in nature over the past 4 months.  She states she has been told that she has bone spurs in the past.  She does report a clicking sound with certain  movements of her neck.  Her last MRI of the cervical spine was in April 2023 which showed multilevel degenerative disc disease.  She has had no recent imaging, with the radiation of pain into her mid arm on the right, dropping objects and sense of weakness in her hands we will get a new cervical spine MRI.  We will see her after that is completed and reviewed back in the office.  She can call the office with any questions or concerns.     Diagnoses and all orders for this visit:    1. Chronic neck pain (Primary)  -     XR spine cervical ap and lat w flex and ext; Future  -     MRI Cervical Spine Without Contrast; Future      Return in about 4 weeks (around 12/18/2024).

## 2024-11-20 ENCOUNTER — OFFICE VISIT (OUTPATIENT)
Dept: NEUROSURGERY | Facility: CLINIC | Age: 64
End: 2024-11-20
Payer: MEDICARE

## 2024-11-20 VITALS
WEIGHT: 238 LBS | BODY MASS INDEX: 38.25 KG/M2 | TEMPERATURE: 97.3 F | OXYGEN SATURATION: 98 % | DIASTOLIC BLOOD PRESSURE: 70 MMHG | HEIGHT: 66 IN | SYSTOLIC BLOOD PRESSURE: 110 MMHG | HEART RATE: 74 BPM

## 2024-11-20 DIAGNOSIS — M54.2 CHRONIC NECK PAIN: Primary | ICD-10-CM

## 2024-11-20 DIAGNOSIS — G89.29 CHRONIC NECK PAIN: Primary | ICD-10-CM

## 2025-01-21 ENCOUNTER — HOSPITAL ENCOUNTER (OUTPATIENT)
Dept: MRI IMAGING | Facility: HOSPITAL | Age: 65
Discharge: HOME | End: 2025-01-21
Admitting: NURSE PRACTITIONER
Payer: MEDICARE

## 2025-01-21 DIAGNOSIS — M54.2 CHRONIC NECK PAIN: ICD-10-CM

## 2025-01-21 DIAGNOSIS — G89.29 CHRONIC NECK PAIN: ICD-10-CM

## 2025-01-21 PROCEDURE — 72141 MRI NECK SPINE W/O DYE: CPT

## 2025-02-12 ENCOUNTER — TELEPHONE (OUTPATIENT)
Dept: PAIN MEDICINE | Facility: CLINIC | Age: 65
End: 2025-02-12

## 2025-02-12 NOTE — PROGRESS NOTES
"Subjective   Patient ID: Damaris Williamson is a 64 y.o. female is here today for follow-up for chronic neck pain.    Imaging: MRI of the cervical spine completed on 01/21/2025 and XR of the cervical spine completed on 11/20/2024    History of Present Illness    Patient presents today to review MRI of the cervical spine results.  She was last seen in the office 11/20/2024.  She reports no significant change in her chronic neck pain.  She states that it still radiates into her right upper arm.  She reports tingling to her right upper arm most of the time.  She is still taking Flexeril, tramadol when she needs it and using her TENS unit.    The following portions of the patient's history were reviewed and updated as appropriate: allergies, current medications, past family history, past medical history, past social history, past surgical history, and problem list.      Objective     Vitals:    02/18/25 1310   BP: 122/74   Pulse: 93   Resp: 16   Temp: 97.1 °F (36.2 °C)   TempSrc: Skin   SpO2: 97%   Weight: 130 kg (287 lb 4.8 oz)   Height: 167 cm (65.75\")   PainSc: 6    PainLoc: Generalized     Body mass index is 46.72 kg/m².    Tobacco Use: Low Risk  (2/18/2025)    Patient History     Smoking Tobacco Use: Never     Smokeless Tobacco Use: Never     Passive Exposure: Not on file          Physical Exam  Vitals reviewed.   Constitutional:       Appearance: Normal appearance.   Pulmonary:      Effort: Pulmonary effort is normal.   Musculoskeletal:      Cervical back: Tenderness and bony tenderness present.      Thoracic back: Normal.      Lumbar back: Normal.   Skin:     General: Skin is warm and dry.   Neurological:      Mental Status: She is alert.      Motor: Motor strength is normal.     Deep Tendon Reflexes:      Reflex Scores:       Tricep reflexes are 2+ on the right side and 2+ on the left side.       Bicep reflexes are 2+ on the right side and 2+ on the left side.       Brachioradialis reflexes are 2+ on the right side " and 2+ on the left side.  Psychiatric:         Mood and Affect: Mood normal.         Neurological:      Mental Status: He is alert and oriented to person, place, and time.       Neurological Exam  Mental Status  Alert.    Motor  Normal muscle bulk throughout. Normal muscle tone. Strength is 5/5 throughout all four extremities.    Sensory  Sensation is intact to light touch, pinprick, vibration and proprioception in all four extremities.    Reflexes                                            Right                      Left  Brachioradialis                    2+                         2+  Biceps                                 2+                         2+  Triceps                                2+                         2+    Right pathological reflexes: Deandre's absent.  Left pathological reflexes: Deandre's absent.    Gait  Casual gait is normal including stance, stride, and arm swing.      Mental Status  Alert. Oriented to person, place, and time.            Assessment & Plan   Independent Review of Radiographic Studies:      I personally reviewed the images from the following studies.    MRI cervical spine 2025:    IMPRESSION:     Stable findings when compared to the prior study dated 4/3/2023. No  significant canal stenosis is noted throughout the cervical spine. There  is moderate right C3-4, mild right C4-5, mild right C5-6, and moderate  to severe left C6-7 foraminal narrowing as discussed above.     Medical Decision Makin-year-old female who presents today to review her cervical spine MRI results.  Discussed with patient that overall findings are stable when compared to previous imaging 4/3/2023.  There was noted to be right C3-4, C4-5, C5-6 and see 6-7 foraminal narrowing but no spinal cord abnormality or canal stenosis.  I discussed options of JERAMIE, patient states that she has had those in her lumbar spine however she has had elevated blood pressure and blood sugar after receiving them  and is not sure she wants to have another injection at this time.  Discussed physical therapy and Medrol Dosepak which she is agreeable to.  I will also discuss and show her imaging to Dr. Merrill, I will call the patient later this week with any further recommendations.      2/21/2025    Dr. Merrill reviewed patient's cervical spine MRI, patient does significant degeneration and facet disease to explain her chronic neck pain but nothing that requires surgical intervention.  He recommends that she follow back up with her pain management to discuss possible cervical ablation.  Spoke with the patient regarding this and she is agreeable to this plan.  We will see her on a as needed basis.  She understands that she can call the office with any questions or concerns.    Diagnoses and all orders for this visit:    1. Chronic neck pain (Primary)  -     Ambulatory Referral to Physical Therapy for Evaluation & Treatment    2. Degeneration of intervertebral disc at C5-C6 level  -     Ambulatory Referral to Physical Therapy for Evaluation & Treatment    Other orders  -     methylPREDNISolone (MEDROL) 4 MG dose pack; Take as directed on package instructions.  Dispense: 21 tablet; Refill: 0      Return if symptoms worsen or fail to improve.

## 2025-02-12 NOTE — TELEPHONE ENCOUNTER
The [facility] received a fax that requires your attention. The document has been indexed to the patient’s chart for your review.      Reason for sending: RECEIVED ORDERS FROM [employer] THAT NEED TO BE SIGNED BY THE PROVIDER    Documents Description: PHYSICIAN ORDER-[employer]-2/10/2025    Name of Sender: [employer]    Date Indexed: 2/12/2025

## 2025-02-18 ENCOUNTER — OFFICE VISIT (OUTPATIENT)
Dept: NEUROSURGERY | Facility: CLINIC | Age: 65
End: 2025-02-18
Payer: MEDICARE

## 2025-02-18 VITALS
DIASTOLIC BLOOD PRESSURE: 74 MMHG | WEIGHT: 287.3 LBS | RESPIRATION RATE: 16 BRPM | BODY MASS INDEX: 46.17 KG/M2 | OXYGEN SATURATION: 97 % | HEART RATE: 93 BPM | SYSTOLIC BLOOD PRESSURE: 122 MMHG | TEMPERATURE: 97.1 F | HEIGHT: 66 IN

## 2025-02-18 DIAGNOSIS — G89.29 CHRONIC NECK PAIN: Primary | ICD-10-CM

## 2025-02-18 DIAGNOSIS — M50.322 DEGENERATION OF INTERVERTEBRAL DISC AT C5-C6 LEVEL: ICD-10-CM

## 2025-02-18 DIAGNOSIS — M54.2 CHRONIC NECK PAIN: Primary | ICD-10-CM

## 2025-02-18 PROCEDURE — 1159F MED LIST DOCD IN RCRD: CPT | Performed by: NURSE PRACTITIONER

## 2025-02-18 PROCEDURE — 99213 OFFICE O/P EST LOW 20 MIN: CPT | Performed by: NURSE PRACTITIONER

## 2025-02-18 PROCEDURE — 1160F RVW MEDS BY RX/DR IN RCRD: CPT | Performed by: NURSE PRACTITIONER

## 2025-02-18 RX ORDER — METHYLPREDNISOLONE 4 MG/1
TABLET ORAL
Qty: 21 TABLET | Refills: 0 | Status: SHIPPED | OUTPATIENT
Start: 2025-02-18

## 2025-08-12 ENCOUNTER — OFFICE (OUTPATIENT)
Dept: URBAN - METROPOLITAN AREA CLINIC 76 | Facility: CLINIC | Age: 65
End: 2025-08-12
Payer: MEDICAID

## 2025-08-12 VITALS
SYSTOLIC BLOOD PRESSURE: 128 MMHG | WEIGHT: 286 LBS | HEIGHT: 65 IN | OXYGEN SATURATION: 98 % | HEART RATE: 76 BPM | DIASTOLIC BLOOD PRESSURE: 86 MMHG

## 2025-08-12 DIAGNOSIS — R93.3 ABNORMAL FINDINGS ON DIAGNOSTIC IMAGING OF OTHER PARTS OF DI: ICD-10-CM

## 2025-08-12 DIAGNOSIS — R10.11 RIGHT UPPER QUADRANT PAIN: ICD-10-CM

## 2025-08-12 PROCEDURE — 99203 OFFICE O/P NEW LOW 30 MIN: CPT | Performed by: NURSE PRACTITIONER

## (undated) DEVICE — PAD GRND E/S NT200IX RF/GEN W/CABL DISP

## (undated) DEVICE — GLV SURG TRIUMPH PF LTX 7.5 STRL

## (undated) DEVICE — TOWEL,OR,DSP,ST,BLUE,STD,4/PK,20PK/CS: Brand: MEDLINE

## (undated) DEVICE — Device

## (undated) DEVICE — EPIDURAL TRAY: Brand: MEDLINE INDUSTRIES, INC.